# Patient Record
Sex: FEMALE | Race: WHITE | Employment: OTHER | URBAN - METROPOLITAN AREA
[De-identification: names, ages, dates, MRNs, and addresses within clinical notes are randomized per-mention and may not be internally consistent; named-entity substitution may affect disease eponyms.]

---

## 2017-05-02 ENCOUNTER — HOSPITAL ENCOUNTER (OUTPATIENT)
Dept: LAB | Age: 77
Discharge: HOME | End: 2017-05-02
Payer: MEDICARE

## 2017-05-02 DIAGNOSIS — Z01.818 PRE-OP TESTING: ICD-10-CM

## 2017-05-02 LAB
ALBUMIN SERPL BCP-MCNC: 4.1 G/DL (ref 3.4–5)
ALBUMIN/GLOB SERPL: 1.4 {RATIO} (ref 0.8–1.7)
ALP SERPL-CCNC: 87 U/L (ref 45–117)
ALT SERPL-CCNC: 29 U/L (ref 13–56)
ANION GAP BLD CALC-SCNC: 8 MMOL/L (ref 3–18)
AST SERPL W P-5'-P-CCNC: 19 U/L (ref 15–37)
ATRIAL RATE: 61 BPM
BILIRUB SERPL-MCNC: 0.6 MG/DL (ref 0.2–1)
BUN SERPL-MCNC: 14 MG/DL (ref 7–18)
BUN/CREAT SERPL: 16 (ref 12–20)
CALCIUM SERPL-MCNC: 9.3 MG/DL (ref 8.5–10.1)
CALCULATED P AXIS, ECG09: 18 DEGREES
CALCULATED R AXIS, ECG10: -43 DEGREES
CALCULATED T AXIS, ECG11: 68 DEGREES
CHLORIDE SERPL-SCNC: 99 MMOL/L (ref 100–108)
CO2 SERPL-SCNC: 28 MMOL/L (ref 21–32)
CREAT SERPL-MCNC: 0.85 MG/DL (ref 0.6–1.3)
DIAGNOSIS, 93000: NORMAL
ERYTHROCYTE [DISTWIDTH] IN BLOOD BY AUTOMATED COUNT: 14.7 % (ref 11.6–14.5)
GLOBULIN SER CALC-MCNC: 3 G/DL (ref 2–4)
GLUCOSE SERPL-MCNC: 90 MG/DL (ref 74–99)
HCT VFR BLD AUTO: 43.9 % (ref 35–45)
HGB BLD-MCNC: 14.7 G/DL (ref 12–16)
MCH RBC QN AUTO: 30.2 PG (ref 24–34)
MCHC RBC AUTO-ENTMCNC: 33.5 G/DL (ref 31–37)
MCV RBC AUTO: 90.3 FL (ref 74–97)
P-R INTERVAL, ECG05: 96 MS
PLATELET # BLD AUTO: 224 K/UL (ref 135–420)
PMV BLD AUTO: 11 FL (ref 9.2–11.8)
POTASSIUM SERPL-SCNC: 4 MMOL/L (ref 3.5–5.5)
PROT SERPL-MCNC: 7.1 G/DL (ref 6.4–8.2)
Q-T INTERVAL, ECG07: 422 MS
QRS DURATION, ECG06: 76 MS
QTC CALCULATION (BEZET), ECG08: 424 MS
RBC # BLD AUTO: 4.86 M/UL (ref 4.2–5.3)
SODIUM SERPL-SCNC: 135 MMOL/L (ref 136–145)
VENTRICULAR RATE, ECG03: 61 BPM
WBC # BLD AUTO: 7.4 K/UL (ref 4.6–13.2)

## 2017-05-02 PROCEDURE — 93005 ELECTROCARDIOGRAM TRACING: CPT

## 2017-05-14 NOTE — PROGRESS NOTES
This is a Subsequent Medicare Annual Wellness Visit providing Personalized Prevention Plan Services     I have reviewed the patient's medical history in detail and updated the computerized patient record. History     Past Medical History:   Diagnosis Date    Allergic rhinitis     Colon adenoma     1/15 Dr Tigre Robledo; polyp 2006 Dr Barron Sprsammie COPD     on films    DJD (degenerative joint disease)     Dyslipidemia     FHx: heart disease     GERD (gastroesophageal reflux disease)     s/p dilation Dr. Luis Alberto Truong 2006; [facility] and esophagitis 1/15 Dr Tigre Robledo    Monoclonal gammopathy 2011 Dr. Immanuel Fam Osteoporosis Dr. Carol Nicholson, t score -0.6 spine, -2.7 hip     took forteo 2012 to 2014 Dr Herring    Recurrent BCC (basal cell carcinoma)     Dr. Garzon Lesdickson dependence syndrome     UTI (urinary tract infection)       Past Surgical History:   Procedure Laterality Date    BREAST SURGERY PROCEDURE UNLISTED  1992    negative right breast biopsy Dr. Ramos Distance      Dr. Luis Alberto Truong 2006 polyp; Dr Tigre Robledo 1/15 adenoma    HX HYSTERECTOMY  1970s    for precancerous lesions in the cervix    HX ORTHOPAEDIC      DEXA t score 0.32 spine, -2.28 hip FRAX 5.3 hip Dr Carol Nicholson (12/13); spine 0.68, hip -2.51 w FRAX 7.2 (12/15)    NEUROLOGICAL PROCEDURE UNLISTED      s/p c spine surgery [facility] 2010? Current Outpatient Prescriptions   Medication Sig Dispense Refill    ezetimibe (ZETIA) 10 mg tablet Take 1 Tab by mouth daily. 90 Tab 3    tiotropium (SPIRIVA WITH HANDIHALER) 18 mcg inhalation capsule Take 1 Cap by inhalation daily. 90 Cap 3    atorvastatin (LIPITOR) 80 mg tablet Take 1 Tab by mouth daily. 90 Tab 3    albuterol (PROVENTIL HFA, VENTOLIN HFA, PROAIR HFA) 90 mcg/actuation inhaler Take 1 Puff by inhalation every six (6) hours as needed for Wheezing. 1 Inhaler 5    fluticasone (FLONASE) 50 mcg/actuation nasal spray 2 Sprays by Both Nostrils route daily.  3 Bottle 3    raloxifene (EVISTA) 60 mg tablet Take  by mouth daily.  Cholecalciferol, Vitamin D3, (VITAMIN D3) 1,000 unit Cap Take  by mouth.  aspirin delayed-release 81 mg tablet Take  by mouth daily. Allergies   Allergen Reactions    Pcn [Penicillins] Rash     Family History   Problem Relation Age of Onset    Heart Attack Father     Kidney Disease Mother      ADPKD    Diabetes Sister     Cancer Sister      leukemia     Social History   Substance Use Topics    Smoking status: Current Some Day Smoker     Packs/day: 1.00    Smokeless tobacco: Never Used    Alcohol use No     Depression Risk Factor Screening:     PHQ over the last two weeks 12/1/2016   Little interest or pleasure in doing things Not at all   Feeling down, depressed or hopeless Not at all   Total Score PHQ 2 0     SCREENINGS  Colonoscopy last done 1/15 Dr Emerita Barcenas last done 11/15  DEXA last done 12/15 Dr Karma Magana last done >5 yrs ago     Immunization History   Administered Date(s) Administered    Influenza High Dose Vaccine PF 12/01/2016    Influenza Vaccine 09/12/2013, 10/22/2014    Influenza Vaccine Split 12/02/2011    Influenza Vaccine Whole 12/18/2008    Pneumococcal Conjugate (PCV-13) 05/19/2016    Pneumococcal Vaccine (Unspecified Type) 11/03/2006    TDAP Vaccine 05/25/2011    Zoster 01/01/2008     Alcohol Risk Factor Screening: On any occasion during the past 3 months, have you had more than 3 drinks containing alcohol? No    Do you average more than 7 drinks per week? No      Functional Ability and Level of Safety:     Hearing Loss   Moderate Dr oTnya Kennedy - no acute complaints and is followed by [facility]    Activities of Daily Living   Self-care. Requires assistance with: no ADLs    Fall Risk     Fall Risk Assessment, last 12 mths 12/1/2016   Able to walk? Yes   Fall in past 12 months?  No     Abuse Screen   Patient is not abused    Review of Systems   A comprehensive review of systems was negative except for that written in the HPI. Physical Examination     Visit Vitals    /83    Pulse 76    Temp 98.1 °F (36.7 °C) (Oral)    Ht 5' 1\" (1.549 m)    Wt 115 lb (52.2 kg)    SpO2 98%    BMI 21.73 kg/m2       Evaluation of Cognitive Function:  Mood/affect:  neutral  Appearance: age appropriate, well dressed and within normal Limits  Family member/caregiver input: na    Patient Care Team:  Redge Landau, MD as PCP - General (Internal Medicine)  Cade Weinberg RN as [address] [place] [address] (Internal Medicine)  Kevin Sepulveda MD (Rheumatology)    Advice/Referrals/Counseling   Education and counseling provided:  Are appropriate based on today's review and evaluation  End-of-Life planning (with patient's consent)  Pneumococcal Vaccine  Screening Mammography  Colorectal cancer screening tests  Cardiovascular screening blood test  Bone mass measurement (DEXA)  Diabetes screening test    Assessment/Plan       ICD-10-CM ICD-9-CM    1. Routine general medical examination at a health care facility Z00.00 V70.0    2. Screening for alcoholism Z13.89 V79.1    3. Advanced directives, counseling/discussion Z71.89 V65.49 ADVANCE CARE PLANNING FIRST 30 MINS   4. Screening for depression Z13.89 V79.0 DEPRESSION SCREEN ANNUAL   5. Screen for colon cancer Z12.11 V76.51    6. Screening for diabetes mellitus Z13.1 V77.1    7. Screening for ischemic heart disease Z13.6 V81.0    8. Encounter for screening mammogram for malignant neoplasm of breast Z12.31 V76.12    9. Postmenopausal Z78.0 V49.81    10. Chronic obstructive pulmonary disease, unspecified COPD type ([facility] J44.9 496    11. Tobacco dependence syndrome F17.200 305.1    12. Dyslipidemia E78.5 272.4    13. Preop exam for internal medicine Z01.818 V72.83      current treatment plan is effective, no change in therapy  lab results and schedule of future lab studies reviewed with patient. End of life discussion undertaken.   Will work on getting amd in place  Colonoscopy beyond age  [de-identified] to be scheduled later this year  DEXA to be scheduled later this year

## 2017-05-14 NOTE — PATIENT INSTRUCTIONS
Medicare Part B Preventive Services Limitations Recommendation Scheduled   Bone Mass Measurement  (age 72 & older, biennial) Requires diagnosis related to osteoporosis or estrogen deficiency. Biennial benefit unless patient has history of long-term glucocorticoid tx or baseline is needed because initial test was by other method     Cardiovascular Screening Blood Tests (every 5 years)  Total cholesterol, HDL, Triglycerides Order as a panel if possible     Colorectal Cancer Screening  -Fecal occult blood test (annual)  -Flexible sigmoidoscopy (5y)  -Screening colonoscopy (10y)  -Barium Enema      Counseling to Prevent Tobacco Use (up to 8 sessions per year)  - Counseling greater than 3 and up to 10 minutes  - Counseling greater than 10 minutes Patients must be asymptomatic of tobacco-related conditions to receive as preventive service     Diabetes Screening Tests (at least every 3 years, Medicare covers annually or at 6-month intervals for prediabetic patients)    Fasting blood sugar (FBS) or glucose tolerance test (GTT) Patient must be diagnosed with one of the following:  -Hypertension, Dyslipidemia, obesity, previous impaired FBS or GTT  Or any two of the following: overweight, FH of diabetes, age ? 72, history of gestational diabetes, birth of baby weighing more than 9 pounds     Diabetes Self-Management Training (DSMT) (no USPSTF recommendation) Requires referral by treating physician for patient with diabetes or renal disease. 10 hours of initial DSMT session of no less than 30 minutes each in a continuous 12-month period. 2 hours of follow-up DSMT in subsequent years.      Glaucoma Screening (no USPSTF recommendation) Diabetes mellitus, family history, , age 48 or over,  American, age 72 or over     Human Immunodeficiency Virus (HIV) Screening (annually for increased risk patients)  HIV-1 and HIV-2 by EIA, JUAN DAVID, rapid antibody test, or oral mucosa transudate Patient must be at increased risk for HIV infection per USPSTF guidelines or pregnant. Tests covered annually for patients at increased risk. Pregnant patients may receive up to 3 test during pregnancy. Medical Nutrition Therapy (MNT) (for diabetes or renal disease not recommended schedule) Requires referral by treating physician for patient with diabetes or renal disease. Can be provided in same year as diabetes self-management training (DSMT), and CMS recommends medical nutrition therapy take place after DSMT. Up to 3 hours for initial year and 2 hours in subsequent years. Prostate Cancer Screening (annually up to age 76)  - Digital rectal exam (JUAN LUIS)  - Prostate specific antigen (PSA) Annually (age 48 or over), JUAN LUIS not paid separately when covered E/M service is provided on same date     Seasonal Influenza Vaccination (annually)      Pneumococcal Vaccination (once after 72)      Hepatitis B Vaccinations (if medium/high risk) Medium/high risk factors:  End-stage renal disease,  Hemophiliacs who received Factor VIII or IX concentrates, Clients of institutions for the mentally retarded, Persons who live in the same house as a HepB virus carrier, Homosexual men, Illicit injectable drug abusers. Screening Mammography (biennial age 54-69)? Annually (age 36 or over)     Screening Pap Tests and Pelvic Examination (up to age 79 and after 79 if unknown history or abnormal study last 10 years) Every 25 months except high risk     Ultrasound Screening for Abdominal Aortic Aneurysm (AAA) (once) Patient must be referred through IPPE and not have had a screening for abdominal aortic aneurysm before under Medicare.   Limited to patients who meet one of the following criteria:  - Men who are 73-68 years old and have smoked more than 100 cigarettes in their lifetime.  -Anyone with a FH of AAA  -Anyone recommended for screening by USPSTF

## 2017-05-14 NOTE — PROGRESS NOTES
69 y.o. WHITE OR  female who presents for medical clearance    She will be undergoing hand surgery by Dr Tye Tam for CTS and thumb arthritis    Denies any cardiovascular complaints, no exercise but remains active doing chores, the lawn and watches the great grandkids. Using the spiriva daily and only using the rescue inhaler 2-3x/month. The allergies are controlled on flonase. Unfortunately, she started back the smoking, chantix worked in the past though    Denies any GI complaints on the PPI. No gu complaints    Past Medical History:   Diagnosis Date    Allergic rhinitis     Colon adenoma     1/15 Dr Dm Coello; polyp 2006 Dr Persaud Guardirisa COPD     on films    DJD (degenerative joint disease)     Dyslipidemia     FHx: heart disease     GERD (gastroesophageal reflux disease)     s/p dilation Dr. Dakotah Magdaleno 2006; [place] and esophagitis 1/15 Dr Dm Coello    Monoclonal gammopathy 2011 Dr. Peña Letters Osteoporosis Dr. Aissatou Benitez, t score -0.6 spine, -2.7 hip     took forteo 2012 to 2014 Dr Herring    Recurrent BCC (basal cell carcinoma)     Dr. Kali Maria dependence syndrome     UTI (urinary tract infection)      Past Surgical History:   Procedure Laterality Date    BREAST SURGERY PROCEDURE UNLISTED  1992    negative right breast biopsy Dr. Dominique Magdaleno 2006 polyp; Dr Dm Coello 1/15 adenoma    HX HYSTERECTOMY  1970s    for precancerous lesions in the cervix    HX ORTHOPAEDIC      DEXA t score 0.32 spine, -2.28 hip FRAX 5.3 hip Dr Aissatou Benitez (12/13); spine 0.68, hip -2.51 w FRAX 7.2 (12/15)    NEUROLOGICAL PROCEDURE UNLISTED      s/p c spine surgery [facility] 2010?      Social History     Social History    Marital status:      Spouse name: N/A    Number of children: 3    Years of education: N/A     Occupational History    retired [employer]      Social History Main Topics    Smoking status: Current Some Day Smoker     Packs/day: 1.00    Smokeless tobacco: Never Used    Alcohol use No    Drug use: No    Sexual activity: Not on file     Other Topics Concern    Not on file     Social History Narrative     Allergies   Allergen Reactions    Pcn [Penicillins] Rash       Current Outpatient Prescriptions   Medication Sig    ezetimibe (ZETIA) 10 mg tablet Take 1 Tab by mouth daily.  tiotropium (SPIRIVA WITH HANDIHALER) 18 mcg inhalation capsule Take 1 Cap by inhalation daily.  atorvastatin (LIPITOR) 80 mg tablet Take 1 Tab by mouth daily.  albuterol (PROVENTIL HFA, VENTOLIN HFA, PROAIR HFA) 90 mcg/actuation inhaler Take 1 Puff by inhalation every six (6) hours as needed for Wheezing.  fluticasone (FLONASE) 50 mcg/actuation nasal spray 2 Sprays by Both Nostrils route daily.  raloxifene (EVISTA) 60 mg tablet Take  by mouth daily.  Cholecalciferol, Vitamin D3, (VITAMIN D3) 1,000 unit Cap Take  by mouth.  aspirin delayed-release 81 mg tablet Take  by mouth daily. No current facility-administered medications for this visit.       LAST MEDICARE WELLNESS EXAM: 4/30/14, 5/1/15, 5/19/16, 5/16/17  ACP 5/19/16, 5/16/17    REVIEW OF SYSTEMS:  gyn 2006, mammo 11/15, DEXA 12/15 Dr Eri Faye, colo 1/15 Dr Bob Restrepo, sees Dr Sabas Collins and Dr Kelsea Merrill  Ophtho - no vision change or eye pain  Oral - no mouth pain, tongue or tooth problems  Ears - no hearing loss, ear pain, fullness, no swallowing problems  Cardiac - no CP, PND, orthopnea, edema, palpitations or syncope  Chest - no breast masses  Resp - no wheezing, chronic coughing, dyspnea  GI - no heartburn, nausea, vomiting, change in bowel habits, bleeding, hemorrhoids  Constitutional - no wt loss, night sweats, unexplained fevers  Neuro - no focal weakness, numbness, paresthesias, incoordination, ataxia, involuntary movements  Endo - no polyuria, polydipsia, nocturia, hot flashes    Visit Vitals    /83    Pulse 76    Temp 98.1 °F (36.7 °C) (Oral)    Ht 5' 1\" (1.549 m)    Wt 115 lb (52.2 kg)    SpO2 98%    BMI 21.73 kg/m2     A&O x3  Affect is appropriate. Mood stable  No apparent distress  Anicteric, no JVD, adenopathy or thyromegaly. No carotid bruits or radiated murmur  Lungs clear to auscultation, no wheezes or rales  Heart showed regular rate and rhythm. No murmur, rubs, gallops  Abdomen soft nontender, no hepatosplenomegaly or masses. Extremities without edema.   Pulses 1-2+ symmetrically    LABS  From 5/11 showed   gluc 90,   cr 0.70,    alt 21,          chol 149, tg 100, hdl 29, ldl-c 100, wbc 9.8, hb 14.2, plt 276  From 10/11 showed gluc 100, cr 1.04, gfr 55  From 11/11 showed                   ldl-p 1685, chol 157, tg 116, hdl 41, ldl-c 93,   spep+  From 3/12 showed   gluc 91,   cr 0.84, gfr 70,  alt 17, ldl-p 1309, chol 126, tg 86,   hdl 37, ldl-c 72  From 8/12 showed   gluc 95,   cr 0.86, gfr 68,  alt 21  From 3/13 showed                   ldl-p 1281, chol 118, tg 93,   hdl 40, ldl-c 59  From 9/13 showed   gluc 92,   cr 0.82, gfr 72,  alt 19, ldl-p 1268, chol 136, tg 71,   hdl 42, ldl-c 80,   wbc 7.7, hb 13.4, plt 310, ua neg pro  From 3/14 showed   gluc 92,   cr 0.88, gfr>60, alt<5,          chol 122, tg 104, hdl 35, ldl-c 66,   wbc 7.5, hb 12.7, plt 312, ua neg  From 10/14 showed               chol 140, tg 100, hdl 36, ldl-c 84  From 4/15 showed   gluc 94,   cr 0.96, gfr>60, alt 6,          wbc 9.3, hb 14.0, plt 313  From 11/15 showed               chol 144, tg 79,   hdl 44, ldl-c 84  From 5/16 showed   gluc 82,   cr 0.82, gfr>60, alt 26,          wbc 5.3, hb 14.6, plt 241  From 11/16 showed               chol 217, tg 165, hdl 47, ldl-c 137  From 5/17 showed   gluc 90,   cr 0.75, gfr>60, alt 29,          wbc 7.4, hb 14.7, plt 224    EKG showed nsr, short pr, lad, no acute stt changes    Patient Active Problem List   Diagnosis Code    Colon polyps Dr. Brando Bains 2006 K63.5    Osteoporosis Dr. Noemi Castro M81.0    Monoclonal gammopathy 2011 Dr. Baldev Ho D47.2    COPD on films J44.9    Dyslipidemia E78.5    Arthritis, degenerative M19.90    Gastroesophageal reflux disease without esophagitis K21.9    Advance directive discussed with patient Z70.80    Tobacco dependence syndrome F17.200     ASSESSMENT AND PLAN:  1. Dyslipidemia. Continue current regimen  2. GERD. Daily PPI and avoidance measures  3. DJD. Continue prn otc meds  4. Colon polyps. F/U Dr. Yaima Delvalle 2016, fiber  5. COPD. Continue current. Smoking cessation reiterated  6. Osteoporosis. F/U Dr. Bin Hamilton as sched  7. MGUS. F/U Dr. Alvarado Giles  8. Allergies. F/U Dr. Lera Cranker, flonase cont  9. Hand/ortho. She is felt to be average risk for the planned procedure, no contraindications noted. RTC 11/16    Above conditions discussed at length and patient vocalized understanding.   All questions answered to patient satifaction

## 2017-05-16 ENCOUNTER — OFFICE VISIT (OUTPATIENT)
Dept: INTERNAL MEDICINE CLINIC | Age: 77
End: 2017-05-16

## 2017-05-16 VITALS
DIASTOLIC BLOOD PRESSURE: 83 MMHG | WEIGHT: 115 LBS | HEART RATE: 76 BPM | OXYGEN SATURATION: 98 % | BODY MASS INDEX: 21.71 KG/M2 | SYSTOLIC BLOOD PRESSURE: 134 MMHG | TEMPERATURE: 98.1 F | HEIGHT: 61 IN

## 2017-05-16 DIAGNOSIS — Z71.89 ADVANCED DIRECTIVES, COUNSELING/DISCUSSION: ICD-10-CM

## 2017-05-16 DIAGNOSIS — Z12.31 ENCOUNTER FOR SCREENING MAMMOGRAM FOR MALIGNANT NEOPLASM OF BREAST: ICD-10-CM

## 2017-05-16 DIAGNOSIS — Z13.1 SCREENING FOR DIABETES MELLITUS: ICD-10-CM

## 2017-05-16 DIAGNOSIS — Z78.0 POSTMENOPAUSAL: ICD-10-CM

## 2017-05-16 DIAGNOSIS — Z12.11 SCREEN FOR COLON CANCER: ICD-10-CM

## 2017-05-16 DIAGNOSIS — Z13.6 SCREENING FOR ISCHEMIC HEART DISEASE: ICD-10-CM

## 2017-05-16 DIAGNOSIS — J44.9 CHRONIC OBSTRUCTIVE PULMONARY DISEASE, UNSPECIFIED COPD TYPE (HCC): ICD-10-CM

## 2017-05-16 DIAGNOSIS — Z00.00 ROUTINE GENERAL MEDICAL EXAMINATION AT A HEALTH CARE FACILITY: Primary | ICD-10-CM

## 2017-05-16 DIAGNOSIS — Z13.39 SCREENING FOR ALCOHOLISM: ICD-10-CM

## 2017-05-16 DIAGNOSIS — F17.200 TOBACCO DEPENDENCE SYNDROME: ICD-10-CM

## 2017-05-16 DIAGNOSIS — Z13.31 SCREENING FOR DEPRESSION: ICD-10-CM

## 2017-05-16 DIAGNOSIS — Z01.818 PREOP EXAM FOR INTERNAL MEDICINE: ICD-10-CM

## 2017-05-16 DIAGNOSIS — E78.5 DYSLIPIDEMIA: ICD-10-CM

## 2017-05-16 NOTE — PROGRESS NOTES
1. Have you been to the ER, urgent care clinic or hospitalized since your last visit? NONO. 2. Have you seen or consulted any other health care providers outside of the [facility] since your last visit (Include any pap smears or colon screening)? NO      Do you have an Advanced Directive? NO    Would you like information on Advanced Directives?  YES

## 2017-05-16 NOTE — MR AVS SNAPSHOT
Visit Information Date & Time Provider Department Dept. Phone Encounter #  
 5/16/2017  9:30 AM Brina Ku MD Internist of [address] [place] [address] [phone] [id number] Your Appointments 6/6/2017  8:35 AM  
LAB with C NURSE VISIT Internist of [facility] ([address]) Appt Note: labs 6mos. rd  
 [address] [facility], [address] [facility] [address]  
  
   
 [address] [facility]  
  
    
 6/13/2017  9:30 AM  
Office Visit with Brina Ku MD  
Internist of [address] [place] [address] [address] [facility]) Appt Note: ov 6mos,. rd  
 [address] [facility], [id number] [place] [address] [address]  
  
   
 [address] [facility] Upcoming Health Maintenance Date Due INFLUENZA AGE 9 TO ADULT 8/1/2017 MEDICARE YEARLY EXAM 5/17/2018 GLAUCOMA SCREENING Q2Y 11/28/2018 COLONOSCOPY 1/14/2020 DTaP/Tdap/Td series (2 - Td) 5/25/2021 Allergies as of 5/16/2017  Review Complete On: 12/1/2016 By: Brina Ku MD  
  
 Severity Noted Reaction Type Reactions Pcn [Penicillins]  10/12/2011    Rash Current Immunizations  Reviewed on 5/19/2016 Name Date Influenza High Dose Vaccine PF 12/1/2016 Influenza Vaccine 10/22/2014, 9/12/2013 Influenza Vaccine Split 12/2/2011 Influenza Vaccine Whole 12/18/2008 Pneumococcal Conjugate (PCV-13) 5/19/2016 10:46 AM  
 Pneumococcal Vaccine (Unspecified Type) 11/3/2006 TDAP Vaccine 5/25/2011 Zoster 1/1/2008 Not reviewed this visit You Were Diagnosed With   
  
 Codes Comments Routine general medical examination at a health care facility    -  Primary ICD-10-CM: Z00.00 ICD-9-CM: V70.0 Screening for alcoholism     ICD-10-CM: Z13.89 ICD-9-CM: V79.1 Advanced directives, counseling/discussion     ICD-10-CM: Z71.89 ICD-9-CM: V65.49 Screening for depression     ICD-10-CM: Z13.89 ICD-9-CM: V79.0 Screen for colon cancer     ICD-10-CM: Z12.11 ICD-9-CM: V76.51 Screening for diabetes mellitus     ICD-10-CM: Z13.1 ICD-9-CM: V77.1 Screening for ischemic heart disease     ICD-10-CM: Z13.6 ICD-9-CM: V81.0 Encounter for screening mammogram for malignant neoplasm of breast     ICD-10-CM: Z12.31 
ICD-9-CM: V76.12 Postmenopausal     ICD-10-CM: Z78.0 ICD-9-CM: V49.81 Vitals BP Pulse Temp Height(growth percentile) Weight(growth percentile) SpO2  
 144/80 76 98.1 °F (36.7 °C) (Oral) 5' 1\" (1.549 m) 115 lb (52.2 kg) 98% BMI OB Status Smoking Status 21.73 kg/m2 Hysterectomy Current Some Day Smoker Vitals History BMI and BSA Data Body Mass Index Body Surface Area 21.73 kg/m 2 1.5 m 2 Your Updated Medication List  
  
   
This list is accurate as of: 5/16/17 10:18 AM.  Always use your most recent med list.  
  
  
  
  
 albuterol 90 mcg/actuation inhaler Commonly known as:  PROVENTIL HFA, VENTOLIN HFA, PROAIR HFA Take 1 Puff by inhalation every six (6) hours as needed for Wheezing. aspirin delayed-release 81 mg tablet Take  by mouth daily. atorvastatin 80 mg tablet Commonly known as:  LIPITOR Take 1 Tab by mouth daily. EVISTA 60 mg tablet Generic drug:  raloxifene Take  by mouth daily. ezetimibe 10 mg tablet Commonly known as:  Manohar Grapes Take 1 Tab by mouth daily. fluticasone 50 mcg/actuation nasal spray Commonly known as:  Posada Philipp 2 Sprays by Both Nostrils route daily. tiotropium 18 mcg inhalation capsule Commonly known as:  [address] Take 1 Cap by inhalation daily. VITAMIN D3 1,000 unit Cap Generic drug:  cholecalciferol Take  by mouth. We Performed the Following ADVANCE CARE PLANNING FIRST 30 MINS [57816 CPT(R)] Gray 69 [[id number] \[facility]\""] Patient Instructions Medicare Part B Preventive Services Limitations Recommendation Scheduled Bone Mass Measurement 
(age 72 & older, biennial) Requires diagnosis related to osteoporosis or estrogen deficiency. Biennial benefit unless patient has history of long-term glucocorticoid tx or baseline is needed because initial test was by other method Cardiovascular Screening Blood Tests (every 5 years) Total cholesterol, HDL, Triglycerides Order as a panel if possible Colorectal Cancer Screening 
-Fecal occult blood test (annual) -Flexible sigmoidoscopy (5y) 
-Screening colonoscopy (10y) -Barium Enema Counseling to Prevent Tobacco Use (up to 8 sessions per year) - Counseling greater than 3 and up to 10 minutes - Counseling greater than 10 minutes Patients must be asymptomatic of tobacco-related conditions to receive as preventive service Diabetes Screening Tests (at least every 3 years, Medicare covers annually or at 6-month intervals for prediabetic patients) Fasting blood sugar (FBS) or glucose tolerance test (GTT) Patient must be diagnosed with one of the following: 
-Hypertension, Dyslipidemia, obesity, previous impaired FBS or GTT 
Or any two of the following: overweight, FH of diabetes, age ? 72, history of gestational diabetes, birth of baby weighing more than 9 pounds Diabetes Self-Management Training (DSMT) (no USPSTF recommendation) Requires referral by treating physician for patient with diabetes or renal disease. 10 hours of initial DSMT session of no less than 30 minutes each in a continuous 12-month period. 2 hours of follow-up DSMT in subsequent years. Glaucoma Screening (no USPSTF recommendation) Diabetes mellitus, family history, , age 48 or over,  American, age 72 or over Human Immunodeficiency Virus (HIV) Screening (annually for increased risk patients) HIV-1 and HIV-2 by EIA, JUAN DAVID, rapid antibody test, or oral mucosa transudate Patient must be at increased risk for HIV infection per USPSTF guidelines or pregnant. Tests covered annually for patients at increased risk. Pregnant patients may receive up to 3 test during pregnancy. Medical Nutrition Therapy (MNT) (for diabetes or renal disease not recommended schedule) Requires referral by treating physician for patient with diabetes or renal disease. Can be provided in same year as diabetes self-management training (DSMT), and CMS recommends medical nutrition therapy take place after DSMT. Up to 3 hours for initial year and 2 hours in subsequent years. Prostate Cancer Screening (annually up to age 76) - Digital rectal exam (JUAN LUIS) - Prostate specific antigen (PSA) Annually (age 48 or over), JUAN LUIS not paid separately when covered E/M service is provided on same date Seasonal Influenza Vaccination (annually) Pneumococcal Vaccination (once after 65) Hepatitis B Vaccinations (if medium/high risk) Medium/high risk factors:  End-stage renal disease, Hemophiliacs who received Factor VIII or IX concentrates, Clients of institutions for the mentally retarded, Persons who live in the same house as a HepB virus carrier, Homosexual men, Illicit injectable drug abusers. Screening Mammography (biennial age 54-69)? Annually (age 36 or over) Screening Pap Tests and Pelvic Examination (up to age 79 and after 79 if unknown history or abnormal study last 10 years) Every 24 months except high risk Ultrasound Screening for Abdominal Aortic Aneurysm (AAA) (once) Patient must be referred through IPPE and not have had a screening for abdominal aortic aneurysm before under Medicare. Limited to patients who meet one of the following criteria: 
- Men who are 73-68 years old and have smoked more than 100 cigarettes in their lifetime. 
-Anyone with a FH of AAA 
-Anyone recommended for screening by USPSTF Introducing [facility] & HEALTH SERVICES! Beatrice Chaves introduces BitLe[employer] patient portal. Now you can access parts of your medical record, email your doctor's office, and request medication refills online. 1. In your internet browser, go to [email or web address] 2. Click on the First Time User? Click Here link in the Sign In box. You will see the New Member Sign Up page. 3. Enter your BitLeap Access Code exactly as it appears below. You will not need to use this code after youve completed the sign-up process. If you do not sign up before the expiration date, you must request a new code. · BitLeap Access Code: [email or web address] Expires: 8/14/2017 10:18 AM 
 
4. Enter the last four digits of your Social Security Number (xxxx) and Date of Birth (mm/dd/yyyy) as indicated and click Submit. You will be taken to the next sign-up page. 5. Create a BitLeap ID. This will be your BitLeap login ID and cannot be changed, so think of one that is secure and easy to remember. 6. Create a BitLeap password. You can change your password at any time. 7. Enter your Password Reset Question and Answer. This can be used at a later time if you forget your password. 8. Enter your e-mail address. You will receive e-mail notification when new information is available in [address] 9. Click Sign Up. You can now view and download portions of your medical record. 10. Click the Download Summary menu link to download a portable copy of your medical information. If you have questions, please visit the Frequently Asked Questions section of the BitLeap website. Remember, BitLeap is NOT to be used for urgent needs. For medical emergencies, dial 911. Now available from your iPhone and Android! Please provide this summary of care documentation to your next provider. Your primary care clinician is listed as Katheryn Kaba. If you have any questions after today's visit, please call [phone].

## 2017-06-06 ENCOUNTER — HOSPITAL ENCOUNTER (OUTPATIENT)
Dept: LAB | Age: 77
Discharge: HOME | End: 2017-06-06
Payer: MEDICARE

## 2017-06-06 DIAGNOSIS — E78.5 DYSLIPIDEMIA: ICD-10-CM

## 2017-06-06 LAB
ANION GAP BLD CALC-SCNC: 7 MMOL/L (ref 3–18)
BUN SERPL-MCNC: 15 MG/DL (ref 7–18)
BUN/CREAT SERPL: 17 (ref 12–20)
CALCIUM SERPL-MCNC: 9.5 MG/DL (ref 8.5–10.1)
CHLORIDE SERPL-SCNC: 103 MMOL/L (ref 100–108)
CO2 SERPL-SCNC: 28 MMOL/L (ref 21–32)
CREAT SERPL-MCNC: 0.86 MG/DL (ref 0.6–1.3)
ERYTHROCYTE [DISTWIDTH] IN BLOOD BY AUTOMATED COUNT: 15.4 % (ref 11.6–14.5)
GLUCOSE SERPL-MCNC: 86 MG/DL (ref 74–99)
HCT VFR BLD AUTO: 47 % (ref 35–45)
HGB BLD-MCNC: 15.4 G/DL (ref 12–16)
MCH RBC QN AUTO: 30.8 PG (ref 24–34)
MCHC RBC AUTO-ENTMCNC: 32.8 G/DL (ref 31–37)
MCV RBC AUTO: 94 FL (ref 74–97)
PLATELET # BLD AUTO: 235 K/UL (ref 135–420)
PMV BLD AUTO: 11 FL (ref 9.2–11.8)
POTASSIUM SERPL-SCNC: 4 MMOL/L (ref 3.5–5.5)
RBC # BLD AUTO: 5 M/UL (ref 4.2–5.3)
SODIUM SERPL-SCNC: 138 MMOL/L (ref 136–145)
WBC # BLD AUTO: 8.4 K/UL (ref 4.6–13.2)

## 2017-06-06 PROCEDURE — 80048 BASIC METABOLIC PNL TOTAL CA: CPT | Performed by: INTERNAL MEDICINE

## 2017-06-06 PROCEDURE — 85027 COMPLETE CBC AUTOMATED: CPT | Performed by: INTERNAL MEDICINE

## 2017-06-06 PROCEDURE — 36415 COLL VENOUS BLD VENIPUNCTURE: CPT | Performed by: INTERNAL MEDICINE

## 2017-06-11 NOTE — PROGRESS NOTES
69 y.o. [place]  female who presents for f/u    She successfully underwent right CTS release and thumb surgery, she's noting improvement in the sx already    Denies any cardiovascular complaints, no exercise but remains active doing chores, the lawn and watches the great grandkids. She has taken a couple trips to [place] and [place] this past year already    Using the spiriva daily and only using the rescue inhaler 2-3x/month. The allergies are controlled on flonase. Still trying to quit smoking    Denies any GI complaints on the PPI. No gu complaints    Past Medical History:   Diagnosis Date    Allergic rhinitis     Colon adenoma     1/15 Dr Ana Horton; polyp 2006 Dr Ruiz Finely COPD     on films    DJD (degenerative joint disease)     Dyslipidemia     FHx: heart disease     GERD (gastroesophageal reflux disease)     s/p dilation Dr. Grant Every 2006; [facility] and esophagitis 1/15 Dr Ana Horton    Monoclonal gammopathy 2011 Dr. Viola Reeves Osteoporosis Dr. Rivas Hanson, t score -0.6 spine, -2.7 hip     took forteo 2012 to 2014 Dr Herring    Recurrent BCC (basal cell carcinoma)     Dr. Chey Stephens dependence syndrome     UTI (urinary tract infection)      Past Surgical History:   Procedure Laterality Date    BREAST SURGERY PROCEDURE UNLISTED  1992    negative right breast biopsy Dr. Delmis Grant Every 2006 polyp; Dr Ana Horton 1/15 adenoma    HX HYSTERECTOMY  1970s    for precancerous lesions in the cervix    HX ORTHOPAEDIC      DEXA t score 0.32 spine, -2.28 hip FRAX 5.3 hip Dr Rivas Hanson (12/13); spine 0.68, hip -2.51 w FRAX 7.2 (12/15)    HX ORTHOPAEDIC  05/2017    right CTS release and thumb surgery Dr Nnamdi Eller      s/p c spine surgery [facility] 2010?      Social History     Social History    Marital status:      Spouse name: N/A    Number of children: 3    Years of education: N/A     Occupational History    retired [employer] History Main Topics    Smoking status: Current Some Day Smoker     Packs/day: 1.00    Smokeless tobacco: Never Used    Alcohol use No    Drug use: No    Sexual activity: Not on file     Other Topics Concern    Not on file     Social History Narrative     Allergies   Allergen Reactions    Pcn [Penicillins] Rash       Current Outpatient Prescriptions   Medication Sig    ezetimibe (ZETIA) 10 mg tablet Take 1 Tab by mouth daily.  tiotropium (SPIRIVA WITH HANDIHALER) 18 mcg inhalation capsule Take 1 Cap by inhalation daily.  atorvastatin (LIPITOR) 80 mg tablet Take 1 Tab by mouth daily.  albuterol (PROVENTIL HFA, VENTOLIN HFA, PROAIR HFA) 90 mcg/actuation inhaler Take 1 Puff by inhalation every six (6) hours as needed for Wheezing.  fluticasone (FLONASE) 50 mcg/actuation nasal spray 2 Sprays by Both Nostrils route daily.  raloxifene (EVISTA) 60 mg tablet Take  by mouth daily.  Cholecalciferol, Vitamin D3, (VITAMIN D3) 1,000 unit Cap Take  by mouth.  aspirin delayed-release 81 mg tablet Take  by mouth daily. No current facility-administered medications for this visit.       LAST MEDICARE WELLNESS EXAM: 4/30/14, 5/1/15, 5/19/16, 5/16/17  ACP 5/19/16, 5/16/17    REVIEW OF SYSTEMS:  gyn 2006, mammo 11/15, DEXA 12/15 Dr Simone Villegas, colo 1/15 Dr Leanne Richards, sees Dr Niko Nguyen and Dr Homar Carvalho  Ophtho - no vision change or eye pain  Oral - no mouth pain, tongue or tooth problems  Ears - no hearing loss, ear pain, fullness, no swallowing problems  Cardiac - no CP, PND, orthopnea, edema, palpitations or syncope  Chest - no breast masses  Resp - no wheezing, chronic coughing, dyspnea  GI - no heartburn, nausea, vomiting, change in bowel habits, bleeding, hemorrhoids  Constitutional - no wt loss, night sweats, unexplained fevers  Neuro - no focal weakness, numbness, paresthesias, incoordination, ataxia, involuntary movements  Endo - no polyuria, polydipsia, nocturia, hot flashes    Visit Vitals    BP 146/80    Pulse 68    Temp 97.7 °F (36.5 °C) (Oral)    Ht 5' 1\" (1.549 m)    Wt 117 lb (53.1 kg)    SpO2 99%    BMI 22.11 kg/m2     A&O x3  Affect is appropriate. Mood stable  No apparent distress  Anicteric, no JVD, adenopathy or thyromegaly. No carotid bruits or radiated murmur  Lungs clear to auscultation, no wheezes or rales  Heart showed regular rate and rhythm. No murmur, rubs, gallops  Abdomen soft nontender, no hepatosplenomegaly or masses. Extremities without edema. Pulses 1-2+ symmetrically.   Healing wounds right palm and thumb    LABS  From 5/11 showed   gluc 90,   cr 0.70,    alt 21,          chol 149, tg 100, hdl 29, ldl-c 100, wbc 9.8, hb 14.2, plt 276  From 10/11 showed gluc 100, cr 1.04, gfr 55  From 11/11 showed                   ldl-p 1685, chol 157, tg 116, hdl 41, ldl-c 93,   spep+  From 3/12 showed   gluc 91,   cr 0.84, gfr 70,  alt 17, ldl-p 1309, chol 126, tg 86,   hdl 37, ldl-c 72  From 8/12 showed   gluc 95,   cr 0.86, gfr 68,  alt 21  From 3/13 showed                   ldl-p 1281, chol 118, tg 93,   hdl 40, ldl-c 59  From 9/13 showed   gluc 92,   cr 0.82, gfr 72,  alt 19, ldl-p 1268, chol 136, tg 71,   hdl 42, ldl-c 80,   wbc 7.7, hb 13.4, plt 310, ua neg pro  From 3/14 showed   gluc 92,   cr 0.88, gfr>60, alt<5,          chol 122, tg 104, hdl 35, ldl-c 66,   wbc 7.5, hb 12.7, plt 312, ua neg  From 10/14 showed               chol 140, tg 100, hdl 36, ldl-c 84  From 4/15 showed   gluc 94,   cr 0.96, gfr>60, alt 6,          wbc 9.3, hb 14.0, plt 313  From 11/15 showed               chol 144, tg 79,   hdl 44, ldl-c 84  From 5/16 showed   gluc 82,   cr 0.82, gfr>60, alt 26,          wbc 5.3, hb 14.6, plt 241  From 11/16 showed               chol 217, tg 165, hdl 47, ldl-c 137  From 5/17 showed   gluc 90,   cr 0.75, gfr>60, alt 29,          wbc 7.4, hb 14.7, plt 224    Results for orders placed or performed during the hospital encounter of 25/06/96   METABOLIC PANEL, BASIC   Result Value Ref Range    Sodium 138 136 - 145 mmol/L    Potassium 4.0 3.5 - 5.5 mmol/L    Chloride 103 100 - 108 mmol/L    CO2 28 21 - 32 mmol/L    Anion gap 7 3.0 - 18 mmol/L    Glucose 86 74 - 99 mg/dL    BUN 15 7.0 - 18 MG/DL    Creatinine 0.86 0.6 - 1.3 MG/DL    BUN/Creatinine ratio 17 12 - 20      GFR est AA >60 >60 ml/min/1.73m2    GFR est non-AA >60 >60 ml/min/1.73m2    Calcium 9.5 8.5 - 10.1 MG/DL   CBC W/O DIFF   Result Value Ref Range    WBC 8.4 4.6 - 13.2 K/uL    RBC 5.00 4.20 - 5.30 M/uL    HGB 15.4 12.0 - 16.0 g/dL    HCT 47.0 (H) 35.0 - 45.0 %    MCV 94.0 74.0 - 97.0 FL    MCH 30.8 24.0 - 34.0 PG    MCHC 32.8 31.0 - 37.0 g/dL    RDW 15.4 (H) 11.6 - 14.5 %    PLATELET 356 451 - 610 K/uL    MPV 11.0 9.2 - 11.8 FL     Patient Active Problem List   Diagnosis Code    Colon polyps Dr. Chris Garcia 2006 K63.5    Osteoporosis Dr. Sarai Vazquez M81.0    Monoclonal gammopathy 2011 Dr. Stefanie Newman D47.2    COPD on films J44.9    Dyslipidemia E78.5    Arthritis, degenerative M19.90    Gastroesophageal reflux disease without esophagitis K21.9    Advance directive discussed with patient Z71.89    Tobacco dependence syndrome F17.200     ASSESSMENT AND PLAN:  1. Dyslipidemia. Continue current regimen  2. GERD. Daily PPI and avoidance measures  3. DJD. Continue prn otc meds  4. Colon polyps. F/U Dr. Chris Garcia 2016, fiber  5. COPD. Continue current. Smoking cessation attempts  6. Osteoporosis. F/U Dr. Sarai Vazquez as sched  7. MGUS. F/U Dr. Ken Dupont  8. Allergies. F/U katty Sanford cont          RTC 12/17    Above conditions discussed at length and patient vocalized understanding.   All questions answered to patient satifaction

## 2017-06-13 ENCOUNTER — OFFICE VISIT (OUTPATIENT)
Dept: INTERNAL MEDICINE CLINIC | Age: 77
End: 2017-06-13

## 2017-06-13 VITALS
HEIGHT: 61 IN | WEIGHT: 117 LBS | HEART RATE: 68 BPM | DIASTOLIC BLOOD PRESSURE: 80 MMHG | OXYGEN SATURATION: 99 % | TEMPERATURE: 97.7 F | BODY MASS INDEX: 22.09 KG/M2 | SYSTOLIC BLOOD PRESSURE: 146 MMHG

## 2017-06-13 DIAGNOSIS — E78.5 DYSLIPIDEMIA: Primary | ICD-10-CM

## 2017-06-13 DIAGNOSIS — K21.9 GASTROESOPHAGEAL REFLUX DISEASE WITHOUT ESOPHAGITIS: ICD-10-CM

## 2017-06-13 DIAGNOSIS — D47.2 MONOCLONAL GAMMOPATHY: ICD-10-CM

## 2017-06-13 DIAGNOSIS — F17.200 TOBACCO DEPENDENCE SYNDROME: ICD-10-CM

## 2017-06-13 DIAGNOSIS — J44.9 CHRONIC OBSTRUCTIVE PULMONARY DISEASE, UNSPECIFIED COPD TYPE (HCC): ICD-10-CM

## 2017-06-13 NOTE — MR AVS SNAPSHOT
Visit Information Date & Time Provider Department Dept. Phone Encounter #  
 6/13/2017  9:30 AM Cori Wiggins MD Internist of [facility] [phone] [id number] Follow-up Instructions Return in about 6 months (around 12/13/2017). Follow-up and Disposition History Your Appointments 12/13/2017  8:45 AM  
LAB with [facility] NURSE VISIT Internist of [facility] ([facility]) Appt Note: lab  
 [address], [address] [employer] [place] [address] [place]  
  
   
 [address], [place]  
  
    
 12/20/2017  2:00 PM  
PHYSICAL with Cori Wiggins MD  
Internist of [facility]) Appt Note:  6 months [address], [address] [facility] [address] [place]  
  
   
 [address], [facility] Upcoming Health Maintenance Date Due INFLUENZA AGE 9 TO ADULT 8/1/2017 MEDICARE YEARLY EXAM 5/17/2018 GLAUCOMA SCREENING Q2Y 11/28/2018 COLONOSCOPY 1/14/2020 DTaP/Tdap/Td series (2 - Td) 5/25/2021 Allergies as of 6/13/2017  Review Complete On: 6/13/2017 By: Cori Wiggins MD  
  
 Severity Noted Reaction Type Reactions Pcn [Penicillins]  10/12/2011    Rash Current Immunizations  Reviewed on 5/19/2016 Name Date Influenza High Dose Vaccine PF 12/1/2016 Influenza Vaccine 10/22/2014, 9/12/2013 Influenza Vaccine Split 12/2/2011 Influenza Vaccine Whole 12/18/2008 Pneumococcal Conjugate (PCV-13) 5/19/2016 10:46 AM  
 Pneumococcal Vaccine (Unspecified Type) 11/3/2006 TDAP Vaccine 5/25/2011 Zoster 1/1/2008 Not reviewed this visit You Were Diagnosed With   
  
 Codes Comments Dyslipidemia    -  Primary ICD-10-CM: E78.5 ICD-9-CM: 272.4 Chronic obstructive pulmonary disease, unspecified COPD type ([facility]     ICD-10-CM: J44.9 ICD-9-CM: 304 Monoclonal gammopathy     ICD-10-CM: D47.2 ICD-9-CM: 273.1 Gastroesophageal reflux disease without esophagitis     ICD-10-CM: K21.9 ICD-9-CM: 530.81 Tobacco dependence syndrome     ICD-10-CM: F17.200 ICD-9-CM: 305.1 Vitals BP Pulse Temp Height(growth percentile) Weight(growth percentile) SpO2  
 146/80 68 97.7 °F (36.5 °C) (Oral) 5' 1\" (1.549 m) 117 lb (53.1 kg) 99% BMI OB Status Smoking Status 22.11 kg/m2 Hysterectomy Current Some Day Smoker Vitals History BMI and BSA Data Body Mass Index Body Surface Area  
 22.11 kg/m 2 1.51 m 2 Your Updated Medication List  
  
   
This list is accurate as of: 6/13/17 10:10 AM.  Always use your most recent med list.  
  
  
  
  
 albuterol 90 mcg/actuation inhaler Commonly known as:  PROVENTIL HFA, VENTOLIN HFA, PROAIR HFA Take 1 Puff by inhalation every six (6) hours as needed for Wheezing. aspirin delayed-release 81 mg tablet Take  by mouth daily. atorvastatin 80 mg tablet Commonly known as:  LIPITOR Take 1 Tab by mouth daily. EVISTA 60 mg tablet Generic drug:  raloxifene Take  by mouth daily. ezetimibe 10 mg tablet Commonly known as:  Jorge Penny Take 1 Tab by mouth daily. fluticasone 50 mcg/actuation nasal spray Commonly known as:  Creasie Cockayne 2 Sprays by Both Nostrils route daily. tiotropium 18 mcg inhalation capsule Commonly known as:  [address] Take 1 Cap by inhalation daily. VITAMIN D3 1,000 unit Cap Generic drug:  cholecalciferol Take  by mouth. Follow-up Instructions Return in about 6 months (around 12/13/2017). To-Do List   
 12/11/2017 Lab:  LIPID PANEL Introducing [facility] & HEALTH SERVICES! Bernard Rocha introduces [employer] patient portal. Now you can access parts of your medical record, email your doctor's office, and request medication refills online. 1. In your internet browser, go to [email or web address] 2. Click on the First Time User? Click Here link in the Sign In box. You will see the New Member Sign Up page. 3. Enter your [employer] Access Code exactly as it appears below. You will not need to use this code after youve completed the sign-up process. If you do not sign up before the expiration date, you must request a new code. · [employer] Access Code: [email or web address] Expires: 8/14/2017 10:18 AM 
 
4. Enter the last four digits of your Social Security Number (xxxx) and Date of Birth (mm/dd/yyyy) as indicated and click Submit. You will be taken to the next sign-up page. 5. Create a [employer] ID. This will be your [employer] login ID and cannot be changed, so think of one that is secure and easy to remember. 6. Create a [employer] password. You can change your password at any time. 7. Enter your Password Reset Question and Answer. This can be used at a later time if you forget your password. 8. Enter your e-mail address. You will receive e-mail notification when new information is available in [address] 9. Click Sign Up. You can now view and download portions of your medical record. 10. Click the Download Summary menu link to download a portable copy of your medical information. If you have questions, please visit the Frequently Asked Questions section of the [employer] website. Remember, [employer] is NOT to be used for urgent needs. For medical emergencies, dial 911. Now available from your iPhone and Android! Please provide this summary of care documentation to your next provider. Your primary care clinician is listed as Bearl Antonio. If you have any questions after today's visit, please call [phone].

## 2017-06-13 NOTE — PROGRESS NOTES
1. Have you been to the ER, urgent care clinic or hospitalized since your last visit? NO.     2. Have you seen or consulted any other health care providers outside of the [address] [place] [address] since your last visit (Include any pap smears or colon screening)? NO      Do you have an Advanced Directive? NO    Would you like information on Advanced Directives?  YES

## 2017-07-18 DIAGNOSIS — E78.5 DYSLIPIDEMIA: ICD-10-CM

## 2017-07-18 DIAGNOSIS — J41.0 SIMPLE CHRONIC BRONCHITIS (HCC): ICD-10-CM

## 2017-07-18 RX ORDER — EZETIMIBE 10 MG/1
10 TABLET ORAL DAILY
Qty: 90 TAB | Refills: 3 | Status: SHIPPED | OUTPATIENT
Start: 2017-07-18 | End: 2018-08-14 | Stop reason: SDUPTHER

## 2017-07-18 RX ORDER — ATORVASTATIN CALCIUM 80 MG/1
80 TABLET, FILM COATED ORAL DAILY
Qty: 90 TAB | Refills: 3 | Status: SHIPPED | OUTPATIENT
Start: 2017-07-18 | End: 2018-08-14 | Stop reason: SDUPTHER

## 2017-12-13 ENCOUNTER — HOSPITAL ENCOUNTER (OUTPATIENT)
Dept: LAB | Age: 77
Discharge: HOME | End: 2017-12-13
Payer: MEDICARE

## 2017-12-13 DIAGNOSIS — E78.5 DYSLIPIDEMIA: ICD-10-CM

## 2017-12-13 LAB
CHOLEST SERPL-MCNC: 109 MG/DL
HDLC SERPL-MCNC: 49 MG/DL (ref 40–60)
HDLC SERPL: 2.2 {RATIO} (ref 0–5)
LDLC SERPL CALC-MCNC: 45.4 MG/DL (ref 0–100)
LIPID PROFILE,FLP: NORMAL
TRIGL SERPL-MCNC: 73 MG/DL (ref ?–150)
VLDLC SERPL CALC-MCNC: 14.6 MG/DL

## 2017-12-13 PROCEDURE — 80061 LIPID PANEL: CPT | Performed by: INTERNAL MEDICINE

## 2017-12-13 PROCEDURE — 36415 COLL VENOUS BLD VENIPUNCTURE: CPT | Performed by: INTERNAL MEDICINE

## 2017-12-19 NOTE — PROGRESS NOTES
69 y.o. WHITE OR  female who presents for f/u    Denies any cardiovascular complaints, no exercise but remains active with household chores    Using the spiriva daily and only using the rescue inhaler 3-4x/month. The allergies are controlled on flonase. She wants chantix sent in for smoking cessation. Has succeeded with this in the past.    Denies any GI complaints on the PPI. No gu complaints    Her daughter is concerned she's developing dementia. She herself feels that it's not an issue but not averse to doing MMSE today    LAST MEDICARE WELLNESS EXAM: 4/30/14, 5/1/15, 5/19/16, 5/16/17      ACP 5/19/16, 5/16/17    Past Medical History:   Diagnosis Date    Allergic rhinitis     Colon adenoma     1/15 Dr Ximena Hair; polyp 2006 Dr Ornelas Persons COPD     on films    DJD (degenerative joint disease)     Dyslipidemia     FHx: heart disease     GERD (gastroesophageal reflux disease)     s/p dilation Dr. Javi Britton 2006; [facility] and esophagitis 1/15 Dr Ximena Hair    Monoclonal gammopathy 2011 Dr. Kelechi Mustafa Osteoporosis Dr. Yahir Domínguez, t score -0.6 spine, -2.7 hip     took forteo 2012 to 2014 Dr Herring    Recurrent BCC (basal cell carcinoma)     Dr. Rosaura García Tobacco dependence syndrome     UTI (urinary tract infection)      Past Surgical History:   Procedure Laterality Date    BREAST SURGERY PROCEDURE UNLISTED  1992    negative right breast biopsy Dr. Tisha Britton 2006 polyp; Dr Ximena Hair 1/15 adenoma    HX HYSTERECTOMY  1970s    for precancerous lesions in the cervix    HX ORTHOPAEDIC      DEXA t score 0.32 spine, -2.28 hip FRAX 5.3 hip Dr Yahir Domínguez (12/13); spine 0.68, hip -2.51 w FRAX 7.2 (12/15)    HX ORTHOPAEDIC  05/2017    right CTS release and thumb surgery Dr Carbajal      s/p c [facility] surgery [facility] 2010?     NEUROLOGICAL PROCEDURE UNLISTED  12/2017    MMSE 30/30     Social History     Social History    Marital status:  Spouse name: N/A    Number of children: 3    Years of education: N/A     Occupational History    retired [employer]      Social History Main Topics    Smoking status: Current Some Day Smoker     Packs/day: 1.00    Smokeless tobacco: Never Used    Alcohol use No    Drug use: No    Sexual activity: Not on file     Other Topics Concern    Not on file     Social History Narrative     Allergies   Allergen Reactions    Pcn [Penicillins] Rash       Current Outpatient Prescriptions   Medication Sig    denosumab (PROLIA) 60 mg/mL injection 60 mg by SubCUTAneous route.  CALCIUM CARBONATE (CALCIUM 500 PO) Take  by mouth.  cyanocobalamin 1,000 mcg tablet Take 1,000 mcg by mouth daily.  varenicline (CHANTIX STARTER SIOMARA) 0.5 mg (11)- 1 mg (42) DsPk Take as directed    varenicline (CHANTIX) 1 mg tablet Take 1 Tab by mouth two (2) times a day.  tiotropium (SPIRIVA WITH HANDIHALER) 18 mcg inhalation capsule Take 1 Cap by inhalation daily.  atorvastatin (LIPITOR) 80 mg tablet Take 1 Tab by mouth daily.  ezetimibe (ZETIA) 10 mg tablet Take 1 Tab by mouth daily.  albuterol (PROVENTIL HFA, VENTOLIN HFA, PROAIR HFA) 90 mcg/actuation inhaler Take 1 Puff by inhalation every six (6) hours as needed for Wheezing.  fluticasone (FLONASE) 50 mcg/actuation nasal spray 2 Sprays by Both Nostrils route daily.  Cholecalciferol, Vitamin D3, (VITAMIN D3) 1,000 unit Cap Take  by mouth.  aspirin delayed-release 81 mg tablet Take  by mouth daily.  raloxifene (EVISTA) 60 mg tablet Take  by mouth daily. No current facility-administered medications for this visit.       REVIEW OF SYSTEMS:  gyn 2006, mammo 11/15, DEXA 12/15 Dr Jorge L Brock, colo 1/15 Dr Cayla Veloz, sees Dr Bea Machuca and Dr Agnes Longoria  Ophtho - no vision change or eye pain  Oral - no mouth pain, tongue or tooth problems  Ears - no hearing loss, ear pain, fullness, no swallowing problems  Cardiac - no CP, PND, orthopnea, edema, palpitations or syncope  Chest - no breast masses  Resp - no wheezing, chronic coughing, dyspnea  GI - no heartburn, nausea, vomiting, change in bowel habits, bleeding, hemorrhoids  Constitutional - no wt loss, night sweats, unexplained fevers  Neuro - no focal weakness, numbness, paresthesias, incoordination, ataxia, involuntary movements  Endo - no polyuria, polydipsia, nocturia, hot flashes    Visit Vitals    /80 (BP 1 Location: Left arm, BP Patient Position: Sitting)    Pulse (!) 105    Temp 98.1 °F (36.7 °C) (Oral)    Resp 16    Ht 5' 1\" (1.549 m)    Wt 118 lb 12.8 oz (53.9 kg)    SpO2 97%    BMI 22.45 kg/m2     A&O x3  Affect is appropriate. Mood stable  No apparent distress  Anicteric, no JVD, adenopathy or thyromegaly. No carotid bruits or radiated murmur  Lungs clear to auscultation, no wheezes or rales  Heart showed regular rate and rhythm. No murmur, rubs, gallops  Abdomen soft nontender, no hepatosplenomegaly or masses. Extremities without edema. Pulses 1-2+ symmetrically.       LABS  From 5/11 showed   gluc 90,   cr 0.70,    alt 21,          chol 149, tg 100, hdl 29, ldl-c 100, wbc 9.8, hb 14.2, plt 276  From 10/11 showed gluc 100, cr 1.04, gfr 55  From 11/11 showed                   ldl-p 1685, chol 157, tg 116, hdl 41, ldl-c 93,   spep+  From 3/12 showed   gluc 91,   cr 0.84, gfr 70,  alt 17, ldl-p 1309, chol 126, tg 86,   hdl 37, ldl-c 72  From 8/12 showed   gluc 95,   cr 0.86, gfr 68,  alt 21  From 3/13 showed                   ldl-p 1281, chol 118, tg 93,   hdl 40, ldl-c 59  From 9/13 showed   gluc 92,   cr 0.82, gfr 72,  alt 19, ldl-p 1268, chol 136, tg 71,   hdl 42, ldl-c 80,   wbc 7.7, hb 13.4, plt 310, ua neg pro  From 3/14 showed   gluc 92,   cr 0.88, gfr>60, alt<5,          chol 122, tg 104, hdl 35, ldl-c 66,   wbc 7.5, hb 12.7, plt 312, ua neg  From 10/14 showed               chol 140, tg 100, hdl 36, ldl-c 84  From 4/15 showed   gluc 94,   cr 0.96, gfr>60, alt 6,          wbc 9.3, hb 14.0, plt 313  From 11/15 showed               chol 144, tg 79,   hdl 44, ldl-c 84  From 5/16 showed   gluc 82,   cr 0.82, gfr>60, alt 26,          wbc 5.3, hb 14.6, plt 241  From 11/16 showed               chol 217, tg 165, hdl 47, ldl-c 137  From 5/17 showed   gluc 90,   cr 0.75, gfr>60, alt 29,          wbc 7.4, hb 14.7, plt 224  From 6/17 showed   gluc 86,   cr 0.86, gfr>60,           wbc 8.4, hb 15.4, plt 235    MMSE 30/30    Results for orders placed or performed during the hospital encounter of 12/13/17   LIPID PANEL   Result Value Ref Range    LIPID PROFILE          Cholesterol, total 109 <200 MG/DL    Triglyceride 73 <150 MG/DL    HDL Cholesterol 49 40 - 60 MG/DL    LDL, calculated 45.4 0 - 100 MG/DL    VLDL, calculated 14.6 MG/DL    CHOL/HDL Ratio 2.2 0 - 5.0       Patient Active Problem List   Diagnosis Code    Colon polyps Dr. Tamiko Tafoya 2006 K63.5    Osteoporosis Dr. Jeremiah Valencia M81.0    Monoclonal gammopathy 2011 Dr. Jesus Nieves D47.2    COPD on films J44.9    Dyslipidemia E78.5    Arthritis, degenerative M19.90    Gastroesophageal reflux disease without esophagitis K21.9    Advance directive discussed with patient Z71.89    Tobacco dependence syndrome F17.200     ASSESSMENT AND PLAN:  1. Dyslipidemia. Continue current regimen  2. GERD. Daily PPI and avoidance measures  3. DJD. Continue prn otc meds  4. Colon polyps. Fiber  5. COPD. Continue current. 6.  Osteoporosis. F/U Dr. Jeremiah Valencia as sched  7. MGUS. F/U Dr. Vani Golden  8. Allergies. F/U Dr. Tabitha Pritchett, flonase cont  9. Smoking cessation. Chantix given, sfx reviewed, total time 3 min  10. Memory issues. MMSE ok, will observe        RTC 6/18    Above conditions discussed at length and patient vocalized understanding.   All questions answered to patient satifaction

## 2017-12-22 ENCOUNTER — OFFICE VISIT (OUTPATIENT)
Dept: INTERNAL MEDICINE CLINIC | Age: 77
End: 2017-12-22

## 2017-12-22 VITALS
HEART RATE: 105 BPM | TEMPERATURE: 98.1 F | RESPIRATION RATE: 16 BRPM | SYSTOLIC BLOOD PRESSURE: 132 MMHG | OXYGEN SATURATION: 97 % | DIASTOLIC BLOOD PRESSURE: 80 MMHG | HEIGHT: 61 IN | BODY MASS INDEX: 22.43 KG/M2 | WEIGHT: 118.8 LBS

## 2017-12-22 DIAGNOSIS — F17.200 TOBACCO DEPENDENCE SYNDROME: ICD-10-CM

## 2017-12-22 DIAGNOSIS — D47.2 MONOCLONAL GAMMOPATHY: ICD-10-CM

## 2017-12-22 DIAGNOSIS — E78.5 DYSLIPIDEMIA: Primary | ICD-10-CM

## 2017-12-22 DIAGNOSIS — Z23 ENCOUNTER FOR IMMUNIZATION: ICD-10-CM

## 2017-12-22 DIAGNOSIS — J44.9 CHRONIC OBSTRUCTIVE PULMONARY DISEASE, UNSPECIFIED COPD TYPE (HCC): ICD-10-CM

## 2017-12-22 DIAGNOSIS — R41.3 MEMORY LOSS: ICD-10-CM

## 2017-12-22 RX ORDER — VARENICLINE TARTRATE 25 MG
KIT ORAL
Qty: 1 DOSE PACK | Refills: 0 | Status: SHIPPED | OUTPATIENT
Start: 2017-12-22 | End: 2018-08-14

## 2017-12-22 RX ORDER — VARENICLINE TARTRATE 1 MG/1
1 TABLET, FILM COATED ORAL 2 TIMES DAILY
Qty: 60 TAB | Refills: 4 | Status: SHIPPED | OUTPATIENT
Start: 2017-12-22 | End: 2018-04-24 | Stop reason: SDUPTHER

## 2017-12-22 RX ORDER — LANOLIN ALCOHOL/MO/W.PET/CERES
1000 CREAM (GRAM) TOPICAL DAILY
COMMUNITY
End: 2020-09-30

## 2017-12-22 NOTE — PROGRESS NOTES
1. Have you been to the ER, urgent care clinic or hospitalized since your last visit? NO.     2. Have you seen or consulted any other health care providers outside of the [address] [place] [address] since your last visit (Include any pap smears or colon screening)? NO      Do you have an Advanced Directive?  YES

## 2017-12-22 NOTE — PROGRESS NOTES
VO from Dr. Mayur Benedict for High Dose Influenza             Given by Elizabeth Arce LPN in left Deltoid. Pt denies any acute infections at this time or fever. Pt aware of reactions and symptoms. Advised pt to stay post 15 mins to monitor for any serious reactions. No reactions noted at this time.

## 2017-12-22 NOTE — MR AVS SNAPSHOT
Visit Information Date & Time Provider Department Dept. Phone Encounter #  
 12/22/2017  2:00 PM Martha Soto MD [facility] [phone] [id number] Upcoming Health Maintenance Date Due Influenza Age 5 to Adult 8/1/2017 MEDICARE YEARLY EXAM 5/17/2018 GLAUCOMA SCREENING Q2Y 11/28/2018 COLONOSCOPY 1/14/2020 DTaP/Tdap/Td series (2 - Td) 5/25/2021 Allergies as of 12/22/2017  Review Complete On: 12/22/2017 By: Graciela Pete Severity Noted Reaction Type Reactions Pcn [Penicillins]  10/12/2011    Rash Current Immunizations  Reviewed on 5/19/2016 Name Date Influenza High Dose Vaccine PF 12/1/2016 Influenza Vaccine 10/22/2014, 9/12/2013 Influenza Vaccine Split 12/2/2011 Influenza Vaccine Whole 12/18/2008 Pneumococcal Conjugate (PCV-13) 5/19/2016 10:46 AM  
 TDAP Vaccine 5/25/2011 ZZZ-RETIRED (DO NOT USE) Pneumococcal Vaccine (Unspecified Type) 11/3/2006 Zoster 1/1/2008 Not reviewed this visit Vitals BP Pulse Temp Resp Height(growth percentile) Weight(growth percentile) 132/80 (BP 1 Location: Left arm, BP Patient Position: Sitting) (!) 105 98.1 °F (36.7 °C) (Oral) 16 5' 1\" (1.549 m) 118 lb 12.8 oz (53.9 kg) SpO2 BMI OB Status Smoking Status 97% 22.45 kg/m2 Hysterectomy Current Some Day Smoker Vitals History BMI and BSA Data Body Mass Index Body Surface Area  
 22.45 kg/m 2 1.52 m 2 Your Updated Medication List  
  
   
This list is accurate as of: 12/22/17  3:01 PM.  Always use your most recent med list.  
  
  
  
  
 albuterol 90 mcg/actuation inhaler Commonly known as:  PROVENTIL HFA, VENTOLIN HFA, PROAIR HFA Take 1 Puff by inhalation every six (6) hours as needed for Wheezing. aspirin delayed-release 81 mg tablet Take  by mouth daily. atorvastatin 80 mg tablet Commonly known as:  LIPITOR Take 1 Tab by mouth daily.   
  
 CALCIUM 500 PO  
 Take  by mouth.  
  
 cyanocobalamin 1,000 mcg tablet Take 1,000 mcg by mouth daily. EVISTA 60 mg tablet Generic drug:  raloxifene Take  by mouth daily. ezetimibe 10 mg tablet Commonly known as:  Arelis Billow Take 1 Tab by mouth daily. fluticasone 50 mcg/actuation nasal spray Commonly known as:  Florence Shames 2 Sprays by Both Nostrils route daily. PROLIA 60 mg/mL injection Generic drug:  denosumab 60 mg by SubCUTAneous route. tiotropium 18 mcg inhalation capsule Commonly known as:  [address] Take 1 Cap by inhalation daily. VITAMIN D3 1,000 unit Cap Generic drug:  cholecalciferol Take  by mouth. Introducing [facility]! [employer] introduces [email or web address] patient portal. Now you can access parts of your medical record, email your doctor's office, and request medication refills online. 1. In your internet browser, go to [email or web address] 2. Click on the First Time User? Click Here link in the Sign In box. You will see the New Member Sign Up page. 3. Enter your [email or web address] Access Code exactly as it appears below. You will not need to use this code after youve completed the sign-up process. If you do not sign up before the expiration date, you must request a new code. · [email or web address] Access Code: [email or web address] Expires: 3/13/2018  8:39 AM 
 
4. Enter the last four digits of your Social Security Number (xxxx) and Date of Birth (mm/dd/yyyy) as indicated and click Submit. You will be taken to the next sign-up page. 5. Create a Vital Farm[employer] ID. This will be your [email or web address] login ID and cannot be changed, so think of one that is secure and easy to remember. 6. Create a UPSIDO.com password. You can change your password at any time. 7. Enter your Password Reset Question and Answer. This can be used at a later time if you forget your password. 8. Enter your e-mail address.  You will receive e-mail notification when new information is available in SafeTool. 9. Click Sign Up. You can now view and download portions of your medical record. 10. Click the Download Summary menu link to download a portable copy of your medical information. If you have questions, please visit the Frequently Asked Questions section of the SafeTool website. Remember, SafeTool is NOT to be used for urgent needs. For medical emergencies, dial 911. Now available from your iPhone and Android! Please provide this summary of care documentation to your next provider. Your primary care clinician is listed as Sean Jasso. If you have any questions after today's visit, please call [phone].

## 2017-12-26 ENCOUNTER — TELEPHONE (OUTPATIENT)
Dept: INTERNAL MEDICINE CLINIC | Age: 77
End: 2017-12-26

## 2018-04-20 NOTE — PROGRESS NOTES
69 y.o. WHITE OR  female who presents for f/u    Denies any cardiovascular complaints, no exercise but remains active with household chores. She's been having interimttne vague heasdache, has not been checking her bp. Review of the last few readings in the office shows that she does meet current criteria for HTN    Vitals 4/24/2018 12/22/2017 6/13/2017 5/16/2017 12/1/2016   Blood Pressure 170/96 132/80 146/80 134/83 138/84     Vitals 5/19/2016   Blood Pressure 144/88     Her daughter is now staying with her almost full time and 'she doesn't want her doing anything'. The activity levels have dropped, she's eating more, not avoiding salt and the weight has gone up as above    Using the spiriva daily and only using the rescue inhaler sparingly. The allergies are controlled on flonase. We apparently did not mail to her the chantix script we had printed out at the last encounter    Denies any GI complaints on the PPI.       No gu complaints    LAST MEDICARE WELLNESS EXAM: 4/30/14, 5/1/15, 5/19/16, 5/16/17, 4/24/18        ACP: 5/19/16, 5/16/17, 4/24/18    Past Medical History:   Diagnosis Date    Allergic rhinitis     Colon adenoma     1/15 Dr Brad Barton; polyp 2006 Dr Soumya Brown COPD     on films    DJD (degenerative joint disease)     Dyslipidemia     FHx: heart disease     GERD (gastroesophageal reflux disease)     s/p dilation Dr. Melina Mcfarland 2006; [facility] and esophagitis 1/15 Dr Brad Barton    Monoclonal gammopathy 2011 Dr. Bowers Lat Osteoporosis Dr. Juanita Woody, t score -0.6 spine, -2.7 hip     took forteo 2012 to 2014 Dr Herring    Recurrent BCC (basal cell carcinoma)     Dr. Mulu Ku dependence syndrome     UTI (urinary tract infection)      Past Surgical History:   Procedure Laterality Date    BREAST SURGERY PROCEDURE UNLISTED  1992    negative right breast biopsy Dr. Silva Mcfarland 2006 polyp; Dr Brad Barton 1/15 adenoma    HX HYSTERECTOMY  1970s    for precancerous lesions in the cervix    HX ORTHOPAEDIC      DEXA t score 0.32 spine, -2.28 hip FRAX 5.3 hip Dr Karuna Ta (12/13); spine 0.68, hip -2.51 w FRAX 7.2 (12/15)    HX ORTHOPAEDIC  05/2017    right CTS release and thumb surgery Dr Nereida Gardner      s/p c spine surgery [id number] 2010?  NEUROLOGICAL PROCEDURE UNLISTED  12/2017    MMSE 30/30     Social History     Social History    Marital status:      Spouse name: N/A    Number of children: 3    Years of education: N/A     Occupational History    retired [employer]      Social History Main Topics    Smoking status: Current Some Day Smoker     Packs/day: 1.00    Smokeless tobacco: Never Used    Alcohol use No    Drug use: No    Sexual activity: Not on file     Other Topics Concern    Not on file     Social History Narrative     Allergies   Allergen Reactions    Pcn [Penicillins] Rash       Current Outpatient Prescriptions   Medication Sig    varenicline (CHANTIX) 1 mg tablet Take 1 Tab by mouth two (2) times a day.  amLODIPine (NORVASC) 5 mg tablet Take 1 Tab by mouth daily.  denosumab (PROLIA) 60 mg/mL injection 60 mg by SubCUTAneous route.  CALCIUM CARBONATE (CALCIUM 500 PO) Take  by mouth.  cyanocobalamin 1,000 mcg tablet Take 1,000 mcg by mouth daily.  tiotropium (SPIRIVA WITH HANDIHALER) 18 mcg inhalation capsule Take 1 Cap by inhalation daily.  atorvastatin (LIPITOR) 80 mg tablet Take 1 Tab by mouth daily.  ezetimibe (ZETIA) 10 mg tablet Take 1 Tab by mouth daily.  albuterol (PROVENTIL HFA, VENTOLIN HFA, PROAIR HFA) 90 mcg/actuation inhaler Take 1 Puff by inhalation every six (6) hours as needed for Wheezing.  fluticasone (FLONASE) 50 mcg/actuation nasal spray 2 Sprays by Both Nostrils route daily. (Patient taking differently: 2 Sprays by Both Nostrils route as needed.)    Cholecalciferol, Vitamin D3, (VITAMIN D3) 1,000 unit Cap Take  by mouth.       aspirin delayed-release 81 mg tablet Take  by mouth daily.  varenicline (CHANTIX STARTER SIOMARA) 0.5 mg (11)- 1 mg (42) DsPk Take as directed    raloxifene (EVISTA) 60 mg tablet Take  by mouth daily. No current facility-administered medications for this visit. REVIEW OF SYSTEMS:  Hyst, mammo 11/15, DEXA 12/15 Dr Josué Valente, colo 1/15 Dr Corey Bar, sees Dr Hugh Jamison and Dr Madison Camacho  Ophtho - no vision change or eye pain  Oral - no mouth pain, tongue or tooth problems  Ears - no hearing loss, ear pain, fullness, no swallowing problems  Cardiac - no CP, PND, orthopnea, edema, palpitations or syncope  Chest - no breast masses  Resp - no wheezing, chronic coughing, dyspnea  GI - no heartburn, nausea, vomiting, change in bowel habits, bleeding, hemorrhoids  Constitutional - no wt loss, night sweats, unexplained fevers  Neuro - no focal weakness, numbness, paresthesias, incoordination, ataxia, involuntary movements  Endo - no polyuria, polydipsia, nocturia, hot flashes    Visit Vitals    BP (!) 162/92    Pulse 92    Temp 99 °F (37.2 °C) (Oral)    Ht 5' 1\" (1.549 m)    Wt 126 lb (57.2 kg)    SpO2 96%    BMI 23.81 kg/m2     A&O x3  Affect is appropriate. Mood stable  No apparent distress  Anicteric, no JVD, adenopathy or thyromegaly. No carotid bruits or radiated murmur  Lungs clear to auscultation, no wheezes or rales  Heart showed regular rate and rhythm. No murmur, rubs, gallops  Abdomen soft nontender, no hepatosplenomegaly or masses. Extremities without edema. Pulses 1-2+ symmetrically.       LABS  From 5/11 showed   gluc 90,   cr 0.70,    alt 21,          chol 149, tg 100, hdl 29, ldl-c 100, wbc 9.8, hb 14.2, plt 276  From 10/11 showed gluc 100, cr 1.04, gfr 55  From 11/11 showed                   ldl-p 1685, chol 157, tg 116, hdl 41, ldl-c 93,   spep+  From 3/12 showed   gluc 91,   cr 0.84, gfr 70,  alt 17, ldl-p 1309, chol 126, tg 86,   hdl 37, ldl-c 72  From 8/12 showed   gluc 95,   cr 0.86, gfr 68,  alt 21  From 3/13 showed ldl-p 1281, chol 118, tg 93,   hdl 40, ldl-c 59  From 9/13 showed   gluc 92,   cr 0.82, gfr 72,  alt 19, ldl-p 1268, chol 136, tg 71,   hdl 42, ldl-c 80,   wbc 7.7, hb 13.4, plt 310, ua neg pro  From 3/14 showed   gluc 92,   cr 0.88, gfr>60, alt<5,          chol 122, tg 104, hdl 35, ldl-c 66,   wbc 7.5, hb 12.7, plt 312, ua neg  From 10/14 showed               chol 140, tg 100, hdl 36, ldl-c 84  From 4/15 showed   gluc 94,   cr 0.96, gfr>60, alt 6,          wbc 9.3, hb 14.0, plt 313  From 11/15 showed               chol 144, tg 79,   hdl 44, ldl-c 84  From 5/16 showed   gluc 82,   cr 0.82, gfr>60, alt 26,          wbc 5.3, hb 14.6, plt 241  From 11/16 showed               chol 217, tg 165, hdl 47, ldl-c 137  From 5/17 showed   gluc 90,   cr 0.75, gfr>60, alt 29,          wbc 7.4, hb 14.7, plt 224  From 6/17 showed   gluc 86,   cr 0.86, gfr>60,           wbc 8.4, hb 15.4, plt 235  From 9/17 showed   gluc 80,   cr 0.81, gfr 71,  alt 18,          wbc 8.4, hb 15.1, plt 267, fe 113, %sat 37, b12 959, FERNY+ igg mono lambda light chain, vit d 41.5  From 12/17 showed               chol 109, tg 73,   hdl 49, ldl-c 45    Patient Active Problem List   Diagnosis Code    Colon polyps Dr. Fidencio Saleh 2006 K63.5    Osteoporosis Dr. Cristy Bauer M81.0    Monoclonal gammopathy 2011 Dr. Radha Cullen D47.2    COPD on films J44.9    Dyslipidemia E78.5    Arthritis, degenerative M19.90    Gastroesophageal reflux disease without esophagitis K21.9    Advance directive discussed with patient Z71.89    Tobacco dependence syndrome F17.200    Primary hypertension I10     ASSESSMENT AND PLAN:  1. Dyslipidemia. Continue current regimen  2. GERD. Daily PPI and avoidance measures  3. DJD. Continue prn otc meds  4. Colon polyps. Fiber  5. COPD. Continue current. 6.  Osteoporosis. F/U Dr. Cristy Bauer as sched  7. MGUS. F/U Dr. Madhuri Donald  8. Allergies. F/U katty Presley  9. Smoking cessation.   Chantix printed and personally given to pt, sfx reviewed, total time 3 min  10. Probable htn. Will start norvasc after discussing possible sfx, she will inc activites, cut back salt, try to get weight down        RTC 8/18    Above conditions discussed at length and patient vocalized understanding.   All questions answered to patient satifaction

## 2018-04-24 ENCOUNTER — OFFICE VISIT (OUTPATIENT)
Dept: INTERNAL MEDICINE CLINIC | Age: 78
End: 2018-04-24

## 2018-04-24 VITALS
TEMPERATURE: 99 F | BODY MASS INDEX: 23.79 KG/M2 | OXYGEN SATURATION: 96 % | SYSTOLIC BLOOD PRESSURE: 162 MMHG | HEIGHT: 61 IN | HEART RATE: 92 BPM | WEIGHT: 126 LBS | DIASTOLIC BLOOD PRESSURE: 92 MMHG

## 2018-04-24 DIAGNOSIS — K21.9 GASTROESOPHAGEAL REFLUX DISEASE WITHOUT ESOPHAGITIS: ICD-10-CM

## 2018-04-24 DIAGNOSIS — Z12.31 ENCOUNTER FOR SCREENING MAMMOGRAM FOR MALIGNANT NEOPLASM OF BREAST: ICD-10-CM

## 2018-04-24 DIAGNOSIS — Z12.11 SCREEN FOR COLON CANCER: ICD-10-CM

## 2018-04-24 DIAGNOSIS — M81.0 AGE-RELATED OSTEOPOROSIS WITHOUT CURRENT PATHOLOGICAL FRACTURE: ICD-10-CM

## 2018-04-24 DIAGNOSIS — D47.2 MONOCLONAL GAMMOPATHY: ICD-10-CM

## 2018-04-24 DIAGNOSIS — Z13.31 SCREENING FOR DEPRESSION: ICD-10-CM

## 2018-04-24 DIAGNOSIS — E78.5 DYSLIPIDEMIA: ICD-10-CM

## 2018-04-24 DIAGNOSIS — Z00.00 MEDICARE ANNUAL WELLNESS VISIT, SUBSEQUENT: Primary | ICD-10-CM

## 2018-04-24 DIAGNOSIS — J44.9 CHRONIC OBSTRUCTIVE PULMONARY DISEASE, UNSPECIFIED COPD TYPE (HCC): ICD-10-CM

## 2018-04-24 DIAGNOSIS — Z13.39 SCREENING FOR ALCOHOLISM: ICD-10-CM

## 2018-04-24 DIAGNOSIS — I10 ESSENTIAL HYPERTENSION: ICD-10-CM

## 2018-04-24 DIAGNOSIS — Z71.89 ADVANCED DIRECTIVES, COUNSELING/DISCUSSION: ICD-10-CM

## 2018-04-24 DIAGNOSIS — M94.9 DISORDER OF BONE AND CARTILAGE: ICD-10-CM

## 2018-04-24 DIAGNOSIS — K63.5 HYPERPLASTIC COLONIC POLYP, UNSPECIFIED PART OF COLON: ICD-10-CM

## 2018-04-24 DIAGNOSIS — M89.9 DISORDER OF BONE AND CARTILAGE: ICD-10-CM

## 2018-04-24 DIAGNOSIS — F17.200 TOBACCO DEPENDENCE SYNDROME: ICD-10-CM

## 2018-04-24 RX ORDER — VARENICLINE TARTRATE 25 MG
KIT ORAL
Qty: 1 DOSE PACK | Refills: 0 | Status: CANCELLED | OUTPATIENT
Start: 2018-04-24

## 2018-04-24 RX ORDER — VARENICLINE TARTRATE 1 MG/1
1 TABLET, FILM COATED ORAL 2 TIMES DAILY
Qty: 60 TAB | Refills: 4 | Status: SHIPPED | OUTPATIENT
Start: 2018-04-24 | End: 2018-08-14

## 2018-04-24 RX ORDER — AMLODIPINE BESYLATE 5 MG/1
5 TABLET ORAL DAILY
Qty: 30 TAB | Refills: 5 | Status: SHIPPED | OUTPATIENT
Start: 2018-04-24 | End: 2018-10-19 | Stop reason: SDUPTHER

## 2018-04-24 NOTE — PROGRESS NOTES
1. Have you been to the ER, urgent care clinic or hospitalized since your last visit? NO.     2. Have you seen or consulted any other health care providers outside of the [facility] since your last visit (Include any pap smears or colon screening)? NO      Do you have an Advanced Directive? NO    Would you like information on Advanced Directives?  NO    Chief Complaint   Patient presents with    Cholesterol Problem     4 month follow up

## 2018-04-24 NOTE — MR AVS SNAPSHOT
[address] [facility] [address] 
 
 
 [address], [address] [place] [address] [facility] Se 
[phone] Patient: Chloé Mixon MRN: [id number] Visit Information Date & Time Provider Department Dept. Phone Encounter #  
 4/24/2018  1:45 PM Remedios May MD [facility] [phone] Follow-up Instructions Return in about 4 months (around 8/24/2018). Your Appointments 8/7/2018  9:35 AM  
LAB with [facility] NURSE VISIT [facility] ([facility]) Appt Note: lab  
 [address], [address] [place] [address] [address] [place]  
  
   
 [address], [address]  
  
    
 8/14/2018  2:00 PM  
Office Visit with Remedios May MD  
[facility]) Appt Note:  3-4 months [address], [address] [place] [address] [address] [place]  
  
   
 [address], [address] Upcoming Health Maintenance Date Due  
 MEDICARE YEARLY EXAM 5/17/2018 GLAUCOMA SCREENING Q2Y 11/28/2018 COLONOSCOPY 1/14/2020 DTaP/Tdap/Td series (2 - Td) 5/25/2021 Allergies as of 4/24/2018  Review Complete On: 4/24/2018 By: Remedios May MD  
  
 Severity Noted Reaction Type Reactions Pcn [Penicillins]  10/12/2011    Rash Current Immunizations  Reviewed on 5/19/2016 Name Date Influenza High Dose Vaccine PF 12/22/2017  2:40 PM, 12/1/2016 Influenza Vaccine 10/22/2014, 9/12/2013 Influenza Vaccine Split 12/2/2011 Influenza Vaccine Whole 12/18/2008 Pneumococcal Conjugate (PCV-13) 5/19/2016 10:46 AM  
 TDAP Vaccine 5/25/2011 ZZZ-RETIRED (DO NOT USE) Pneumococcal Vaccine (Unspecified Type) 11/3/2006 Zoster 1/1/2008 Not reviewed this visit You Were Diagnosed With   
  
 Codes Comments Essential hypertension    -  Primary ICD-10-CM: I10 
ICD-9-CM: 401.9 Tobacco dependence syndrome     ICD-10-CM: F17.200 ICD-9-CM: 305.1 Vitals BP Pulse Temp Height(growth percentile) Weight(growth percentile) SpO2  
 (!) 162/92 92 99 °F (37.2 °C) (Oral) 5' 1\" (1.549 m) 126 lb (57.2 kg) 96% BMI OB Status Smoking Status 23.81 kg/m2 Hysterectomy Current Some Day Smoker Vitals History BMI and BSA Data Body Mass Index Body Surface Area  
 23.81 kg/m 2 1.57 m 2 Your Updated Medication List  
  
   
This list is accurate as of 4/24/18  2:24 PM.  Always use your most recent med list.  
  
  
  
  
 albuterol 90 mcg/actuation inhaler Commonly known as:  PROVENTIL HFA, VENTOLIN HFA, PROAIR HFA Take 1 Puff by inhalation every six (6) hours as needed for Wheezing. aspirin delayed-release 81 mg tablet Take  by mouth daily. atorvastatin 80 mg tablet Commonly known as:  LIPITOR Take 1 Tab by mouth daily. CALCIUM 500 PO Take  by mouth.  
  
 cyanocobalamin 1,000 mcg tablet Take 1,000 mcg by mouth daily. EVISTA 60 mg tablet Generic drug:  raloxifene Take  by mouth daily. ezetimibe 10 mg tablet Commonly known as:  Tenisha Kristin Take 1 Tab by mouth daily. fluticasone 50 mcg/actuation nasal spray Commonly known as:  Art Latoya 2 Sprays by Both Nostrils route daily. PROLIA 60 mg/mL injection Generic drug:  denosumab 60 mg by SubCUTAneous route. tiotropium 18 mcg inhalation capsule Commonly known as:  [address] Take 1 Cap by inhalation daily. * varenicline 0.5 mg (11)- 1 mg (42) Dspk Commonly known as:  CHANTIX STARTER SIOMARA Take as directed * varenicline 1 mg tablet Commonly known as:  Clement Hem Take 1 Tab by mouth two (2) times a day. VITAMIN D3 1,000 unit Cap Generic drug:  cholecalciferol Take  by mouth. * Notice:   This list has 2 medication(s) that are the same as other medications prescribed for you. Read the directions carefully, and ask your doctor or other care provider to review them with you. Prescriptions Printed Refills  
 varenicline (CHANTIX) 1 mg tablet 4 Sig: Take 1 Tab by mouth two (2) times a day. Class: Print Route: Oral  
  
Follow-up Instructions Return in about 4 months (around 8/24/2018). Introducing [facility] SERVICES! [facility] introduces [employer] patient portal. Now you can access parts of your medical record, email your doctor's office, and request medication refills online. 1. In your internet browser, go to [email or web address] 2. Click on the First Time User? Click Here link in the Sign In box. You will see the New Member Sign Up page. 3. Enter your [employer] Access Code exactly as it appears below. You will not need to use this code after youve completed the sign-up process. If you do not sign up before the expiration date, you must request a new code. · [employer] Access Code: [email or web address] Expires: 7/23/2018  1:45 PM 
 
4. Enter the last four digits of your Social Security Number (xxxx) and Date of Birth (mm/dd/yyyy) as indicated and click Submit. You will be taken to the next sign-up page. 5. Create a [employer] ID. This will be your [employer] login ID and cannot be changed, so think of one that is secure and easy to remember. 6. Create a [employer] password. You can change your password at any time. 7. Enter your Password Reset Question and Answer. This can be used at a later time if you forget your password. 8. Enter your e-mail address. You will receive e-mail notification when new information is available in [address] 9. Click Sign Up. You can now view and download portions of your medical record. 10. Click the Download Summary menu link to download a portable copy of your medical information.  
 
If you have questions, please visit the Frequently Asked Questions section of the [employer]. Remember, [employer] is NOT to be used for urgent needs. For medical emergencies, dial 911. Now available from your iPhone and Android! Please provide this summary of care documentation to your next provider. Your primary care clinician is listed as Yudith Christopher. If you have any questions after today's visit, please call [phone].

## 2018-04-25 NOTE — PROGRESS NOTES
This is a Subsequent Medicare Annual Wellness Visit     I have reviewed the patient's medical history in detail and updated the computerized patient record. History     Past Medical History:   Diagnosis Date    Allergic rhinitis     Colon adenoma     1/15 Dr Doris Davila; polyp 2006 Dr Antonio Daniels COPD     on films    DJD (degenerative joint disease)     Dyslipidemia     FHx: heart disease     GERD (gastroesophageal reflux disease)     s/p dilation Dr. Carroll Barrera 2006; [place] and esophagitis 1/15 Dr Doris Davila    Monoclonal gammopathy 2011 Dr. Eliceo Rodriguez Osteoporosis Dr. Da Benites, t score -0.6 spine, -2.7 hip     took forteo 2012 to 2014 Dr Herring    Recurrent BCC (basal cell carcinoma)     Dr. Trevizo Bias dependence syndrome     UTI (urinary tract infection)       Past Surgical History:   Procedure Laterality Date    BREAST SURGERY PROCEDURE UNLISTED  1992    negative right breast biopsy Dr. Liseth Barrera 2006 polyp; Dr Doris Davila 1/15 adenoma    HX HYSTERECTOMY  1970s    for precancerous lesions in the cervix    HX ORTHOPAEDIC      DEXA t score 0.32 spine, -2.28 hip FRAX 5.3 hip Dr Da Benites (12/13); spine 0.68, hip -2.51 w FRAX 7.2 (12/15)    HX ORTHOPAEDIC  05/2017    right CTS release and thumb surgery Dr Jorje Crain      s/p c spine surgery [facility] 2010?  NEUROLOGICAL PROCEDURE UNLISTED  12/2017    MMSE 30/30     Current Outpatient Prescriptions   Medication Sig Dispense Refill    varenicline (CHANTIX) 1 mg tablet Take 1 Tab by mouth two (2) times a day. 60 Tab 4    amLODIPine (NORVASC) 5 mg tablet Take 1 Tab by mouth daily. 30 Tab 5    denosumab (PROLIA) 60 mg/mL injection 60 mg by SubCUTAneous route.  CALCIUM CARBONATE (CALCIUM 500 PO) Take  by mouth.  cyanocobalamin 1,000 mcg tablet Take 1,000 mcg by mouth daily.  tiotropium (SPIRIVA WITH HANDIHALER) 18 mcg inhalation capsule Take 1 Cap by inhalation daily.  90 Cap 3  atorvastatin (LIPITOR) 80 mg tablet Take 1 Tab by mouth daily. 90 Tab 3    ezetimibe (ZETIA) 10 mg tablet Take 1 Tab by mouth daily. 90 Tab 3    albuterol (PROVENTIL HFA, VENTOLIN HFA, PROAIR HFA) 90 mcg/actuation inhaler Take 1 Puff by inhalation every six (6) hours as needed for Wheezing. 1 Inhaler 5    fluticasone (FLONASE) 50 mcg/actuation nasal spray 2 Sprays by Both Nostrils route daily. (Patient taking differently: 2 Sprays by Both Nostrils route as needed.) 3 Bottle 3    Cholecalciferol, Vitamin D3, (VITAMIN D3) 1,000 unit Cap Take  by mouth.  aspirin delayed-release 81 mg tablet Take  by mouth daily.  varenicline (CHANTIX STARTER SIOMARA) 0.5 mg (11)- 1 mg (42) DsPk Take as directed 1 Dose Pack 0    raloxifene (EVISTA) 60 mg tablet Take  by mouth daily.        Allergies   Allergen Reactions    Pcn [Penicillins] Rash     Family History   Problem Relation Age of Onset    Heart Attack Father     Kidney Disease Mother      ADPKD    Diabetes Sister     Cancer Sister      leukemia     Social History   Substance Use Topics    Smoking status: Current Some Day Smoker     Packs/day: 1.00    Smokeless tobacco: Never Used    Alcohol use No     Depression Risk Factor Screening:     PHQ over the last two weeks 4/24/2018   Little interest or pleasure in doing things Not at all   Feeling down, depressed or hopeless Not at all   Total Score PHQ 2 0     SCREENINGS  Colonoscopy last done 1/15 Dr Brown Atkinson last done 11/15 and declined further  DEXA last done 12/15 Dr Lakeshia Woo last done >5 yrs ago     Immunization History   Administered Date(s) Administered    Influenza High Dose Vaccine PF 12/01/2016, 12/22/2017    Influenza Vaccine 09/12/2013, 10/22/2014    Influenza Vaccine Split 12/02/2011    Influenza Vaccine Whole 12/18/2008    Pneumococcal Conjugate (PCV-13) 05/19/2016    TDAP Vaccine 05/25/2011    ZZZ-RETIRED (DO NOT USE) Pneumococcal Vaccine (Unspecified Type) 11/03/2006  Zoster 01/01/2008     Alcohol Risk Factor Screening: On any occasion during the past 3 months, have you had more than 3 drinks containing alcohol? No    Do you average more than 7 drinks per week? No      Functional Ability and Level of Safety:     Hearing Loss   Moderate Dr Autumn Naidu - no acute complaints and is followed by [facility]    Activities of Daily Living   Self-care. Requires assistance with: no ADLs    Fall Risk     Fall Risk Assessment, last 12 mths 4/24/2018   Able to walk? Yes   Fall in past 12 months? No     Abuse Screen   Patient is not abused    Review of Systems   A comprehensive review of systems was negative except for that written in the HPI. Physical Examination     Visit Vitals    BP (!) 162/92    Pulse 92    Temp 99 °F (37.2 °C) (Oral)    Ht 5' 1\" (1.549 m)    Wt 126 lb (57.2 kg)    SpO2 96%    BMI 23.81 kg/m2       Evaluation of Cognitive Function:  Mood/affect:  neutral  Appearance: age appropriate, well dressed and within normal Limits  Family member/caregiver input: na    Patient Care Team:  Mai Andre MD as PCP - General (Internal Medicine)  Nithya Jesus RN as Ambulatory Care Navigator (Internal Medicine)  Steven Damon MD (Rheumatology)    Advice/Referrals/Counseling   Education and counseling provided:  Are appropriate based on today's review and evaluation  End-of-Life planning (with patient's consent)  Pneumococcal Vaccine  Screening Mammography  Colorectal cancer screening tests  Cardiovascular screening blood test  Bone mass measurement (DEXA)  Diabetes screening test    Assessment/Plan       ICD-10-CM ICD-9-CM    1. Medicare annual wellness visit, subsequent Z00.00 V70.0    2. Tobacco dependence syndrome F17.200 305.1 varenicline (CHANTIX) 1 mg tablet      [place] SMOKING AND TOBACCO USE CESSATION 3 - 10 MINUTES   3. Essential hypertension I10 401.9 amLODIPine (NORVASC) 5 mg tablet      LIPID PANEL      METABOLIC PANEL, BASIC   4.  Chronic obstructive pulmonary disease, unspecified [facility] type [facility] J44.9 496    5. Hyperplastic colonic polyp, unspecified part of colon K63.5 211.3    6. Age-related osteoporosis without current pathological fracture M81.0 733.01    7. Monoclonal gammopathy 2011 Dr. Sai Oliva D47.2 273.1    8. Dyslipidemia E78.5 272.4    9. Gastroesophageal reflux disease without esophagitis K21.9 530.81    10. Advanced directives, counseling/discussion Z71.89 V65.49 ADVANCE CARE PLANNING FIRST 30 MINS   11. Screening for alcoholism Z13.89 V79.1 [place] ANNUAL ALCOHOL SCREEN 15 MIN   12. Screening for depression Z13.89 V79.0 Gray 68   13. Screen for colon cancer Z12.11 V76.51    14. Encounter for screening mammogram for malignant neoplasm of breast Z12.31 V76.12    15. Disorder of bone and cartilage M89.9 733.90     M94.9       current treatment plan is effective  lab results and schedule of future lab studies reviewed with patient. End of life discussion undertaken.   Will work on getting amd in place  Colonoscopy beyond age  [de-identified] was declined by patient even after long discussion  DEXA per Dr Karuna Ta  Start amlo for bp

## 2018-08-07 ENCOUNTER — APPOINTMENT (OUTPATIENT)
Dept: INTERNAL MEDICINE CLINIC | Age: 78
End: 2018-08-07

## 2018-08-07 ENCOUNTER — HOSPITAL ENCOUNTER (OUTPATIENT)
Dept: LAB | Age: 78
Discharge: HOME | End: 2018-08-07
Payer: MEDICARE

## 2018-08-07 DIAGNOSIS — I10 ESSENTIAL HYPERTENSION: ICD-10-CM

## 2018-08-07 PROCEDURE — 36415 COLL VENOUS BLD VENIPUNCTURE: CPT | Performed by: INTERNAL MEDICINE

## 2018-08-07 PROCEDURE — 80048 BASIC METABOLIC PNL TOTAL CA: CPT | Performed by: INTERNAL MEDICINE

## 2018-08-07 PROCEDURE — 80061 LIPID PANEL: CPT | Performed by: INTERNAL MEDICINE

## 2018-08-08 LAB
ANION GAP SERPL CALC-SCNC: 11 MMOL/L (ref 3–18)
BUN SERPL-MCNC: 10 MG/DL (ref 7–18)
BUN/CREAT SERPL: 13 (ref 12–20)
CALCIUM SERPL-MCNC: 9.3 MG/DL (ref 8.5–10.1)
CHLORIDE SERPL-SCNC: 104 MMOL/L (ref 100–108)
CHOLEST SERPL-MCNC: 120 MG/DL
CO2 SERPL-SCNC: 24 MMOL/L (ref 21–32)
CREAT SERPL-MCNC: 0.78 MG/DL (ref 0.6–1.3)
GLUCOSE SERPL-MCNC: 89 MG/DL (ref 74–99)
HDLC SERPL-MCNC: 48 MG/DL (ref 40–60)
HDLC SERPL: 2.5 {RATIO} (ref 0–5)
LDLC SERPL CALC-MCNC: 56 MG/DL (ref 0–100)
LIPID PROFILE,FLP: NORMAL
POTASSIUM SERPL-SCNC: 4 MMOL/L (ref 3.5–5.5)
SODIUM SERPL-SCNC: 139 MMOL/L (ref 136–145)
TRIGL SERPL-MCNC: 80 MG/DL (ref ?–150)
VLDLC SERPL CALC-MCNC: 16 MG/DL

## 2018-08-10 NOTE — PROGRESS NOTES
69 y.o. WHITE OR  female who presents for f/u    Denies any cardiovascular complaints, no exercise but remains active with household chores. She was started on amlodipine at the last visit and her bp has been controlled when she checks    Using the spiriva daily and only using the rescue inhaler sparingly. The allergies are controlled on flonase. She did not fill the chantix and she's still not convinced she'll succeed w smoking cessation    Denies any GI complaints off the ppi    No gu complaints    LAST MEDICARE WELLNESS EXAM: 4/30/14, 5/1/15, 5/19/16, 5/16/17, 4/24/18        ACP: 5/19/16, 5/16/17, 4/24/18    Past Medical History:   Diagnosis Date    Allergic rhinitis     Colon adenoma     1/15 Dr Teofilo Wang; polyp 2006 Dr Anna Garnica COPD     on films    DJD (degenerative joint disease)     Dyslipidemia     FHx: heart disease     GERD (gastroesophageal reflux disease)     s/p dilation Dr. Tamiko Tafoya 2006; [facility] and esophagitis 1/15 Dr Teofilo Wang    Monoclonal gammopathy 2011 Dr. Avni Blackwell Osteoporosis Dr. Jeremiah Valencia, t score -0.6 spine, -2.7 hip     took forteo 2012 to 2014 Dr Herring    Recurrent BCC (basal cell carcinoma)     Dr. Taj Mike Tobacco dependence syndrome     UTI (urinary tract infection)      Past Surgical History:   Procedure Laterality Date    BREAST SURGERY PROCEDURE UNLISTED  1992    negative right breast biopsy Dr. Gerardo Tafoya 2006 polyp; Dr Teofilo Wang 1/15 adenoma    HX HYSTERECTOMY  1970s    for precancerous lesions in the cervix    HX ORTHOPAEDIC      DEXA t score 0.32 spine, -2.28 hip FRAX 5.3 hip Dr Jeremiah Valencia (12/13); spine 0.68, hip -2.51 w FRAX 7.2 (12/15)    HX ORTHOPAEDIC  05/2017    right CTS release and thumb surgery Dr Dc Santizo      s/p c spine surgery [facility] 2010?     NEUROLOGICAL PROCEDURE UNLISTED  12/2017    MMSE 30/30     Social History     Social History    Marital status:      Spouse name: N/A    Number of children: 3    Years of education: N/A     Occupational History    retired [employer]      Social History Main Topics    Smoking status: Current Some Day Smoker     Packs/day: 1.00    Smokeless tobacco: Never Used    Alcohol use No    Drug use: No    Sexual activity: Not on file     Other Topics Concern    Not on file     Social History Narrative     Allergies   Allergen Reactions    Pcn [Penicillins] Rash       Current Outpatient Prescriptions   Medication Sig    varenicline (CHANTIX) 1 mg tablet Take 1 Tab by mouth two (2) times a day.  amLODIPine (NORVASC) 5 mg tablet Take 1 Tab by mouth daily.  denosumab (PROLIA) 60 mg/mL injection 60 mg by SubCUTAneous route.  CALCIUM CARBONATE (CALCIUM 500 PO) Take  by mouth.  cyanocobalamin 1,000 mcg tablet Take 1,000 mcg by mouth daily.  varenicline (CHANTIX STARTER SIOMARA) 0.5 mg (11)- 1 mg (42) DsPk Take as directed    tiotropium (SPIRIVA WITH HANDIHALER) 18 mcg inhalation capsule Take 1 Cap by inhalation daily.  atorvastatin (LIPITOR) 80 mg tablet Take 1 Tab by mouth daily.  ezetimibe (ZETIA) 10 mg tablet Take 1 Tab by mouth daily.  albuterol (PROVENTIL HFA, VENTOLIN HFA, PROAIR HFA) 90 mcg/actuation inhaler Take 1 Puff by inhalation every six (6) hours as needed for Wheezing.  fluticasone (FLONASE) 50 mcg/actuation nasal spray 2 Sprays by Both Nostrils route daily. (Patient taking differently: 2 Sprays by Both Nostrils route as needed.)    raloxifene (EVISTA) 60 mg tablet Take  by mouth daily.  Cholecalciferol, Vitamin D3, (VITAMIN D3) 1,000 unit Cap Take  by mouth.  aspirin delayed-release 81 mg tablet Take  by mouth daily. No current facility-administered medications for this visit.       REVIEW OF SYSTEMS:  Hyst, mammo 11/15, DEXA 12/15 Dr Georgina Hurtado, colo 1/15 Dr Kris Toribio, sees Dr Rebecca Yan and Dr Evelin Sprague - no vision change or eye pain  Oral - no mouth pain, tongue or tooth problems  Ears - no hearing loss, ear pain, fullness, no swallowing problems  Cardiac - no CP, PND, orthopnea, edema, palpitations or syncope  Chest - no breast masses  Resp - no wheezing, chronic coughing, dyspnea  GI - no heartburn, nausea, vomiting, change in bowel habits, bleeding, hemorrhoids  Constitutional - no wt loss, night sweats, unexplained fevers  Neuro - no focal weakness, numbness, paresthesias, incoordination, ataxia, involuntary movements  Endo - no polyuria, polydipsia, nocturia, hot flashes    There were no vitals taken for this visit. A&O x3  Affect is appropriate. Mood stable  No apparent distress  Anicteric, no JVD, adenopathy or thyromegaly. No carotid bruits or radiated murmur  Lungs clear to auscultation, no wheezes or rales  Heart showed regular rate and rhythm. No murmur, rubs, gallops  Abdomen soft nontender, no hepatosplenomegaly or masses. Extremities without edema. Pulses 1-2+ symmetrically.       LABS  From 5/11 showed   gluc 90,   cr 0.70,    alt 21,          chol 149, tg 100, hdl 29, ldl-c 100, wbc 9.8, hb 14.2, plt 276  From 10/11 showed gluc 100, cr 1.04, gfr 55  From 11/11 showed                   ldl-p 1685, chol 157, tg 116, hdl 41, ldl-c 93,   spep+  From 3/12 showed   gluc 91,   cr 0.84, gfr 70,  alt 17, ldl-p 1309, chol 126, tg 86,   hdl 37, ldl-c 72  From 8/12 showed   gluc 95,   cr 0.86, gfr 68,  alt 21  From 3/13 showed                   ldl-p 1281, chol 118, tg 93,   hdl 40, ldl-c 59  From 9/13 showed   gluc 92,   cr 0.82, gfr 72,  alt 19, ldl-p 1268, chol 136, tg 71,   hdl 42, ldl-c 80,   wbc 7.7, hb 13.4, plt 310, ua neg pro  From 3/14 showed   gluc 92,   cr 0.88, gfr>60, alt<5,          chol 122, tg 104, hdl 35, ldl-c 66,   wbc 7.5, hb 12.7, plt 312, ua neg  From 10/14 showed               chol 140, tg 100, hdl 36, ldl-c 84  From 4/15 showed   gluc 94,   cr 0.96, gfr>60, alt 6,          wbc 9.3, hb 14.0, plt 313  From 11/15 showed               chol 144, tg 79,   hdl 44, ldl-c 84  From 5/16 showed   gluc 82,   cr 0.82, gfr>60, alt 26,          wbc 5.3, hb 14.6, plt 241  From 11/16 showed               chol 217, tg 165, hdl 47, ldl-c 137  From 5/17 showed   gluc 90,   cr 0.75, gfr>60, alt 29,          wbc 7.4, hb 14.7, plt 224  From 6/17 showed   gluc 86,   cr 0.86, gfr>60,           wbc 8.4, hb 15.4, plt 235  From 9/17 showed   gluc 80,   cr 0.81, gfr 71,  alt 18,          wbc 8.4, hb 15.1, plt 267, fe 113, %sat 37, b12 959, FERNY+ igg mono lambda light chain, vit d 41.5  From 12/17 showed               chol 109, tg 73,   hdl 49, ldl-c 45    Results for orders placed or performed during the hospital encounter of 08/07/18   LIPID PANEL   Result Value Ref Range    LIPID PROFILE          Cholesterol, total 120 <200 MG/DL    Triglyceride 80 <150 MG/DL    HDL Cholesterol 48 40 - 60 MG/DL    LDL, calculated 56 0 - 100 MG/DL    VLDL, calculated 16 MG/DL    CHOL/HDL Ratio 2.5 0 - 5.0     METABOLIC PANEL, BASIC   Result Value Ref Range    Sodium 139 136 - 145 mmol/L    Potassium 4.0 3.5 - 5.5 mmol/L    Chloride 104 100 - 108 mmol/L    CO2 24 21 - 32 mmol/L    Anion gap 11 3.0 - 18 mmol/L    Glucose 89 74 - 99 mg/dL    BUN 10 7.0 - 18 MG/DL    Creatinine 0.78 0.6 - 1.3 MG/DL    BUN/Creatinine ratio 13 12 - 20      GFR est AA >60 >60 ml/min/1.73m2    GFR est non-AA >60 >60 ml/min/1.73m2    Calcium 9.3 8.5 - 10.1 MG/DL     Patient Active Problem List   Diagnosis Code    Colon polyps Dr. Jaime Villafana 2006 K63.5    Osteoporosis Dr. Katy Grady M81.0    Monoclonal gammopathy 2011 Dr. Fabienne Hashimoto D47.2    COPD on films J44.9    Dyslipidemia E78.5    Arthritis, degenerative M19.90    Gastroesophageal reflux disease without esophagitis K21.9    Advance directive discussed with patient Z71.89    Tobacco dependence syndrome F17.200    Primary hypertension I10     ASSESSMENT AND PLAN:  1. Dyslipidemia. Continue current regimen  2. GERD. Daily PPI and avoidance measures  3. DJD. Continue prn otc meds  4. Colon polyps. Fiber  5. COPD. Continue current. 6.  Osteoporosis. F/U Dr. Terry Hobson as sched  7. MGUS. F/U Dr. Janna Epps  8. Allergies. F/U katty Witt  9. Smoking cessation. Has chantix script at home and will fill when she's ready  10. HTN. Continue current regimen. RTC 2/19    Above conditions discussed at length and patient vocalized understanding.   All questions answered to patient satisfaction

## 2018-08-14 ENCOUNTER — OFFICE VISIT (OUTPATIENT)
Dept: INTERNAL MEDICINE CLINIC | Age: 78
End: 2018-08-14

## 2018-08-14 VITALS
DIASTOLIC BLOOD PRESSURE: 72 MMHG | HEART RATE: 78 BPM | SYSTOLIC BLOOD PRESSURE: 124 MMHG | BODY MASS INDEX: 23.6 KG/M2 | HEIGHT: 61 IN | WEIGHT: 125 LBS | RESPIRATION RATE: 14 BRPM | OXYGEN SATURATION: 97 % | TEMPERATURE: 98.3 F

## 2018-08-14 DIAGNOSIS — F17.200 TOBACCO DEPENDENCE SYNDROME: ICD-10-CM

## 2018-08-14 DIAGNOSIS — J41.0 SIMPLE CHRONIC BRONCHITIS (HCC): ICD-10-CM

## 2018-08-14 DIAGNOSIS — J44.9 CHRONIC OBSTRUCTIVE PULMONARY DISEASE, UNSPECIFIED COPD TYPE (HCC): Primary | ICD-10-CM

## 2018-08-14 DIAGNOSIS — I10 PRIMARY HYPERTENSION: ICD-10-CM

## 2018-08-14 DIAGNOSIS — D47.2 MONOCLONAL GAMMOPATHY: ICD-10-CM

## 2018-08-14 DIAGNOSIS — E78.5 DYSLIPIDEMIA: ICD-10-CM

## 2018-08-14 NOTE — PROGRESS NOTES
1. Have you been to the ER, urgent care clinic or hospitalized since your last visit? NO.     2. Have you seen or consulted any other health care providers outside of the [address] [place] [address] since your last visit (Include any pap smears or colon screening)? NO      Do you have an Advanced Directive? NO    Would you like information on Advanced Directives?  NO    Chief Complaint   Patient presents with    Hypertension     4 month follow up with labs

## 2018-08-14 NOTE — MR AVS SNAPSHOT
[address] [facility] 
 
 
 [address], [address] [place] [address] [facility] 
[phone] Patient: Rickie Hernandez MRN: [id number] HCK:[id number] Visit Information Date & Time Provider Department Dept. Phone Encounter #  
 8/14/2018  2:00 PM Lee Owens MD [facility] [address] [place] [address] [phone] [id number] Your Appointments 2/13/2019  9:25 AM  
LAB with [facility] NURSE VISIT [facility] [address] [place] [address] ([address]) Appt Note: lab  
 [address], [address] [place] [address] [address]  
  
   
 [address], [address]  
  
    
 2/20/2019 11:00 AM  
Office Visit with Lee Owens MD  
[facility] [address] [place] [address] [facility]) Appt Note: 6 months [address], [address] [place] [address] [address]  
  
   
 [address], [address] Upcoming Health Maintenance Date Due Influenza Age 5 to Adult 8/1/2018 GLAUCOMA SCREENING Q2Y 11/28/2018 MEDICARE YEARLY EXAM 4/25/2019 COLONOSCOPY 1/14/2020 DTaP/Tdap/Td series (2 - Td) 5/25/2021 Allergies as of 8/14/2018  Review Complete On: 8/14/2018 By: Milena Cardona LPN Severity Noted Reaction Type Reactions Pcn [Penicillins]  10/12/2011    Rash Current Immunizations  Reviewed on 5/19/2016 Name Date Influenza High Dose Vaccine PF 12/22/2017  2:40 PM, 12/1/2016 Influenza Vaccine 10/22/2014, 9/12/2013 Influenza Vaccine Split 12/2/2011 Influenza Vaccine Whole 12/18/2008 Pneumococcal Conjugate (PCV-13) 5/19/2016 10:46 AM  
 TDAP Vaccine 5/25/2011 ZZZ-RETIRED (DO NOT USE) Pneumococcal Vaccine (Unspecified Type) 11/3/2006 Zoster 1/1/2008 Not reviewed this visit Vitals BP Pulse Temp Resp Height(growth percentile) Weight(growth percentile) 124/72 78 98.3 °F (36.8 °C) (Oral) 14 5' 1\" (1.549 m) 125 lb (56.7 kg) SpO2 BMI OB Status Smoking Status 97% 23.62 kg/m2 Hysterectomy Current Some Day Smoker Vitals History BMI and BSA Data Body Mass Index Body Surface Area  
 23.62 kg/m 2 1.56 m 2 Preferred Pharmacy Pharmacy Name Phone [address], [facility] [address] [phone] Your Updated Medication List  
  
   
This list is accurate as of 8/14/18  2:50 PM.  Always use your most recent med list.  
  
  
  
  
 albuterol 90 mcg/actuation inhaler Commonly known as:  PROVENTIL HFA, VENTOLIN HFA, PROAIR HFA Take 1 Puff by inhalation every six (6) hours as needed for Wheezing. amLODIPine 5 mg tablet Commonly known as:  Shaffer Rodolfo Take 1 Tab by mouth daily. aspirin delayed-release 81 mg tablet Take  by mouth daily. atorvastatin 80 mg tablet Commonly known as:  LIPITOR Take 1 Tab by mouth daily. CALCIUM 500 PO Take  by mouth.  
  
 cyanocobalamin 1,000 mcg tablet Take 1,000 mcg by mouth daily. EVISTA 60 mg tablet Generic drug:  raloxifene Take  by mouth daily. Indications: not taking  
  
 ezetimibe 10 mg tablet Commonly known as:  Pablo Jose Manuel Take 1 Tab by mouth daily. fluticasone 50 mcg/actuation nasal spray Commonly known as:  [facility] Finely 2 Sprays by Both Nostrils route daily. PROLIA 60 mg/mL injection Generic drug:  denosumab 60 mg by SubCUTAneous route. tiotropium 18 mcg inhalation capsule Commonly known as:  [address] Take 1 Cap by inhalation daily. * varenicline 0.5 mg (11)- 1 mg (42) Dspk Commonly known as:  CHANTIX STARTER SIOMARA Take as directed * varenicline 1 mg tablet Commonly known as:  Uriel Queen Take 1 Tab by mouth two (2) times a day. VITAMIN D3 1,000 unit Cap Generic drug:  cholecalciferol Take  by mouth. * Notice: This list has 2 medication(s) that are the same as other medications prescribed for you.  Read the directions carefully, and ask your doctor or other care provider to review them with you. Introducing [facility]! [employer] introduces [email or web address] patient portal. Now you can access parts of your medical record, email your doctor's office, and request medication refills online. 1. In your internet browser, go to [email or web address] 2. Click on the First Time User? Click Here link in the Sign In box. You will see the New Member Sign Up page. 3. Enter your [email or web address] Access Code exactly as it appears below. You will not need to use this code after youve completed the sign-up process. If you do not sign up before the expiration date, you must request a new code. · [email or web address] Access Code: [email or web address] Expires: 11/12/2018  2:13 PM 
 
4. Enter the last four digits of your Social Security Number (xxxx) and Date of Birth (mm/dd/yyyy) as indicated and click Submit. You will be taken to the next sign-up page. 5. Create a [email or web address] ID. This will be your [email or web address] login ID and cannot be changed, so think of one that is secure and easy to remember. 6. Create a [email or web address] password. You can change your password at any time. 7. Enter your Password Reset Question and Answer. This can be used at a later time if you forget your password. 8. Enter your e-mail address. You will receive e-mail notification when new information is available in [address]. 9. Click Sign Up. You can now view and download portions of your medical record. 10. Click the Download Summary menu link to download a portable copy of your medical information. If you have questions, please visit the Frequently Asked Questions section of the [email or web address] website. Remember, [email or web address] is NOT to be used for urgent needs. For medical emergencies, dial 911. Now available from your iPhone and Android! Please provide this summary of care documentation to your next provider. Your primary care clinician is listed as Emma Buckley.  If you have any questions after today's visit, please call [phone].

## 2018-08-15 RX ORDER — ATORVASTATIN CALCIUM 80 MG/1
80 TABLET, FILM COATED ORAL DAILY
Qty: 90 TAB | Refills: 3 | Status: SHIPPED | OUTPATIENT
Start: 2018-08-15 | End: 2019-02-20 | Stop reason: SDUPTHER

## 2018-08-15 RX ORDER — EZETIMIBE 10 MG/1
10 TABLET ORAL DAILY
Qty: 90 TAB | Refills: 3 | Status: SHIPPED | OUTPATIENT
Start: 2018-08-15 | End: 2019-02-20 | Stop reason: SDUPTHER

## 2018-08-15 NOTE — TELEPHONE ENCOUNTER
Last Visit: 08/14/2018 with MD Shila Toussaint    Next Appointment: 02/20/2019 with MD Shila Toussaint   Previous Refill Encounters: 07/18/2017 per MD Shila Toussaint Lipitor, Zetia, & Spiriva #90 with 3 refills     Requested Prescriptions     Pending Prescriptions Disp Refills    atorvastatin (LIPITOR) 80 mg tablet 90 Tab 3     Sig: Take 1 Tab by mouth daily.  ezetimibe (ZETIA) 10 mg tablet 90 Tab 3     Sig: Take 1 Tab by mouth daily.  tiotropium (SPIRIVA WITH HANDIHALER) 18 mcg inhalation capsule 90 Cap 3     Sig: Take 1 Cap by inhalation daily.

## 2018-10-19 DIAGNOSIS — I10 ESSENTIAL HYPERTENSION: ICD-10-CM

## 2018-10-19 RX ORDER — AMLODIPINE BESYLATE 5 MG/1
5 TABLET ORAL DAILY
Qty: 90 TAB | Refills: 3 | Status: SHIPPED | OUTPATIENT
Start: 2018-10-19 | End: 2018-10-29 | Stop reason: SDUPTHER

## 2018-10-19 NOTE — TELEPHONE ENCOUNTER
Last Visit: 08/14/2018 with MD Sumit Vargas    Next Appointment: 02/20/2019 with MD Sumit Vargas   Previous Refill Encounters: 04/24/2018 per MD Sumit Vargas #30 with 5 refills     Requested Prescriptions     Pending Prescriptions Disp Refills    amLODIPine (NORVASC) 5 mg tablet 90 Tab 3     Sig: Take 1 Tab by mouth daily.

## 2018-10-29 DIAGNOSIS — I10 ESSENTIAL HYPERTENSION: ICD-10-CM

## 2018-10-29 NOTE — TELEPHONE ENCOUNTER
Last Visit: 08/14/2018 with MD Rogelio Vaca    Next Appointment: 02/20/2019 with MD Rogelio Vaca     Requested Prescriptions     Pending Prescriptions Disp Refills    amLODIPine (NORVASC) 5 mg tablet 90 Tab 3     Sig: Take 1 Tab by mouth daily.

## 2018-10-30 RX ORDER — AMLODIPINE BESYLATE 5 MG/1
5 TABLET ORAL DAILY
Qty: 90 TAB | Refills: 3 | Status: SHIPPED | OUTPATIENT
Start: 2018-10-30 | End: 2019-02-20 | Stop reason: SDUPTHER

## 2019-02-13 ENCOUNTER — APPOINTMENT (OUTPATIENT)
Dept: INTERNAL MEDICINE CLINIC | Age: 79
End: 2019-02-13

## 2019-02-13 ENCOUNTER — HOSPITAL ENCOUNTER (OUTPATIENT)
Dept: LAB | Age: 79
Discharge: HOME | End: 2019-02-13
Payer: MEDICARE

## 2019-02-13 DIAGNOSIS — D47.2 MONOCLONAL GAMMOPATHY: ICD-10-CM

## 2019-02-13 LAB
ERYTHROCYTE [DISTWIDTH] IN BLOOD BY AUTOMATED COUNT: 14.8 % (ref 11.6–14.5)
HCT VFR BLD AUTO: 44.8 % (ref 35–45)
HGB BLD-MCNC: 14.5 G/DL (ref 12–16)
MCH RBC QN AUTO: 30 PG (ref 24–34)
MCHC RBC AUTO-ENTMCNC: 32.4 G/DL (ref 31–37)
MCV RBC AUTO: 92.6 FL (ref 74–97)
PLATELET # BLD AUTO: 259 K/UL (ref 135–420)
PMV BLD AUTO: 11.2 FL (ref 9.2–11.8)
RBC # BLD AUTO: 4.84 M/UL (ref 4.2–5.3)
WBC # BLD AUTO: 8.8 K/UL (ref 4.6–13.2)

## 2019-02-13 PROCEDURE — 85027 COMPLETE CBC AUTOMATED: CPT

## 2019-02-13 PROCEDURE — 36415 COLL VENOUS BLD VENIPUNCTURE: CPT

## 2019-02-17 NOTE — PROGRESS NOTES
67 y.o. WHITE OR  female who presents for f/u    Denies any cardiovascular complaints, no exercise but remains active with household chores. She forgot to take her norvasc today prior to coming    Using the spiriva daily and only using the rescue inhaler sparingly. Still smoking although 'down to 1 ppd', has not had the chantix filled    Denies any GI complaints off the ppi    No gu complaints    LAST MEDICARE WELLNESS EXAM: 4/30/14, 5/1/15, 5/19/16, 5/16/17, 4/24/18          Past Medical History:   Diagnosis Date    Allergic rhinitis     Colon adenoma     1/15 Dr Alice Machado; polyp 2006 Dr Jerald Atkinson COPD     on films    DJD (degenerative joint disease)     Dyslipidemia     FHx: heart disease     GERD (gastroesophageal reflux disease)     s/p dilation Dr. Radha Ly 2006; [facility] and esophagitis 1/15 Dr Alice Machado    Monoclonal gammopathy 2011 Dr. Neyda Tomlinson Osteoporosis Dr. Sarika Ayon, t score -0.6 spine, -2.7 hip     took forteo 2012 to 2014 Dr Herring    Recurrent BCC (basal cell carcinoma)     Dr. Shira Mcclure Tobacco dependence syndrome     UTI (urinary tract infection)      Past Surgical History:   Procedure Laterality Date    BREAST SURGERY PROCEDURE UNLISTED  1992    negative right breast biopsy Dr. Mary Ly 2006 polyp; Dr Alice Machado 1/15 adenoma    HX HYSTERECTOMY  1970s    for precancerous lesions in the cervix    HX ORTHOPAEDIC      DEXA t score 0.32 spine, -2.28 hip FRAX 5.3 hip Dr Sarika Ayon (12/13); spine 0.68, hip -2.51 w FRAX 7.2 (12/15)    HX ORTHOPAEDIC  05/2017    right CTS release and thumb surgery Dr Jai Escobar      s/p c spine surgery [address] 2010?     NEUROLOGICAL PROCEDURE UNLISTED  12/2017    MMSE 30/30     Social History     Socioeconomic History    Marital status:      Spouse name: Not on file    Number of children: 3    Years of education: Not on file    Highest education level: Not on file   Social Needs  Financial resource strain: Not on file   Eliel-Ileana insecurity - worry: Not on file    Food insecurity - inability: Not on file   [employer] needs - medical: Not on file   [employer] needs - non-medical: Not on file   Occupational History    Occupation: retired [employer]   Tobacco Use    Smoking status: Current Some Day Smoker     Packs/day: 1.00     Years: 50.00     Pack years: 50.00     Types: Cigarettes    Smokeless tobacco: Never Used   Substance and Sexual Activity    Alcohol use: No    Drug use: No    Sexual activity: Not on file   Other Topics Concern    Not on file   Social History Narrative    Not on file     Allergies   Allergen Reactions    Penicillins Rash and Hives       Current Outpatient Medications   Medication Sig    amLODIPine (NORVASC) 5 mg tablet Take 1 Tab by mouth daily.  atorvastatin (LIPITOR) 80 mg tablet Take 1 Tab by mouth daily.  ezetimibe (ZETIA) 10 mg tablet Take 1 Tab by mouth daily.  tiotropium (SPIRIVA WITH HANDIHALER) 18 mcg inhalation capsule Take 1 Cap by inhalation daily.  denosumab (PROLIA) 60 mg/mL injection 60 mg by SubCUTAneous route every 6 months.  CALCIUM CARBONATE (CALCIUM 500 PO) Take  by mouth daily.  cyanocobalamin 1,000 mcg tablet Take 1,000 mcg by mouth daily.  albuterol (PROVENTIL HFA, VENTOLIN HFA, PROAIR HFA) 90 mcg/actuation inhaler Take 1 Puff by inhalation every six (6) hours as needed for Wheezing.  fluticasone (FLONASE) 50 mcg/actuation nasal spray 2 Sprays by Both Nostrils route daily. (Patient taking differently: 2 Sprays by Both Nostrils route as needed.)    Cholecalciferol, Vitamin D3, (VITAMIN D3) 1,000 unit Cap Take 1,000 Units by mouth daily.  aspirin delayed-release 81 mg tablet Take 81 mg by mouth daily. No current facility-administered medications for this visit.       REVIEW OF SYSTEMS:  S/p hyst, mammo 11/15, DEXA 12/15 Dr Micaela Levin, colo 1/15 Dr Carolina Scott, sees Dr Prashant Colón and Dr Benedict Moran - no vision change or eye pain  Oral - no mouth pain, tongue or tooth problems  Ears - no hearing loss, ear pain, fullness, no swallowing problems  Cardiac - no CP, PND, orthopnea, edema, palpitations or syncope  Chest - no breast masses  Resp - no wheezing, chronic coughing, dyspnea  GI - no heartburn, nausea, vomiting, change in bowel habits, bleeding, hemorrhoids  Constitutional - no wt loss, night sweats, unexplained fevers  Neuro - no focal weakness, numbness, paresthesias, incoordination, ataxia, involuntary movements  Endo - no polyuria, polydipsia, nocturia, hot flashes    Visit Vitals  /82 (BP 1 Location: Left arm, BP Patient Position: Sitting)   Pulse 82   Temp 96.9 °F (36.1 °C) (Oral)   Resp 12   Ht 5' 1\" (1.549 m)   Wt 123 lb 3.2 oz (55.9 kg)   SpO2 97%   BMI 23.28 kg/m²     A&O x3  Affect is appropriate. Mood stable  No apparent distress  Anicteric, no JVD, adenopathy or thyromegaly. No carotid bruits or radiated murmur  Lungs clear to auscultation, no wheezes or rales  Heart showed regular rate and rhythm. No murmur, rubs, gallops  Abdomen soft nontender, no hepatosplenomegaly or masses. Extremities without edema. Pulses 1-2+ symmetrically.       LABS  From 5/11 showed   gluc 90,   cr 0.70,    alt 21,          chol 149, tg 100, hdl 29, ldl-c 100, wbc 9.8, hb 14.2, plt 276  From 10/11 showed gluc 100, cr 1.04, gfr 55  From 11/11 showed                   ldl-p 1685, chol 157, tg 116, hdl 41, ldl-c 93,   spep+  From 3/12 showed   gluc 91,   cr 0.84, gfr 70,  alt 17, ldl-p 1309, chol 126, tg 86,   hdl 37, ldl-c 72  From 8/12 showed   gluc 95,   cr 0.86, gfr 68,  alt 21  From 3/13 showed                   ldl-p 1281, chol 118, tg 93,   hdl 40, ldl-c 59  From 9/13 showed   gluc 92,   cr 0.82, gfr 72,  alt 19, ldl-p 1268, chol 136, tg 71,   hdl 42, ldl-c 80,   wbc 7.7, hb 13.4, plt 310, ua neg pro  From 3/14 showed   gluc 92,   cr 0.88, gfr>60, alt<5,          chol 122, tg 104, hdl 35, ldl-c 66, wbc 7.5, hb 12.7, plt 312, ua neg  From 10/14 showed               chol 140, tg 100, hdl 36, ldl-c 84  From 4/15 showed   gluc 94,   cr 0.96, gfr>60, alt 6,          wbc 9.3, hb 14.0, plt 313  From 11/15 showed               chol 144, tg 79,   hdl 44, ldl-c 84  From 5/16 showed   gluc 82,   cr 0.82, gfr>60, alt 26,          wbc 5.3, hb 14.6, plt 241  From 11/16 showed               chol 217, tg 165, hdl 47, ldl-c 137  From 5/17 showed   gluc 90,   cr 0.75, gfr>60, alt 29,          wbc 7.4, hb 14.7, plt 224  From 6/17 showed   gluc 86,   cr 0.86, gfr>60,           wbc 8.4, hb 15.4, plt 235  From 9/17 showed   gluc 80,   cr 0.81, gfr 71,  alt 18,          wbc 8.4, hb 15.1, plt 267, fe 113, %sat 37, b12 959, FERNY+ igg mono lambda light chain, vit d 41.5  From 12/17 showed               chol 109, tg 73,   hdl 49, ldl-c 45  From 8/18 showed   gluc 89,   cr 0.78, gfr>60,           chol 120, tg 80,   hdl 48, ldl-c 56    Results for orders placed or performed during the hospital encounter of 02/13/19   CBC W/O DIFF   Result Value Ref Range    WBC 8.8 4.6 - 13.2 K/uL    RBC 4.84 4.20 - 5.30 M/uL    HGB 14.5 12.0 - 16.0 g/dL    HCT 44.8 35.0 - 45.0 %    MCV 92.6 74.0 - 97.0 FL    MCH 30.0 24.0 - 34.0 PG    MCHC 32.4 31.0 - 37.0 g/dL    RDW 14.8 (H) 11.6 - 14.5 %    PLATELET 994 760 - 547 K/uL    MPV 11.2 9.2 - 11.8 FL     Patient Active Problem List   Diagnosis Code    Colon polyps Dr. Genia Lew 2006 K63.5    Osteoporosis Dr. Rasta Delgado M81.0    Monoclonal gammopathy 2011 Dr. Teodoro Craft D47.2    COPD on films J44.9    Dyslipidemia E78.5    Arthritis, degenerative M19.90    Gastroesophageal reflux disease without esophagitis K21.9    Advance directive discussed with patient Z71.89    Tobacco dependence syndrome F17.200    Primary hypertension I10     ASSESSMENT AND PLAN:  1. Dyslipidemia. Continue current regimen  2. GERD. Daily PPI and avoidance measures  3. DJD. Continue prn otc meds  4. Colon polyps. Fiber  5. COPD. Continue current. 6.  Osteoporosis. F/U Dr. Adam Rashid as sched  7. MGUS. F/U Dr. Ned Shaw  8. Allergies. F/U katty Oneal  9. Smoking cessation. Has chantix script at home and will fill when she's ready  10. HTN. Continue current regimen. RTC 9/19    Above conditions discussed at length and patient vocalized understanding.   All questions answered to patient satisfaction

## 2019-02-20 ENCOUNTER — OFFICE VISIT (OUTPATIENT)
Dept: INTERNAL MEDICINE CLINIC | Age: 79
End: 2019-02-20

## 2019-02-20 VITALS
RESPIRATION RATE: 12 BRPM | OXYGEN SATURATION: 97 % | WEIGHT: 123.2 LBS | BODY MASS INDEX: 23.26 KG/M2 | TEMPERATURE: 96.9 F | SYSTOLIC BLOOD PRESSURE: 138 MMHG | DIASTOLIC BLOOD PRESSURE: 82 MMHG | HEIGHT: 61 IN | HEART RATE: 82 BPM

## 2019-02-20 DIAGNOSIS — E78.5 DYSLIPIDEMIA: ICD-10-CM

## 2019-02-20 DIAGNOSIS — F17.200 TOBACCO DEPENDENCE SYNDROME: Primary | ICD-10-CM

## 2019-02-20 DIAGNOSIS — I10 PRIMARY HYPERTENSION: ICD-10-CM

## 2019-02-20 DIAGNOSIS — J41.0 SIMPLE CHRONIC BRONCHITIS (HCC): ICD-10-CM

## 2019-02-20 DIAGNOSIS — K63.5 HYPERPLASTIC COLONIC POLYP, UNSPECIFIED PART OF COLON: ICD-10-CM

## 2019-02-20 DIAGNOSIS — D47.2 MONOCLONAL GAMMOPATHY: ICD-10-CM

## 2019-02-20 DIAGNOSIS — K21.9 GASTROESOPHAGEAL REFLUX DISEASE WITHOUT ESOPHAGITIS: ICD-10-CM

## 2019-02-20 DIAGNOSIS — I10 ESSENTIAL HYPERTENSION: ICD-10-CM

## 2019-02-20 RX ORDER — ATORVASTATIN CALCIUM 80 MG/1
80 TABLET, FILM COATED ORAL DAILY
Qty: 90 TAB | Refills: 3 | Status: SHIPPED | OUTPATIENT
Start: 2019-02-20 | End: 2020-03-10 | Stop reason: SDUPTHER

## 2019-02-20 RX ORDER — AMLODIPINE BESYLATE 5 MG/1
5 TABLET ORAL DAILY
Qty: 90 TAB | Refills: 3 | Status: SHIPPED | OUTPATIENT
Start: 2019-02-20 | End: 2020-03-10 | Stop reason: SDUPTHER

## 2019-02-20 RX ORDER — EZETIMIBE 10 MG/1
10 TABLET ORAL DAILY
Qty: 90 TAB | Refills: 3 | Status: SHIPPED | OUTPATIENT
Start: 2019-02-20 | End: 2020-03-10 | Stop reason: SDUPTHER

## 2019-02-20 NOTE — PROGRESS NOTES
Chief Complaint   Patient presents with    Hypertension     6 month follow up    Labs     done 02-13-19      Patient states she forgot to take her blood pressure medication this morning. The patient usually takes her blood pressure medication between 6:00 and 7:00 AM.  The patient states she will go home and take her blood pressure medication. 1. Have you been to the ER, urgent care clinic since your last visit? Hospitalized since your last visit? No    2. Have you seen or consulted any other health care providers outside of the [facility] since your last visit? Include any pap smears or colon screening. No    Patient was given a copy of the Advanced Directive and understands to bring it in once completed.   Health Maintenance Due   Topic Date Due    Shingrix Vaccine Age 49> (1 of 2) 02/14/1991    Influenza Age 5 to Adult  08/01/2018    GLAUCOMA SCREENING Q2Y  11/28/2018

## 2019-02-20 NOTE — TELEPHONE ENCOUNTER
Last Visit: 2/20/19  Next Appt: 9/19/19  Previous Refill Encounter: 10/30 & 8/30/18    Requested Prescriptions     Pending Prescriptions Disp Refills    amLODIPine (NORVASC) 5 mg tablet 90 Tab 3     Sig: Take 1 Tab by mouth daily.  atorvastatin (LIPITOR) 80 mg tablet 90 Tab 3     Sig: Take 1 Tab by mouth daily.  ezetimibe (ZETIA) 10 mg tablet 90 Tab 3     Sig: Take 1 Tab by mouth daily.  tiotropium (SPIRIVA WITH HANDIHALER) 18 mcg inhalation capsule 90 Cap 3     Sig: Take 1 Cap by inhalation daily.

## 2019-02-20 NOTE — PATIENT INSTRUCTIONS
Patient was given a copy of the Advanced Medical Directive Form, and understands to bring it in once completed.   Health Maintenance Due   Topic Date Due    Shingrix Vaccine Age 49> (1 of 2) 02/14/1991    Influenza Age 5 to Adult  08/01/2018    GLAUCOMA SCREENING Q2Y  11/28/2018

## 2019-09-19 ENCOUNTER — APPOINTMENT (OUTPATIENT)
Dept: INTERNAL MEDICINE CLINIC | Age: 79
End: 2019-09-19

## 2019-09-19 ENCOUNTER — HOSPITAL ENCOUNTER (OUTPATIENT)
Dept: LAB | Age: 79
Discharge: HOME | End: 2019-09-19
Payer: MEDICARE

## 2019-09-19 DIAGNOSIS — E78.5 DYSLIPIDEMIA: ICD-10-CM

## 2019-09-19 LAB
ALBUMIN SERPL-MCNC: 3.8 G/DL (ref 3.4–5)
ALBUMIN/GLOB SERPL: 1.3 {RATIO} (ref 0.8–1.7)
ALP SERPL-CCNC: 84 U/L (ref 45–117)
ALT SERPL-CCNC: 23 U/L (ref 13–56)
ANION GAP SERPL CALC-SCNC: 7 MMOL/L (ref 3–18)
AST SERPL-CCNC: 15 U/L (ref 10–38)
BILIRUB SERPL-MCNC: 0.6 MG/DL (ref 0.2–1)
BUN SERPL-MCNC: 12 MG/DL (ref 7–18)
BUN/CREAT SERPL: 15 (ref 12–20)
CALCIUM SERPL-MCNC: 9.4 MG/DL (ref 8.5–10.1)
CHLORIDE SERPL-SCNC: 102 MMOL/L (ref 100–111)
CO2 SERPL-SCNC: 27 MMOL/L (ref 21–32)
CREAT SERPL-MCNC: 0.81 MG/DL (ref 0.6–1.3)
GLOBULIN SER CALC-MCNC: 3 G/DL (ref 2–4)
GLUCOSE SERPL-MCNC: 85 MG/DL (ref 74–99)
POTASSIUM SERPL-SCNC: 4.2 MMOL/L (ref 3.5–5.5)
PROT SERPL-MCNC: 6.8 G/DL (ref 6.4–8.2)
SODIUM SERPL-SCNC: 136 MMOL/L (ref 136–145)

## 2019-09-19 PROCEDURE — 36415 COLL VENOUS BLD VENIPUNCTURE: CPT

## 2019-09-19 PROCEDURE — 80061 LIPID PANEL: CPT

## 2019-09-19 PROCEDURE — 80053 COMPREHEN METABOLIC PANEL: CPT

## 2019-09-20 LAB
CHOLEST SERPL-MCNC: 136 MG/DL
HDLC SERPL-MCNC: 46 MG/DL (ref 40–60)
HDLC SERPL: 3 {RATIO} (ref 0–5)
LDLC SERPL CALC-MCNC: 71.4 MG/DL (ref 0–100)
LIPID PROFILE,FLP: NORMAL
TRIGL SERPL-MCNC: 93 MG/DL (ref ?–150)
VLDLC SERPL CALC-MCNC: 18.6 MG/DL

## 2019-09-22 NOTE — PROGRESS NOTES
This is a Subsequent Medicare Annual Wellness Visit     I have reviewed the patient's medical history in detail and updated the computerized patient record. History     Past Medical History:   Diagnosis Date    Allergic rhinitis     Colon adenoma     1/15 Dr Lnida Lamb; polyp 2006 Dr Good Wood COPD     on films    DJD (degenerative joint disease)     Dyslipidemia     FHx: heart disease     GERD (gastroesophageal reflux disease)     s/p dilation Dr. Alexa Solis 2006; [place] and esophagitis 1/15 Dr Linda Lamb    Monoclonal gammopathy 2011 Dr. Temple Huh Osteoporosis Dr. Barber Hastings, t score -0.6 spine, -2.7 hip     took forteo 2012 to 2014 Dr Herring    Recurrent BCC (basal cell carcinoma)     Dr. Grayson Servant dependence syndrome     UTI (urinary tract infection)       Past Surgical History:   Procedure Laterality Date    BREAST SURGERY PROCEDURE UNLISTED  1992    negative right breast biopsy Dr. Cathy Solis 2006 polyp; Dr Linda Lamb 1/15 adenoma    HX HYSTERECTOMY  1970s    for precancerous lesions in the cervix    HX ORTHOPAEDIC      DEXA t score 0.32 spine, -2.28 hip FRAX 5.3 hip Dr Barber Hastings (12/13); spine 0.68, hip -2.51 w FRAX 7.2 (12/15)    HX ORTHOPAEDIC  05/2017    right CTS release and thumb surgery Dr Phyllis Francis      s/p c spine surgery [facility] 2010?  NEUROLOGICAL PROCEDURE UNLISTED  12/2017    MMSE 30/30     Current Outpatient Medications   Medication Sig Dispense Refill    amLODIPine (NORVASC) 5 mg tablet Take 1 Tab by mouth daily. 90 Tab 3    atorvastatin (LIPITOR) 80 mg tablet Take 1 Tab by mouth daily. 90 Tab 3    ezetimibe (ZETIA) 10 mg tablet Take 1 Tab by mouth daily. 90 Tab 3    tiotropium (SPIRIVA WITH HANDIHALER) 18 mcg inhalation capsule Take 1 Cap by inhalation daily. 90 Cap 3    CALCIUM CARBONATE (CALCIUM 500 PO) Take  by mouth daily.  cyanocobalamin 1,000 mcg tablet Take 1,000 mcg by mouth daily.       albuterol (PROVENTIL HFA, VENTOLIN HFA, PROAIR HFA) 90 mcg/actuation inhaler Take 1 Puff by inhalation every six (6) hours as needed for Wheezing. 1 Inhaler 5    fluticasone (FLONASE) 50 mcg/actuation nasal spray 2 Sprays by Both Nostrils route daily. (Patient taking differently: 2 Sprays by Both Nostrils route as needed.) 3 Bottle 3    Cholecalciferol, Vitamin D3, (VITAMIN D3) 1,000 unit Cap Take 1,000 Units by mouth daily.  aspirin delayed-release 81 mg tablet Take 81 mg by mouth daily.  denosumab (PROLIA) 60 mg/mL injection 60 mg by SubCUTAneous route every 6 months. Allergies   Allergen Reactions    Penicillins Rash and Hives     Family History   Problem Relation Age of Onset    Heart Attack Father     Kidney Disease Mother         ADPKD    Diabetes Sister     Cancer Sister         leukemia     Social History     Tobacco Use    Smoking status: Current Some Day Smoker     Packs/day: 1.00     Years: 50.00     Pack years: 50.00     Types: Cigarettes    Smokeless tobacco: Never Used   Substance Use Topics    Alcohol use: No     Depression Risk Factor Screening:     3 most recent PHQ Screens 2/20/2019   Little interest or pleasure in doing things Not at all   Feeling down, depressed, irritable, or hopeless Not at all   Total Score PHQ 2 0     SCREENINGS  Colonoscopy last done 1/15 Dr Lynn Villegas last done 11/15 and declined further  DEXA last done 12/15 Dr Jin Barahona last done >5 yrs ago     Immunization History   Administered Date(s) Administered    (RETIRED) Pneumococcal Vaccine (Unspecified Type) 11/03/2006    Influenza High Dose Vaccine PF 12/01/2016, 12/22/2017    Influenza Vaccine 09/12/2013, 10/22/2014    Influenza Vaccine Split 12/02/2011    Influenza Vaccine Whole 12/18/2008    Pneumococcal Conjugate (PCV-13) 05/19/2016    Pneumococcal Polysaccharide (PPSV-23) 09/26/2019    TDAP Vaccine 05/25/2011    Zoster 01/01/2008     Alcohol Risk Factor Screening:    On any occasion during the past 3 months, have you had more than 3 drinks containing alcohol? No    Do you average more than 7 drinks per week? No      Functional Ability and Level of Safety:     Hearing Loss   Moderate Dr Delma Puckett - no acute complaints and is followed by [facility]    Activities of Daily Living   Self-care. Requires assistance with: no ADLs    Fall Risk     Fall Risk Assessment, last 12 mths 2/20/2019   Able to walk? Yes   Fall in past 12 months? No     Abuse Screen   Patient is not abused    Review of Systems   A comprehensive review of systems was negative except for that written in the HPI. Physical Examination     Visit Vitals  /76   Pulse 95   Temp 98 °F (36.7 °C) (Oral)   Resp 14   Ht 5' 1\" (1.549 m)   Wt 116 lb (52.6 kg)   SpO2 97%   BMI 21.92 kg/m²       Evaluation of Cognitive Function:  Mood/affect:  neutral  Appearance: age appropriate, well dressed and within normal Limits  Family member/caregiver input: na    Patient Care Team:  Mary Thibodeaux MD as PCP - General (Internal Medicine)  Marielena Meng RN as [address] [place] [address] (Internal Medicine)  Santi Herring MD (Rheumatology)    Advice/Referrals/Counseling   Education and counseling provided:  Are appropriate based on today's review and evaluation  End-of-Life planning (with patient's consent)  Pneumococcal Vaccine  Screening Mammography  Colorectal cancer screening tests  Cardiovascular screening blood test  Bone mass measurement (DEXA)  Diabetes screening test    Assessment/Plan       ICD-10-CM ICD-9-CM    1. Medicare annual wellness visit, subsequent Z00.00 V70.0    2. Encounter for immunization Z23 V03.89 PNEUMOCOCCAL POLYSACCHARIDE VACCINE, 23-VALENT, ADULT OR IMMUNOSUPPRESSED PT DOSE,      ADMIN PNEUMOCOCCAL VACCINE   3. Tobacco dependence syndrome F17.200 305.1    4. Primary hypertension I10 401.9    5. Age-related osteoporosis without current pathological fracture M81.0 733.01    6.  Monoclonal gammopathy 2011 Dr. Jorge Laws D47.2 273.1 CBC W/O DIFF   7. Gastroesophageal reflux disease without esophagitis K21.9 530.81    8. Dyslipidemia E78.5 272.4    9. Chronic obstructive pulmonary disease, unspecified COPD type ([facility] J44.9 496    10. Hyperplastic colonic polyp, unspecified part of colon K63.5 211.3    11. Advanced directives, counseling/discussion Z71.89 V65.49 ADVANCE CARE PLANNING FIRST 30 MINS   12. Screening for alcoholism Z13.39 V79.1 [place] ANNUAL ALCOHOL SCREEN 15 MIN   13. Screening for depression Z13.31 V79.0 [facility] 68   14. Screening for diabetes mellitus Z13.1 V77.1    15. Encounter for screening mammogram for malignant neoplasm of breast Z12.31 V76.12 [facility] MAMMO BI SCREENING INCL CAD     current treatment plan is effective  lab results and schedule of future lab studies reviewed with patient. End of life discussion undertaken.   Will work on getting amd in place  Colonoscopy beyond age  [de-identified] was ordered after pt reconsidered  DEXA per Dr Nahomy Kearney  [id number] given  shingrix advocated

## 2019-09-22 NOTE — PROGRESS NOTES
67 y.o. WHITE OR  female who presents for f/u    Denies any cardiovascular complaints, no exercise but remains active with household chores. Her daughter 'doesn't want her to go out and exercise'    Using the spiriva most days and only using the rescue inhaler rarely, nol wheezing or sob. Still smoking although 'down to 1 ppd', has not had the chantix filled    Denies any GI complaints off the ppi    No gu complaints    She's having intermittent left trapezius pain but no radicular sx down the arm    LAST MEDICARE WELLNESS EXAM: 4/30/14, 5/1/15, 5/19/16, 5/16/17, 9/26/19      Past Medical History:   Diagnosis Date    Allergic rhinitis     Colon adenoma     1/15 Dr Virginia Ambrocio; polyp 2006 Dr Rodrigo Ventura COPD     on films    DJD (degenerative joint disease)     Dyslipidemia     FHx: heart disease     GERD (gastroesophageal reflux disease)     s/p dilation Dr. Clare Valdez 2006; [facility] and esophagitis 1/15 Dr Virginia Ambrocio    Monoclonal gammopathy 2011 Dr. Ingris Chen Osteoporosis Dr. Jalil Solis, t score -0.6 spine, -2.7 hip     took forteo 2012 to 2014 Dr Herring    Recurrent BCC (basal cell carcinoma)     Dr. Mancia Boeharitha dependence syndrome     UTI (urinary tract infection)      Past Surgical History:   Procedure Laterality Date    BREAST SURGERY PROCEDURE UNLISTED  1992    negative right breast biopsy Dr. Adelaide Valdez 2006 polyp; Dr Virginia Ambrocio 1/15 adenoma    HX HYSTERECTOMY  1970s    for precancerous lesions in the cervix    HX ORTHOPAEDIC      DEXA t score 0.32 spine, -2.28 hip FRAX 5.3 hip Dr Jalil Solis (12/13); spine 0.68, hip -2.51 w FRAX 7.2 (12/15)    HX ORTHOPAEDIC  05/2017    right CTS release and thumb surgery Dr Maria Hummel      s/p c spine surgery [facility] 2010?     NEUROLOGICAL PROCEDURE UNLISTED  12/2017    MMSE 30/30     Social History     Socioeconomic History    Marital status:      Spouse name: Not on file    Number of children: 3    Years of education: Not on file    Highest education level: Not on file   Occupational History    Occupation: retired [employer]   Social Needs    Financial resource strain: Not on file    Food insecurity:     Worry: Not on file     Inability: Not on file   [place] needs:     Medical: Not on file     Non-medical: Not on file   Tobacco Use    Smoking status: Current Some Day Smoker     Packs/day: 1.00     Years: 50.00     Pack years: 50.00     Types: Cigarettes    Smokeless tobacco: Never Used   Substance and Sexual Activity    Alcohol use: No    Drug use: No    Sexual activity: Not on file   Lifestyle    Physical activity:     Days per week: Not on file     Minutes per session: Not on file    Stress: Not on file   Relationships    Social connections:     Talks on phone: Not on file     Gets together: Not on file     Attends Religion service: Not on file     Active member of club or organization: Not on file     Attends meetings of clubs or organizations: Not on file     Relationship status: Not on file    Intimate partner violence:     Fear of current or ex partner: Not on file     Emotionally abused: Not on file     Physically abused: Not on file     Forced sexual activity: Not on file   Other Topics Concern    Not on file   Social History Narrative    Not on file     Allergies   Allergen Reactions    Penicillins Rash and Hives       Current Outpatient Medications   Medication Sig    amLODIPine (NORVASC) 5 mg tablet Take 1 Tab by mouth daily.  atorvastatin (LIPITOR) 80 mg tablet Take 1 Tab by mouth daily.  ezetimibe (ZETIA) 10 mg tablet Take 1 Tab by mouth daily.  tiotropium (SPIRIVA WITH HANDIHALER) 18 mcg inhalation capsule Take 1 Cap by inhalation daily.  CALCIUM CARBONATE (CALCIUM 500 PO) Take  by mouth daily.  cyanocobalamin 1,000 mcg tablet Take 1,000 mcg by mouth daily.     albuterol (PROVENTIL HFA, VENTOLIN HFA, PROAIR HFA) 90 mcg/actuation inhaler Take 1 Puff by inhalation every six (6) hours as needed for Wheezing.  fluticasone (FLONASE) 50 mcg/actuation nasal spray 2 Sprays by Both Nostrils route daily. (Patient taking differently: 2 Sprays by Both Nostrils route as needed.)    Cholecalciferol, Vitamin D3, (VITAMIN D3) 1,000 unit Cap Take 1,000 Units by mouth daily.  aspirin delayed-release 81 mg tablet Take 81 mg by mouth daily.  denosumab (PROLIA) 60 mg/mL injection 60 mg by SubCUTAneous route every 6 months. No current facility-administered medications for this visit. REVIEW OF SYSTEMS:  S/p hyst, mammo 11/15, DEXA 12/15 Dr Dejon Fuller, colo 1/15 Dr Chivo Gill, sees Dr Melita Beltran and Dr Екатерина Ivan  Ophtho - no vision change or eye pain  Oral - no mouth pain, tongue or tooth problems  Ears - no hearing loss, ear pain, fullness, no swallowing problems  Cardiac - no CP, PND, orthopnea, edema, palpitations or syncope  Chest - no breast masses  Resp - no wheezing, chronic coughing, dyspnea  GI - no heartburn, nausea, vomiting, change in bowel habits, bleeding, hemorrhoids  Urinary - no dysuria, hematuria, flank pain, urgency, frequency    Visit Vitals  /76   Pulse 95   Temp 98 °F (36.7 °C) (Oral)   Resp 14   Ht 5' 1\" (1.549 m)   Wt 116 lb (52.6 kg)   SpO2 97%   BMI 21.92 kg/m²     A&O x3  Affect is appropriate. Mood stable  No apparent distress  Anicteric, no JVD, adenopathy or thyromegaly. No carotid bruits or radiated murmur  Lungs clear to auscultation, no wheezes or rales  Heart showed regular rate and rhythm. No murmur, rubs, gallops  Abdomen soft nontender, no hepatosplenomegaly or masses. Extremities without edema. Pulses 1-2+ symmetrically.       LABS  From 5/11 showed   gluc 90,   cr 0.70,    alt 21,          chol 149, tg 100, hdl 29, ldl-c 100, wbc 9.8, hb 14.2, plt 276  From 10/11 showed gluc 100, cr 1.04, gfr 55  From 11/11 showed                   ldl-p 1685, chol 157, tg 116, hdl 41, ldl-c 93,   spep+  From 3/12 showed   gluc 91,   cr 0.84, gfr 70,  alt 17, ldl-p 1309, chol 126, tg 86,   hdl 37, ldl-c 72  From 8/12 showed   gluc 95,   cr 0.86, gfr 68,  alt 21  From 3/13 showed                   ldl-p 1281, chol 118, tg 93,   hdl 40, ldl-c 59  From 9/13 showed   gluc 92,   cr 0.82, gfr 72,  alt 19, ldl-p 1268, chol 136, tg 71,   hdl 42, ldl-c 80,   wbc 7.7, hb 13.4, plt 310, ua neg pro  From 3/14 showed   gluc 92,   cr 0.88, gfr>60, alt<5,          chol 122, tg 104, hdl 35, ldl-c 66,   wbc 7.5, hb 12.7, plt 312, ua neg  From 10/14 showed               chol 140, tg 100, hdl 36, ldl-c 84  From 4/15 showed   gluc 94,   cr 0.96, gfr>60, alt 6,          wbc 9.3, hb 14.0, plt 313  From 11/15 showed               chol 144, tg 79,   hdl 44, ldl-c 84  From 5/16 showed   gluc 82,   cr 0.82, gfr>60, alt 26,          wbc 5.3, hb 14.6, plt 241  From 11/16 showed               chol 217, tg 165, hdl 47, ldl-c 137  From 5/17 showed   gluc 90,   cr 0.75, gfr>60, alt 29,          wbc 7.4, hb 14.7, plt 224  From 6/17 showed   gluc 86,   cr 0.86, gfr>60,           wbc 8.4, hb 15.4, plt 235  From 9/17 showed   gluc 80,   cr 0.81, gfr 71,  alt 18,          wbc 8.4, hb 15.1, plt 267, fe 113, %sat 37, b12 959, FERNY+ igg mono lambda light chain, vit d 41.5  From 12/17 showed               chol 109, tg 73,   hdl 49, ldl-c 45  From 8/18 showed   gluc 89,   cr 0.78, gfr>60,           chol 120, tg 80,   hdl 48, ldl-c 56  From 4/19 showed                  wbc 8.8, hb 14.5, plt 259    Results for orders placed or performed during the hospital encounter of 09/19/19   LIPID PANEL   Result Value Ref Range    LIPID PROFILE          Cholesterol, total 136 <200 MG/DL    Triglyceride 93 <150 MG/DL    HDL Cholesterol 46 40 - 60 MG/DL    LDL, calculated 71.4 0 - 100 MG/DL    VLDL, calculated 18.6 MG/DL    CHOL/HDL Ratio 3.0 0 - 5.0     METABOLIC PANEL, COMPREHENSIVE   Result Value Ref Range    Sodium 136 136 - 145 mmol/L    Potassium 4.2 3.5 - 5.5 mmol/L    Chloride 102 100 - 111 mmol/L    CO2 27 21 - 32 mmol/L    Anion gap 7 3.0 - 18 mmol/L    Glucose 85 74 - 99 mg/dL    BUN 12 7.0 - 18 MG/DL    Creatinine 0.81 0.6 - 1.3 MG/DL    BUN/Creatinine ratio 15 12 - 20      GFR est AA >60 >60 ml/min/1.73m2    GFR est non-AA >60 >60 ml/min/1.73m2    Calcium 9.4 8.5 - 10.1 MG/DL    Bilirubin, total 0.6 0.2 - 1.0 MG/DL    ALT (SGPT) 23 13 - 56 U/L    AST (SGOT) 15 10 - 38 U/L    Alk. phosphatase 84 45 - 117 U/L    Protein, total 6.8 6.4 - 8.2 g/dL    Albumin 3.8 3.4 - 5.0 g/dL    Globulin 3.0 2.0 - 4.0 g/dL    A-G Ratio 1.3 0.8 - 1.7       Patient Active Problem List   Diagnosis Code    Colon polyps Dr. Laura Mattson 2006 K63.5    Osteoporosis Dr. Wesley Francois M81.0    Monoclonal gammopathy 2011 Dr. Draper Salvage D47.2    COPD on films J44.9    Dyslipidemia E78.5    Arthritis, degenerative M19.90    Gastroesophageal reflux disease without esophagitis K21.9    Advance directive discussed with patient Z71.89    Tobacco dependence syndrome F17.200    Primary hypertension I10     ASSESSMENT AND PLAN:  1. Dyslipidemia. Continue current regimen  2. GERD. Daily PPI and avoidance measures  3. DJD. Continue prn otc meds  4. Colon polyps. Fiber  5. COPD. Continue current. 6.  Osteoporosis. F/U Dr. Wesley Francois as sched  7. MGUS. F/U Dr. Perfecto Stapleton  8. Allergies. F/U katty Fajardo  9. Smoking cessation. Has chantix script at home and will fill when she's ready  10. HTN. Continue current regimen. RTC 3/20    Above conditions discussed at length and patient vocalized understanding.   All questions answered to patient satisfaction

## 2019-09-26 ENCOUNTER — OFFICE VISIT (OUTPATIENT)
Dept: INTERNAL MEDICINE CLINIC | Age: 79
End: 2019-09-26

## 2019-09-26 VITALS
BODY MASS INDEX: 21.9 KG/M2 | TEMPERATURE: 98 F | SYSTOLIC BLOOD PRESSURE: 120 MMHG | DIASTOLIC BLOOD PRESSURE: 76 MMHG | HEART RATE: 95 BPM | RESPIRATION RATE: 14 BRPM | WEIGHT: 116 LBS | HEIGHT: 61 IN | OXYGEN SATURATION: 97 %

## 2019-09-26 DIAGNOSIS — I10 PRIMARY HYPERTENSION: ICD-10-CM

## 2019-09-26 DIAGNOSIS — D47.2 MONOCLONAL GAMMOPATHY: ICD-10-CM

## 2019-09-26 DIAGNOSIS — M81.0 AGE-RELATED OSTEOPOROSIS WITHOUT CURRENT PATHOLOGICAL FRACTURE: ICD-10-CM

## 2019-09-26 DIAGNOSIS — Z71.89 ADVANCED DIRECTIVES, COUNSELING/DISCUSSION: ICD-10-CM

## 2019-09-26 DIAGNOSIS — K21.9 GASTROESOPHAGEAL REFLUX DISEASE WITHOUT ESOPHAGITIS: ICD-10-CM

## 2019-09-26 DIAGNOSIS — Z12.31 ENCOUNTER FOR SCREENING MAMMOGRAM FOR MALIGNANT NEOPLASM OF BREAST: ICD-10-CM

## 2019-09-26 DIAGNOSIS — F17.200 TOBACCO DEPENDENCE SYNDROME: ICD-10-CM

## 2019-09-26 DIAGNOSIS — K63.5 HYPERPLASTIC COLONIC POLYP, UNSPECIFIED PART OF COLON: ICD-10-CM

## 2019-09-26 DIAGNOSIS — E78.5 DYSLIPIDEMIA: ICD-10-CM

## 2019-09-26 DIAGNOSIS — Z00.00 MEDICARE ANNUAL WELLNESS VISIT, SUBSEQUENT: Primary | ICD-10-CM

## 2019-09-26 DIAGNOSIS — Z13.39 SCREENING FOR ALCOHOLISM: ICD-10-CM

## 2019-09-26 DIAGNOSIS — Z13.31 SCREENING FOR DEPRESSION: ICD-10-CM

## 2019-09-26 DIAGNOSIS — Z23 ENCOUNTER FOR IMMUNIZATION: ICD-10-CM

## 2019-09-26 DIAGNOSIS — Z13.1 SCREENING FOR DIABETES MELLITUS: ICD-10-CM

## 2019-09-26 DIAGNOSIS — J44.9 CHRONIC OBSTRUCTIVE PULMONARY DISEASE, UNSPECIFIED COPD TYPE (HCC): ICD-10-CM

## 2019-09-26 NOTE — PROGRESS NOTES
Lonny Hayes presents today for   Chief Complaint   Patient presents with   [facility] Annual Wellness Visit    Cholesterol Problem     follow up with labs              Depression Screening:  3 most recent PHQ Screens 2/20/2019   Little interest or pleasure in doing things Not at all   Feeling down, depressed, irritable, or hopeless Not at all   Total Score PHQ 2 0       Learning Assessment:  Learning Assessment 2/20/2019   PRIMARY LEARNER Patient   HIGHEST LEVEL OF EDUCATION - PRIMARY LEARNER  DID NOT GRADUATE [facility] PRIMARY LEARNER NONE   CO-LEARNER CAREGIVER No   PRIMARY LANGUAGE ENGLISH   LEARNER PREFERENCE PRIMARY DEMONSTRATION     -     -   ANSWERED BY patient   RELATIONSHIP SELF       Abuse Screening:  Abuse Screening Questionnaire 2/20/2019   Do you ever feel afraid of your partner? N   Are you in a relationship with someone who physically or mentally threatens you? N   Is it safe for you to go home? Y       Fall Risk  Fall Risk Assessment, last 12 mths 2/20/2019   Able to walk? Yes   Fall in past 12 months? No           Coordination of Care:  1. Have you been to the ER, urgent care clinic since your last visit? Hospitalized since your last visit? no    2. Have you seen or consulted any other health care providers outside of the [address] [place] [address] since your last visit? Include any pap smears or colon screening.  No

## 2019-09-26 NOTE — PROGRESS NOTES
Fransisco Campos is a 67 y.o. female who presents for routine immunizations. Per verbal order from [address] [place] for Pneumovax 23 in left deltoid. She denies any symptoms , reactions or allergies that would exclude them from being immunized today. Risks and adverse reactions were discussed and the VIS was given to them. All questions were addressed. She was observed for 15 min post injection. There were no reactions observed.     Rolan Blancas LPN

## 2019-09-26 NOTE — PATIENT INSTRUCTIONS
Vaccine Information Statement    Influenza (Flu) Vaccine (Inactivated or Recombinant): What You Need to Know    Many Vaccine Information Statements are available in Estonian and other languages. See www.immunize.org/vis  Hojas de información sobre vacunas están disponibles en español y en muchos otros idiomas. Visite www.immunize.org/vis    1. Why get vaccinated? Influenza vaccine can prevent influenza (flu). Flu is a contagious disease that spreads around the [facility] every year, usually between October and May. Anyone can get the flu, but it is more dangerous for some people. Infants and young children, people 72years of age and older, pregnant women, and people with certain health conditions or a weakened immune system are at greatest risk of flu complications. Pneumonia, bronchitis, sinus infections and ear infections are examples of flu-related complications. If you have a medical condition, such as heart disease, cancer or diabetes, flu can make it worse. Flu can cause fever and chills, sore throat, muscle aches, fatigue, cough, headache, and runny or stuffy nose. Some people may have vomiting and diarrhea, though this is more common in children than adults. Each year thousands of people in the [facility] die from flu, and many more are hospitalized. Flu vaccine prevents millions of illnesses and flu-related visits to the doctor each year. 2. Influenza vaccines     CDC recommends everyone 10months of age and older get vaccinated every flu season. Children 6 months through 6years of age may need 2 doses during a single flu season. Everyone else needs only 1 dose each flu season. It takes about 2 weeks for protection to develop after vaccination. There are many flu viruses, and they are always changing. Each year a new flu vaccine is made to protect against three or four viruses that are likely to cause disease in the upcoming flu season.  Even when the vaccine doesnt exactly match these viruses, it may still provide some protection. Influenza vaccine does not cause flu. Influenza vaccine may be given at the same time as other vaccines. 3. Talk with your health care provider    Tell your vaccine provider if the person getting the vaccine:   Has had an allergic reaction after a previous dose of influenza vaccine, or has any severe, life-threatening allergies.  Has ever had Guillain-Barré Syndrome (also called GBS). In some cases, your health care provider may decide to postpone influenza vaccination to a future visit. People with minor illnesses, such as a cold, may be vaccinated. People who are moderately or severely ill should usually wait until they recover before getting influenza vaccine. Your health care provider can give you more information. 4. Risks of a reaction     Soreness, redness, and swelling where shot is given, fever, muscle aches, and headache can happen after influenza vaccine.  There may be a very small increased risk of Guillain-Barré Syndrome (GBS) after inactivated influenza vaccine (the flu shot). [place] children who get the flu shot along with pneumococcal vaccine (PCV13), and/or DTaP vaccine at the same time might be slightly more likely to have a seizure caused by fever. Tell your health care provider if a child who is getting flu vaccine has ever had a seizure. People sometimes faint after medical procedures, including vaccination. Tell your provider if you feel dizzy or have vision changes or ringing in the ears. As with any medicine, there is a very remote chance of a vaccine causing a severe allergic reaction, other serious injury, or death. 5. What if there is a serious problem? An allergic reaction could occur after the vaccinated person leaves the clinic.  If you see signs of a severe allergic reaction (hives, swelling of the face and throat, difficulty breathing, a fast heartbeat, dizziness, or weakness), call 9-1-1 and get the person to the nearest hospital.    For other signs that concern you, call your health care provider. Adverse reactions should be reported to the Vaccine Adverse Event Reporting System (VAERS). Your health care provider will usually file this report, or you can do it yourself. Visit the VAERS website at www.vaers. [facility].gov or call 0-854.485.9047. VAERS is only for reporting reactions, and VAERS staff do not give medical advice. 6. The National Vaccine Injury Compensation Program    The [facility] Vaccine Injury Compensation Program (VICP) is a federal program that was created to compensate people who may have been injured by certain vaccines. Visit the VICP website at www.[facility].gov/vaccinecompensation or call 6-621.946.8873 to learn about the program and about filing a claim. There is a time limit to file a claim for compensation. 7. How can I learn more?  Ask your health care provider.  Call your local or state health department.  Contact the Centers for Disease Control and Prevention (CDC):  - Call 5-488.444.1178 (1-800-CDC-INFO) or  - Visit CDCs influenza website at www.cdc.gov/flu    Vaccine Information Statement (Interim)  Inactivated Influenza Vaccine   8/15/2019  42 AMANDA Bateman [id number]   Department of Health and Human Services  Centers for Disease Control and Prevention    Office Use Only      Vaccine Information Statement    Pneumococcal Polysaccharide Vaccine: What You Need to Know    Many Vaccine Information Statements are available in Yakut and other languages. See www.immunize.org/vis. Hojas de información Sobre Vacunas están disponibles en español y en muchos otros idiomas. Visite [facility].si. 1. Why get vaccinated? Vaccination can protect older adults (and some children and younger adults) from pneumococcal disease. Pneumococcal disease is caused by bacteria that can spread from person to person through close contact.   It can cause ear infections, and it can also lead to more serious infections of the:   Lungs (pneumonia),   Blood (bacteremia), and   Covering of the brain and spinal cord (meningitis). Meningitis can cause deafness and brain damage, and it can be fatal.      Anyone can get pneumococcal disease, but children under 3years of age, people with certain medical conditions, adults over 72years of age, and cigarette smokers are at the highest risk. About 18,000 older adults die each year from pneumococcal disease in the [place]. Treatment of pneumococcal infections with penicillin and other drugs used to be more effective. But some strains of the disease have become resistant to these drugs. This makes prevention of the disease, through vaccination, even more important. 2. Pneumococcal polysaccharide vaccine (PPSV23)    Pneumococcal polysaccharide vaccine (PPSV23) protects against 23 types of pneumococcal bacteria. It will not prevent all pneumococcal disease. PPSV23 is recommended for:   All adults 72years of age and older,   Anyone 2 through 59years of age with certain long-term health problems,   Anyone 2 through 59years of age with a weakened immune system,   Adults 23 through 59years of age who smoke cigarettes or have asthma. Most people need only one dose of PPSV. A second dose is recommended for certain high-risk groups. People 72 and older should get a dose even if they have gotten one or more doses of the vaccine before they turned 65. Your healthcare provider can give you more information about these recommendations. Most healthy adults develop protection within 2 to 3 weeks of getting the shot. 3. Some people should not get this vaccine     Anyone who has had a life-threatening allergic reaction to PPSV should not get another dose.  Anyone who has a severe allergy to any component of PPSV should not receive it. Tell your provider if you have any severe allergies.      Anyone who is moderately or severely ill when the shot is scheduled may be asked to wait until they recover before getting the vaccine. Someone with a mild illness can usually be vaccinated.  Children less than 3years of age should not receive this vaccine.  There is no evidence that PPSV is harmful to either a pregnant woman or to her fetus. However, as a precaution, women who need the vaccine should be vaccinated before becoming pregnant, if possible. 4. Risks of a vaccine reaction    With any medicine, including vaccines, there is a chance of side effects. These are usually mild and go away on their own, but serious reactions are also possible. About half of people who get PPSV have mild side effects, such as redness or pain where the shot is given, which go away within about two days. Less than 1 out of 100 people develop a fever, muscle aches, or more severe local reactions. Problems that could happen after any vaccine:     People sometimes faint after a medical procedure, including vaccination. Sitting or lying down for about 15 minutes can help prevent fainting, and injuries caused by a fall. Tell your doctor if you feel dizzy, or have vision changes or ringing in the ears.  Some people get severe pain in the shoulder and have difficulty moving the arm where a shot was given. This happens very rarely.  Any medication can cause a severe allergic reaction. Such reactions from a vaccine are very rare, estimated at about 1 in a million doses, and would happen within a few minutes to a few hours after the vaccination. As with any medicine, there is a very remote chance of a vaccine causing a serious injury or death. The safety of vaccines is always being monitored. For more information, visit: www.cdc.gov/vaccinesafety/     5. What if there is a serious reaction? What should I look for?     Look for anything that concerns you, such as signs of a severe allergic reaction, very high fever, or unusual behavior. Signs of a severe allergic reaction can include hives, swelling of the face and throat, difficulty breathing, a fast heartbeat, dizziness, and weakness. These would usually start a few minutes to a few hours after the vaccination. What should I do? If you think it is a severe allergic reaction or other emergency that cant wait, call 9-1-1 or get to the nearest hospital. Otherwise, call your doctor. Afterward, the reaction should be reported to the Vaccine Adverse Event Reporting System (VAERS). Your doctor might file this report, or you can do it yourself through the VAERS web site at www.vaers. [facility].gov, or by calling 0-812.625.7701. VAERS does not give medical advice. 6. How can I learn more?  Ask your doctor. He or she can give you the vaccine package insert or suggest other sources of information.  Call your local or state health department.  Contact the Centers for Disease Control and Prevention (CDC):  - Call 9-647.459.5319 (0-726-MBT-INFO) or  - Visit CDCs website at [address] 18 04/24/2015    [facility] for Disease Control and Prevention    Office Use Only    Medicare Wellness Visit, Female     The best way to live healthy is to have a lifestyle where you eat a well-balanced diet, exercise regularly, limit alcohol use, and quit all forms of tobacco/nicotine, if applicable. Regular preventive services are another way to keep healthy. Preventive services (vaccines, screening tests, monitoring & exams) can help personalize your care plan, which helps you manage your own care. Screening tests can find health problems at the earliest stages, when they are easiest to treat. [facility] follows the current, evidence-based guidelines published by the [place] Jayme Garth (USPSTF) when recommending preventive services for our patients.  Because we follow these guidelines, sometimes recommendations change over time as research supports it. (For example, mammograms used to be recommended annually. Even though Medicare will still pay for an annual mammogram, the newer guidelines recommend a mammogram every two years for women of average risk.)  Of course, you and your doctor may decide to screen more often for some diseases, based on your risk and your health status. Preventive services for you include:  - Medicare offers their members a free annual wellness visit, which is time for you and your primary care provider to discuss and plan for your preventive service needs. Take advantage of this benefit every year!  -All adults over the age of 72 should receive the recommended pneumonia vaccines. Current USPSTF guidelines recommend a series of two vaccines for the best pneumonia protection.   -All adults should have a flu vaccine yearly and a tetanus vaccine every 10 years. All adults age 61 and older should receive a shingles vaccine once in their lifetime.    -A bone mass density test is recommended when a woman turns 65 to screen for osteoporosis. This test is only recommended one time, as a screening. Some providers will use this same test as a disease monitoring tool if you already have osteoporosis. -All adults age 38-68 who are overweight should have a diabetes screening test once every three years.   -Other screening tests and preventive services for persons with diabetes include: an eye exam to screen for diabetic retinopathy, a kidney function test, a foot exam, and stricter control over your cholesterol.   -Cardiovascular screening for adults with routine risk involves an electrocardiogram (ECG) at intervals determined by your doctor.   -Colorectal cancer screenings should be done for adults age 54-65 with no increased risk factors for colorectal cancer. There are a number of acceptable methods of screening for this type of cancer.  Each test has its own benefits and drawbacks. Discuss with your doctor what is most appropriate for you during your annual wellness visit. The different tests include: colonoscopy (considered the best screening method), a fecal occult blood test, a fecal DNA test, and sigmoidoscopy. -Breast cancer screenings are recommended every other year for women of normal risk, age 54-69.  -Cervical cancer screenings for women over age 72 are only recommended with certain risk factors.   -All adults born between [facility] should be screened once for Hepatitis C.      Here is a list of your current Health Maintenance items (your personalized list of preventive services) with a due date:  Health Maintenance Due   Topic Date Due    Shingles Vaccine (1 of 2) 02/14/1991    Glaucoma Screening   11/28/2018    Colonoscopy  01/14/2020

## 2019-09-26 NOTE — ACP (ADVANCE CARE PLANNING)
Advance Care Planning    Advance Care Planning (ACP) Provider Note - Comprehensive     Date of ACP Conversation: 09/26/19  Persons included in Conversation:  patient  Length of ACP Conversation in minutes:  16 minutes    Authorized Decision Maker (if patient is incapable of making informed decisions): This person is: laura Palencia  Other Legally Authorized Decision Maker (e.g. Next of Kin)          General ACP for ALL Patients with Decision Making Capacity:   Importance of advance care planning, including choosing a healthcare agent to communicate patient's healthcare decisions if patient lost the ability to make decisions, such as after a sudden illness or accident  Understanding of the healthcare agent role was assessed and information provided  Exploration of values, goals, and preferences if recovery is not expected, even with continued medical treatment in the event of: Imminent death  Severe, permanent brain injury    Review of Existing Advance Directive:  Has forms at home and will work on them    For Serious or Chronic Illness:  Understanding of medical condition    Understanding of CPR, goals and expected outcomes, benefits and burdens discussed.     Interventions Provided:  Recommended completion of Advance Directive form after review of ACP materials and conversation with prospective healthcare agent   Recommended communicating the plan and making copies for the healthcare agent, personal physician, and others as appropriate (e.g., health system)  Recommended review of completed ACP document annually or upon change in health status

## 2020-01-31 NOTE — ACP (ADVANCE CARE PLANNING)
Advance Care Planning    Advance Care Planning (ACP) Provider Note - Comprehensive     Date of ACP Conversation: 05/16/17  Persons included in Conversation:  patient  Length of ACP Conversation in minutes:  16 minutes    Authorized Decision Maker (if patient is incapable of making informed decisions): This person is: daughter  Other Legally Authorized Decision Maker (e.g. Next of Kin)          General ACP for ALL Patients with Decision Making Capacity:   Importance of advance care planning, including choosing a healthcare agent to communicate patient's healthcare decisions if patient lost the ability to make decisions, such as after a sudden illness or accident  Understanding of the healthcare agent role was assessed and information provided  Exploration of values, goals, and preferences if recovery is not expected, even with continued medical treatment in the event of: Imminent death  Severe, permanent brain injury    Review of Existing Advance Directive:  She reports that amd is in place and no changes planned, will bring in copy    For Serious or Chronic Illness:  Understanding of medical condition    Understanding of CPR, goals and expected outcomes, benefits and burdens discussed.     Interventions Provided:  Recommended completion of Advance Directive form after review of ACP materials and conversation with prospective healthcare agent   Recommended communicating the plan and making copies for the healthcare agent, personal physician, and others as appropriate (e.g., health system)  Recommended review of completed ACP document annually or upon change in health status
boy bring in copy of AMD.  Daughter is POA
4

## 2020-03-04 ENCOUNTER — HOSPITAL ENCOUNTER (OUTPATIENT)
Dept: MAMMOGRAPHY | Age: 80
Discharge: HOME | End: 2020-03-04
Attending: INTERNAL MEDICINE
Payer: MEDICARE

## 2020-03-04 DIAGNOSIS — Z12.31 ENCOUNTER FOR SCREENING MAMMOGRAM FOR MALIGNANT NEOPLASM OF BREAST: ICD-10-CM

## 2020-03-04 PROCEDURE — 77063 BREAST TOMOSYNTHESIS BI: CPT

## 2020-03-05 ENCOUNTER — TELEPHONE (OUTPATIENT)
Dept: INTERNAL MEDICINE CLINIC | Age: 80
End: 2020-03-05

## 2020-03-05 DIAGNOSIS — R92.8 ABNORMAL MAMMOGRAM: Primary | ICD-10-CM

## 2020-03-10 ENCOUNTER — NURSE NAVIGATOR (OUTPATIENT)
Dept: MAMMOGRAPHY | Age: 80
End: 2020-03-10

## 2020-03-10 ENCOUNTER — HOSPITAL ENCOUNTER (OUTPATIENT)
Dept: MAMMOGRAPHY | Age: 80
Discharge: HOME | End: 2020-03-10
Attending: INTERNAL MEDICINE
Payer: MEDICARE

## 2020-03-10 ENCOUNTER — HOSPITAL ENCOUNTER (OUTPATIENT)
Dept: ULTRASOUND IMAGING | Age: 80
Discharge: HOME | End: 2020-03-10
Attending: INTERNAL MEDICINE
Payer: MEDICARE

## 2020-03-10 DIAGNOSIS — R92.8 ABNORMAL MAMMOGRAM: ICD-10-CM

## 2020-03-10 DIAGNOSIS — J41.0 SIMPLE CHRONIC BRONCHITIS (HCC): ICD-10-CM

## 2020-03-10 DIAGNOSIS — E78.5 DYSLIPIDEMIA: ICD-10-CM

## 2020-03-10 DIAGNOSIS — I10 ESSENTIAL HYPERTENSION: ICD-10-CM

## 2020-03-10 DIAGNOSIS — N63.0 BREAST MASS: ICD-10-CM

## 2020-03-10 PROCEDURE — 77061 BREAST TOMOSYNTHESIS UNI: CPT

## 2020-03-10 PROCEDURE — 76642 ULTRASOUND BREAST LIMITED: CPT

## 2020-03-10 NOTE — TELEPHONE ENCOUNTER
Last visit 09/26/2019 MD Brown Gurrola   Next appointment 03/31/2020 MD Brown Gurrola   Previous refill encounter(s) 02/20/2019 Spriva #3 with 3 refills, 02/20/2019 Zetia #90 with 3 refills, 02/20/2019 Lipitor #90 with 3 refills, 02/20/2019 Norvasc #90 with 3 refills    Requested Prescriptions     Pending Prescriptions Disp Refills    amLODIPine (NORVASC) 5 mg tablet 90 Tab 3     Sig: Take 1 Tab by mouth daily.  atorvastatin (LIPITOR) 80 mg tablet 90 Tab 3     Sig: Take 1 Tab by mouth daily.  ezetimibe (ZETIA) 10 mg tablet 90 Tab 3     Sig: Take 1 Tab by mouth daily.  tiotropium (SPIRIVA WITH HANDIHALER) 18 mcg inhalation capsule 90 Cap 3     Sig: Take 1 Cap by inhalation daily.

## 2020-03-10 NOTE — PROGRESS NOTES
Met with  Ms. Kenya Arshad regarding recommendation for Left Breast Ultrasound Biopsy x 2. Explained procedure and answered all questions. She has been scheduled for biopsy at the [address] on Friday 3/13/20 @ 830am    Her follow up appointment with the Radiologist and Nurse Navigator to receive biopsy results is scheduled on: Wednesday 3/18/20      ULTRASOUND GUIDED BREAST BIOPSY   PRE-PROCEDURE PATIENT INSTRUCTIONS      · No fasting is required, eating a light meal is recommended. · Wear a loose-fitting outfit, patient will undress from the waist up and change into a gown for the procedure. · Bring or wear a tight fitting bra to wear after the procedure to support the breast.   · Deodorant is allowed. · Patient will be able to drive home after the procedure. · Patient should take all prescribed medications EXPECT FOR BLOOD THINNERS. Each blood-thinning medication is different; the breast navigator will inform the patient of specific instructions related to their prescriptions.

## 2020-03-11 RX ORDER — AMLODIPINE BESYLATE 5 MG/1
5 TABLET ORAL DAILY
Qty: 90 TAB | Refills: 3 | Status: SHIPPED | OUTPATIENT
Start: 2020-03-11 | End: 2021-06-06 | Stop reason: SDUPTHER

## 2020-03-11 RX ORDER — ATORVASTATIN CALCIUM 80 MG/1
80 TABLET, FILM COATED ORAL DAILY
Qty: 90 TAB | Refills: 3 | Status: SHIPPED | OUTPATIENT
Start: 2020-03-11 | End: 2021-06-06 | Stop reason: SDUPTHER

## 2020-03-11 RX ORDER — EZETIMIBE 10 MG/1
10 TABLET ORAL DAILY
Qty: 90 TAB | Refills: 3 | Status: SHIPPED | OUTPATIENT
Start: 2020-03-11 | End: 2021-06-06 | Stop reason: SDUPTHER

## 2020-03-13 ENCOUNTER — HOSPITAL ENCOUNTER (OUTPATIENT)
Dept: ULTRASOUND IMAGING | Age: 80
Discharge: HOME | End: 2020-03-13
Attending: INTERNAL MEDICINE
Payer: MEDICARE

## 2020-03-13 ENCOUNTER — HOSPITAL ENCOUNTER (OUTPATIENT)
Dept: MAMMOGRAPHY | Age: 80
Discharge: HOME | End: 2020-03-13
Attending: INTERNAL MEDICINE
Payer: MEDICARE

## 2020-03-13 DIAGNOSIS — R92.8 ABNORMAL MAMMOGRAM: ICD-10-CM

## 2020-03-13 DIAGNOSIS — N63.0 LUMP OR MASS IN BREAST: ICD-10-CM

## 2020-03-13 DIAGNOSIS — Z98.890 STATUS POST BREAST BIOPSY: ICD-10-CM

## 2020-03-13 PROCEDURE — 74011000250 HC RX REV CODE- 250: Performed by: INTERNAL MEDICINE

## 2020-03-13 PROCEDURE — 88360 TUMOR IMMUNOHISTOCHEM/MANUAL: CPT

## 2020-03-13 PROCEDURE — 19083 BX BREAST 1ST LESION US IMAG: CPT

## 2020-03-13 PROCEDURE — 19084 BX BREAST ADD LESION US IMAG: CPT

## 2020-03-13 PROCEDURE — 88305 TISSUE EXAM BY PATHOLOGIST: CPT

## 2020-03-13 PROCEDURE — 77065 DX MAMMO INCL CAD UNI: CPT

## 2020-03-13 RX ORDER — LIDOCAINE HYDROCHLORIDE 10 MG/ML
10 INJECTION, SOLUTION EPIDURAL; INFILTRATION; INTRACAUDAL; PERINEURAL
Status: COMPLETED | OUTPATIENT
Start: 2020-03-13 | End: 2020-03-13

## 2020-03-13 RX ORDER — LIDOCAINE HYDROCHLORIDE AND EPINEPHRINE 10; 10 MG/ML; UG/ML
20 INJECTION, SOLUTION INFILTRATION; PERINEURAL ONCE
Status: COMPLETED | OUTPATIENT
Start: 2020-03-13 | End: 2020-03-13

## 2020-03-13 RX ORDER — LIDOCAINE HYDROCHLORIDE AND EPINEPHRINE 10; 10 MG/ML; UG/ML
20 INJECTION, SOLUTION INFILTRATION; PERINEURAL
Status: DISCONTINUED | OUTPATIENT
Start: 2020-03-13 | End: 2020-03-13 | Stop reason: SDUPTHER

## 2020-03-13 RX ADMIN — LIDOCAINE HYDROCHLORIDE 10 ML: 10 INJECTION, SOLUTION EPIDURAL; INFILTRATION; INTRACAUDAL; PERINEURAL at 08:56

## 2020-03-13 RX ADMIN — LIDOCAINE HYDROCHLORIDE AND EPINEPHRINE 20 MG: 10; 10 INJECTION, SOLUTION INFILTRATION; PERINEURAL at 08:57

## 2020-03-13 RX ADMIN — LIDOCAINE HYDROCHLORIDE 10 ML: 10 INJECTION, SOLUTION EPIDURAL; INFILTRATION; INTRACAUDAL; PERINEURAL at 09:07

## 2020-03-18 ENCOUNTER — NURSE NAVIGATOR (OUTPATIENT)
Dept: SURGERY | Age: 80
End: 2020-03-18

## 2020-03-18 ENCOUNTER — TELEPHONE (OUTPATIENT)
Dept: INTERNAL MEDICINE CLINIC | Age: 80
End: 2020-03-18

## 2020-03-18 NOTE — TELEPHONE ENCOUNTER
Received call from [address] at [facility] stating they did a breast biopsy on patient and it came back positive for breast cancer. Patient is not aware of results yet and they will let her know today at 3pmThey wanted to know if Regine Romero wanted to place referral or could they between  and Ene Liu. Regine Romero was advised and stated .

## 2020-03-18 NOTE — PROGRESS NOTES
Ms. Tessa Antunez in in the office today to receive breast biopsy results from radiologist.  Ms Tessa Antunez has been diagnosed with Invasive ductal carcinoma of left breast.  Also present are her daughter and granddaughter who have accompanied her today. I provided a patient guide, as well as emotional support. All questions were answered to Ms. Michaels's satisfaction. Ms. [place] PCP,  Dr. Tessie Rendon, has requested a referral to Dr. Deshawn Susnhine for surgical consult. Her appointment has been scheduled for Monday, March 23 at 0830. Her daughter will be accompanying her to this appointment. I will notify Tamra Ríos, Nurse Navigator at [address] [place] [address], of patient's appointment. I encouraged Ms. Tessa Antunez and her family to contact me with any questions or concerns.

## 2020-03-23 ENCOUNTER — NURSE NAVIGATOR (OUTPATIENT)
Dept: OTHER | Age: 80
End: 2020-03-23

## 2020-03-23 ENCOUNTER — OFFICE VISIT (OUTPATIENT)
Dept: SURGERY | Age: 80
End: 2020-03-23

## 2020-03-23 VITALS
TEMPERATURE: 95.3 F | WEIGHT: 116 LBS | RESPIRATION RATE: 18 BRPM | SYSTOLIC BLOOD PRESSURE: 141 MMHG | OXYGEN SATURATION: 99 % | BODY MASS INDEX: 21.9 KG/M2 | HEART RATE: 93 BPM | DIASTOLIC BLOOD PRESSURE: 83 MMHG | HEIGHT: 61 IN

## 2020-03-23 DIAGNOSIS — Z17.0 MALIGNANT NEOPLASM OF OVERLAPPING SITES OF LEFT BREAST IN FEMALE, ESTROGEN RECEPTOR POSITIVE (HCC): Primary | ICD-10-CM

## 2020-03-23 DIAGNOSIS — C50.812 MALIGNANT NEOPLASM OF OVERLAPPING SITES OF LEFT BREAST IN FEMALE, ESTROGEN RECEPTOR POSITIVE (HCC): Primary | ICD-10-CM

## 2020-03-23 NOTE — LETTER
3/23/2020 9:35 AM 
 
Patient:  Leigh Ann Dominique YOB: 1940 Date of Visit: 3/23/2020 Tracy Mcmahan MD 
[place] [address] VIA In Basket Dear Tracy Mcmahan MD, 
 
 
I had the pleasure of seeing Ms. Ishan Barcenas in my office today for her newly diagnosed breast cancer. I am including a copy of my office visit today. If you have questions, please do not hesitate to call me. I look forward to following Ms. Danielle Herrera along with you and will keep you updated as to her progress. Sincerely, Carol Hadley MD

## 2020-03-23 NOTE — PROGRESS NOTES
Patient presents as referred by breast center for left ER/WY positive, HER negative invasive ductal carcinoma, s/p core biopsy 3/13/20

## 2020-03-23 NOTE — PATIENT INSTRUCTIONS
If you have any questions or concerns about today's appointment, the verbal and/or written instructions you were given for follow up care, please call our office at [phone]. [facility] - [address]    [phone] office  [phone] fax    Appointment:  Wednesday, March 25, 2020 at 2:00pm with Dr. Luis Parikh [facility] [address] [place], [address] Phone [phone] Fax [phone]

## 2020-03-23 NOTE — NURSE NAVIGATOR
Initial assessment for Kirti Mehta, newly diagnosed with left breast cancer. Meeting with pt included pt and daughter, Jimenez Nation. Patient was assessed for the following barriers:    Communication:       Yes    No  -Ability to read/write,understand [place]       [x]      []    -Ability to talk with family/children,friends about diagnosis     [x]      []    -Other:  Comments/Referrals/Services provided: Patient is able to discuss diagnosis with family and friends. Daughter, Jimenez Nation present during interview. Employment/Financial/Legal:     Yes    No  -Loss of employment/income         []      [x]    -Insurance coverage          [x]      []     -Needs help applying for Social Security/Disability/FMLA     []      [x]     -Help with Co-Pays for office visits         []      [x]    -Medication assistance/medical supplies or equipment     []      [x]    -Difficulty paying bills: utilities/housing       []      [x]    -Means to buy food                     [x]      []    -Legal issues           []      [x]    -Other:  Comments/Referrals/Services provided: Patient is retired. Has insurance with Medicaid/. No financial concerns today. Psychosocial        Yes    No  Housing:  -Homeless           []      [x]    -Extended care needs: long term care/home care/Hospice     []      [x]    -Other:  Comments/Referrals/Services provided: Patient owns home. Transportation:       Yes    No  -Lack of vehicle or public transportation options      []      [x]    -Funds need for public transportation: bus/taxi      []      [x]    -Other:  Comments/Referrals/Services provided: Patient drives self. Daughter also assist with transportation needs. Support system:       Yes No  -Family members at home: spouse/significant other/children    [x]      []    -Has a support system         [x]      []    Other:  Comments/Referrals/Services provided: Patient has good support system. Lives with daughter, Jimenez Nation.  Also has 2 adult sons who lives in surrounding area. Spirituality:        Yes No  -Spiritual issues          []      [x]    -Cultural concerns          []      [x]    -Other:  -Comments/Referrals/Services provided: No concerns today. Patient does not attend [employer]. Sexuality:        Yes No  -Body image concerns                     []      [x]    -Relationships/significant other issues        []      [x]    -Other:  Comments/Referrals/Services provided: No concerns today. Coping:        Yes    No  -Able to manage emotions         [x]      []    -Feeling fearful or anxious         []      [x]    -Interest in attending support groups        []      [x]    -Cancer related pain/control         []      [x]    -Other:  Comments/Referrals/Services provided: Patient is calm today. Tobacco dependency:      Yes No  -Currently smokes: cigarettes/cigars        [x]      []    -Uses smokeless tobacco         []      [x]    -Other:  Comments/Referrals/Services provided: Patient admits to smoking 2 pks cigarette/day for about 60 years. Education/review of Disease process and Management   Yes No  -Explanation of navigator role/contact information      [x]      []    New Patient Guide for Breast Cancer provided                      [x]     []         -Pathology/Staging          [x]      []    -Diagnostic tests          []      [x]    -Genetic testing needed         []      [x]    -Treatment options/plan: surgery/chemotherapy/radiation     [x]      []    -Mediport placement as needed                              []      [x]    -Tobacco cessation as needed        [x]      []    -Need for a second opinion         []      [x]    -Importance of bringing family/friends to medical appts     [x]      []   -Other:  Patient/family verbalized understanding of treatment plan: Yes. Dr. Arlet Russo discussed breast cancer treatment options with patient and daughter.  Plan is for patient to start hormone blocking therapy first because of her age and the present threat of COVID19 pandemic. Surgery will follow and will be scheduled at a later date. Patient has elected to have a lumpectomy. Patient was referred to med/onc Dr. Kimberley Chong to commence hormone blocking therapy. Appointment scheduled for 3/25/2020. Patient will follow up with Dr. Safia Heredia in 3 months. Breast cancer guide provided by nurse navigator-Onelia sharpe [facility] on prior appointment. All questions answered. NCCN Distress Tool     Laxmi Hall was assessed for management of distress using the NCCN Distress Thermometer for Patients tool. Mild to moderate distress  Scorin-4        Yes    No    -Practical/physical issues       []      [x]      -Provide community resources      [x]      []      -Provide list of support groups      [x]      []      -Provide list of national organizations and websites               [x]      []      -Provide ongoing supportive care by oncology medical team        [x]      []                  Comments/Referrals/Services provided:  Yes. Score-0    Moderate to severe distress  Scorin or greater                   Yes    No    -Navigator consulted with MD      []      []     -Navigator follow up         []      []     -Spiritual concerns        []      []     -Emotional/family concerns       []      []     -Refer to /mental health professional/PCP   []      []      Comments/Referral/Services provided:  Yes.

## 2020-03-23 NOTE — PROGRESS NOTES
Breast Cancer Consult    Ms. 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 The area of concern was identified on screening imaging. She denied palpable mass. She denied nipple discharge. She denied breast pain. There family history of breast cancer. A sister had pancreatic cancer. Her most recent previous mammogram was November 2015. She already follows with Dr. Marita Hummel for her monoclonal gammopathy. Breast/GYN history:    OB History    No obstetric history on file. Past Medical History:   Diagnosis Date    Allergic rhinitis     Colon adenoma     1/15 Dr Tanika Bejarano; polyp 2006 Dr Christy Corona COPD     on films    DJD (degenerative joint disease)     Dyslipidemia     FHx: heart disease     GERD (gastroesophageal reflux disease)     s/p dilation Dr. Helga Baker 2006; [place] and esophagitis 1/15 Dr Tanika Bejarano    Monoclonal gammopathy 2011 Dr. Barksdale Flax Osteoporosis Dr. Parag Gonzáles, t score -0.6 spine, -2.7 hip     took forteo 2012 to 2014 Dr Herring    Recurrent BCC (basal cell carcinoma)     Dr. Herman Tristan dependence syndrome     UTI (urinary tract infection)        Past Surgical History:   Procedure Laterality Date    BREAST SURGERY PROCEDURE UNLISTED  1992    negative right breast biopsy Dr. Valencia Baker 2006 polyp; Dr Tanika Bejarano 1/15 adenoma    HX HYSTERECTOMY  1970s    for precancerous lesions in the cervix    HX ORTHOPAEDIC      DEXA t score 0.32 spine, -2.28 hip FRAX 5.3 hip Dr Parag Gonzáles (12/13); spine 0.68, hip -2.51 w FRAX 7.2 (12/15)    HX ORTHOPAEDIC  05/2017    right CTS release and thumb surgery Dr Rod Mantle      s/p c spine [facility] 2010?     NEUROLOGICAL PROCEDURE UNLISTED  12/2017    MMSE 30/30       Current Outpatient Medications on File Prior to Visit   Medication Sig Dispense Refill    amLODIPine (NORVASC) 5 mg tablet Take 1 Tab by mouth daily. 90 Tab 3    atorvastatin (LIPITOR) 80 mg tablet Take 1 Tab by mouth daily. 90 Tab 3    ezetimibe (Zetia) 10 mg tablet Take 1 Tab by mouth daily. 90 Tab 3    tiotropium (Spiriva with HandiHaler) 18 mcg inhalation capsule Take 1 Cap by inhalation daily. 90 Cap 3    denosumab (PROLIA) 60 mg/mL injection 60 mg by SubCUTAneous route every 6 months.  CALCIUM CARBONATE (CALCIUM 500 PO) Take  by mouth daily.  albuterol (PROVENTIL HFA, VENTOLIN HFA, PROAIR HFA) 90 mcg/actuation inhaler Take 1 Puff by inhalation every six (6) hours as needed for Wheezing. 1 Inhaler 5    fluticasone (FLONASE) 50 mcg/actuation nasal spray 2 Sprays by Both Nostrils route daily. (Patient taking differently: 2 Sprays by Both Nostrils route as needed.) 3 Bottle 3    Cholecalciferol, Vitamin D3, (VITAMIN D3) 1,000 unit Cap Take 1,000 Units by mouth daily.  aspirin delayed-release 81 mg tablet Take 81 mg by mouth daily.  cyanocobalamin 1,000 mcg tablet Take 1,000 mcg by mouth daily. No current facility-administered medications on file prior to visit.         Allergies   Allergen Reactions    Penicillins Rash and Hives       Social History     Tobacco Use    Smoking status: Current Every Day Smoker     Packs/day: 1.50     Years: 50.00     Pack years: 75.00     Types: Cigarettes    Smokeless tobacco: Never Used   Substance Use Topics    Alcohol use: No    Drug use: No       Family History   Problem Relation Age of Onset    Heart Attack Father     Kidney Disease Mother         ADPKD    Diabetes Sister     Cancer Sister         leukemia         ROS: positives are bolded  General: fevers, chills, night sweats, fatigue, weight loss, weight gain  GI: nausea, vomiting, abdominal pain, change in bowel habits, hematochezia, melena, GERD  Integ: dermatitis, abnormal moles  HEENT: visual changes, vertigo, epistaxis, dysphagia, hoarseness  Cardiac: chest pain, palpitations, HTN, edema, varicosities  Resp: cough, shortness of breath, wheezing, hemoptysis, snoring, reactive airway disease  : hematuria, dysuria, frequency, urgency, nocturia, stress urinary incontinence   MSK: weakness, joint pain, arthritis  Endocrine:  thyroid disease, polyuria, polydipsia, polyphagia, poor wound healing, heat intolerance, cold intolerance  Lymph/Heme: anemia, bruising, history of blood transfusions  Neuro: dizziness, headache, fainting, seizures, stroke  Psych: anxiety, depression    Physical Exam:  Visit Vitals  /83 (BP 1 Location: Right arm, BP Patient Position: Sitting)   Pulse 93   Temp 95.3 °F (35.2 °C) (Oral)   Resp 18   Ht 5' 1\" (1.549 m)   Wt 52.6 kg (116 lb)   SpO2 99%   BMI 21.92 kg/m²       Gen:  No distress  Head: normocephalic, atraumatic  Mouth: Clear, no overt lesions, oral mucosa pink and moist  Neck: supple, no masses, no adenopathy, trachea midline  Resp: clear bilaterally  Cardio: Regular rate and rhythm  Abdomen: soft, nontender, nondistended  Extremeties: warm, well-perfused  Neuro: sensation and strength grossly intact and symmetrical  Psych: alert and oriented to person, place and time  Breasts:   Right: Examined in both the supine and upright positions. There was no supraclavicular, infraclavicular, or axillary lympadenopathy. There were no dominant masses, no skin changes, no asymmetry identified   Left: Examined in both the supine and upright positions. There was no supraclavicular, infraclavicular, or axillary lympadenopathy. There were no skin changes core biopsy wound clean, fullness/ecchymosis upper central breast visible on inspection and exam      Imaging:  3/10/20 left mammo/ultrasound ([facility])  Irregular hypoechoic masses at the left breast the 11:30 position, 3 cm from the  nipple and 12:30 position, 3 cm from nipple are highly suspicious.  Recommend  ultrasound-guided biopsy of both masses.     Findings were discussed with the patient and her daughter at the time of the  exam.      BIRADS 5:  Highly suggestive of malignancy:  appropriate action should be taken     Breast Density B: There are scattered areas of fibroglandular density. 3/4/20 bilateral mammo ([facility])  2 spiculated masses in the left breast including upper inner quadrant and upper  outer quadrant. 2-D 3-D CC and MLO spot compression and mediolateral views  recommended along with ultrasound. Ultrasound of the left axilla recommended.     BI-RADS Assessment Category 0: Incomplete. Need additional imaging evaluation.      Density B: There are scattered areas of fibroglandular density. Pathology:  3/13/20   A: LEFT BREAST, 11:30, CORE NEEDLE BIOPSY-   INFILTRATING DUCTAL ADENOCARCINOMA, NUCLEAR GRADE 2. Estrogen Receptor (ER): Positive (>90% of cells stain with strong intensity). Progesterone Receptor (PgR): Positive (>90 % of cells stain with moderate intensity). Her-2 (IHC Method): Negative (score 1+)   Percentage of cells with intense complete membrane stainin%    B: LEFT BREAST, 12:30, CORE NEEDLE BIOPSY-   INFILTRATING DUCTAL ADENOCARCINOMA, NUCLEAR GRADE 2. Estrogen Receptor (ER): Positive (>90% of cells stain with strong intensity). Progesterone Receptor (PgR): Positive (>90 % of cells stain with strong intensity). Her-2 (IHC Method): Negative (score 1+)   Percentage of cells with intense complete membrane stainin% .      Impression:  Patient Active Problem List   Diagnosis Code    Colon polyps Dr. Page Richards 2006 K63.5    Osteoporosis Dr. Lizarraga Numbers M81.0    Monoclonal gammopathy  Dr. Javad Terrell D47.2    COPD on films J44.9    Dyslipidemia E78.5    Arthritis, degenerative M19.90    Gastroesophageal reflux disease without esophagitis K21.9    Advance directive discussed with patient Z71.89    Tobacco dependence syndrome F17.200    Primary hypertension I10    Cancer of overlapping sites of left female breast [facility]) C49.80          79year old woman with left ER/TN positive, HER negative invasive ductal carcinoma, s/p core biopsy 3/13/20. We discussed that there are many options for treatment of breast cancer. Surgery, chemotherapy, radiation and hormone therapy are all tools that may be used in the treatment of breast cancer. For each patient, we determine which will be most beneficial based on her individual set of circumstances. For some patients all four treatment categories will be recommended. For others one or more of these options are not appropriate. We began by reviewing her imaging thus far as well as pathology in detail. Chemotherapy was addressed first.  Chemotherapy is systemic treatment aimed largely to decrease chance of spread or recurrence of cancer. It is administered by a medical oncologist.  Often the decision for chemotherapy is made after final pathology. I have recommended referral to medical oncology for additional information regarding chemotherapy. Regarding surgery, there are two main options, lumpectomy or mastectomy. We discussed both in detail. Overall survival and distant recurrence rates are the same. The decision is generally a personal decision more so than a medical one. Lumpectomy is also known as breast conservation surgery or partial mastectomy. The goal is to remove the area of concern as well as surrounding area of uninvolved tissue (\"clear margins\"). Radiation is almost always recommended with lumpectomy to allow for acceptable local recurrence rates. Local recurrence rates are approximately 6% after lumpectomy with radiation. Risks, benefits and options were discussed in detail to include, but not limited to, bleeding, infection, risks of anesthesia, injury to surrounding structures and other unforeseen events such as stroke, heart attack or death. Mastectomy was then addressed. With mastectomy, almost all of the breast tissue is removed. We are not able to remove 100% of the breast tissue.   The risk of local recurrence is approximately 2-4% after mastectomy. The overlying skin is generally numb. Most often, the numbness is permanent. Mastectomy can be performed with or without reconstruction. The reconstruction is performed by the plastic surgeon. Commonly it is a multi-step process with placement of tissue expanders as the first step. If she is interested in reconstruction, I will refer her to plastic surgery. Often we are able to perform a nipple sparing mastectomy with reconstruction. The reconstructed breast differs in many ways from the native breast.  The goal is that in a bra or clothing, no one can tell she has had a mastectomy. Radiation generally is not needed after mastectomy. There are some circumstances, usually based on size, margins, local extension or lymph node status, where post-mastectomy radiation is recommended. Risks, benefits and options were discussed in detail to include, but not limited to, bleeding, infection, risks of anesthesia, injury to surrounding structures and other unforeseen events such as stroke, heart attack or death. Routine screening mammogram is not recommended after mastectomy. As she is clinically node negative, she is a candidate for sentinel node biopsy. We discussed this procedure is a targeted sampling of the axillary lymph nodes to allow for staging of her disease. She will be injected with radioactive isotope as well as blue dye to allow for mapping and removal of the sentinel nodes. By removing only the sentinel nodes and not performing axillary node dissection, she has a decreased risk of lymphedema (arm swelling) and nerve injury. We did specifically discuss that both of these risks still exist.   Risks, benefits and options were discussed in detail to include, but not limited to, bleeding, infection, risks of anesthesia, injury to surrounding structures and other unforeseen events such as stroke, heart attack or death.     If a significant amount of cancer is noted in her lymph nodes, an axillary lymph node dissection may be recommended. In this procedure more, but not all, of the lymph nodes under the arm are removed. The chance of both lymphedema and nerve injury are increased with axillary node dissection compared to sentinel node biopsy. I generally recommend referral to physical therapy and a lymphedema specialist if an axillary node dissection is performed. We discussed radiation. Radiation is a local therapy aimed to decrease the chance of local recurrence. It is administered under the direction of a radiation oncologist.  Radiation is almost always recommended with lumpectomy. It generally is not needed after mastectomy. There are some circumstances, usually based on size, margins, local extension or lymph node status, where post-mastectomy radiation is recommended. Most commonly it is administered five days a week for up to seven weeks. Most of the side effects, with the exception of fatigue, are local.      Anti-hormone or hormone blocking therapy is used in hormone sensitive, estrogen receptor positive breast cancer. It is generally recommended for 5-10 years. There are two categories of hormone blocking medications. Tamoxifen is a selective estrogen reuptake modulator (SERM). There are several aromatase inhibitors. These medications are generally prescribed by a medical oncologist.      We discussed that generally speaking, surgery would be the first step, followed by chemotherapy and radiation (if either recommended), with hormone blocking therapy as the last step. Due to the COVID pandemic, based on recommendations issued from medical and government sources, I have recommended consideration of upfront aromatase inhibitor with delay of surgery until resources are more available and non-emergent surgery is permitted. Tobacco cessation recommended.      After discussing the above, Ms. Scott Ulrich understands and is willing to proceed with hormone blocking therapy. We will schedule consultation with Dr. Jackie Burger, whom she already sees for her monoclonal gammopathy. Follow up with me 3 months. All questions were answered. She was asked to call with any additional questions or concerns.

## 2020-03-24 ENCOUNTER — HOSPITAL ENCOUNTER (OUTPATIENT)
Dept: LAB | Age: 80
Discharge: HOME | End: 2020-03-24
Payer: MEDICARE

## 2020-03-24 ENCOUNTER — APPOINTMENT (OUTPATIENT)
Dept: INTERNAL MEDICINE CLINIC | Age: 80
End: 2020-03-24

## 2020-03-24 DIAGNOSIS — D47.2 MONOCLONAL GAMMOPATHY: ICD-10-CM

## 2020-03-24 LAB
ERYTHROCYTE [DISTWIDTH] IN BLOOD BY AUTOMATED COUNT: 15.2 % (ref 11.6–14.5)
HCT VFR BLD AUTO: 45.6 % (ref 35–45)
HGB BLD-MCNC: 14.8 G/DL (ref 12–16)
MCH RBC QN AUTO: 29.5 PG (ref 24–34)
MCHC RBC AUTO-ENTMCNC: 32.5 G/DL (ref 31–37)
MCV RBC AUTO: 90.8 FL (ref 74–97)
PLATELET # BLD AUTO: 266 K/UL (ref 135–420)
PMV BLD AUTO: 10.6 FL (ref 9.2–11.8)
RBC # BLD AUTO: 5.02 M/UL (ref 4.2–5.3)
WBC # BLD AUTO: 7.6 K/UL (ref 4.6–13.2)

## 2020-03-24 PROCEDURE — 36415 COLL VENOUS BLD VENIPUNCTURE: CPT

## 2020-03-24 PROCEDURE — 85027 COMPLETE CBC AUTOMATED: CPT

## 2020-03-30 NOTE — PROGRESS NOTES
79 y.o. WHITE OR  female who presents for f/u    No cardiovascular complaints, no exercise but remains active with household chores. Using the spiriva most days and only using the rescue inhaler rarely, no wheezing or sob.   Averaging 1 ppd, not wanting anything for smoking cessation    Denies any GI complaints     She does report dysuria and frequency about a week although no f, flank pain, hematuria    She had abn mammo  LEFT and underwent bx which did show infiltrating ductal ca 2 sites; she saw Dr Titus Skiff and plan is for her to see Dr Cleo Naylor for initiation of aromatase therapy while awaiting clearance for elective surgery once the Covid clears up    [address]: 4/30/14, 5/1/15, 5/19/16, 5/16/17, 9/26/19      Past Medical History:   Diagnosis Date    Allergic rhinitis     Breast cancer ([facility] [address] 03/2020    Dr Titus Skiff; ER/ID+, HER-2 neg infiltrating ductal ca 2 sites LEFT breast; Dr Samantha Munroe adenoma     1/15 Dr Cayla Veloz; polyp 2006 Dr Sixto Cowden COPD     on films    DJD (degenerative joint disease)     Dyslipidemia     FHx: heart disease     GERD (gastroesophageal reflux disease)     s/p dilation Dr. Tiffani Llamas 2006; [facility] and esophagitis 1/15 Dr Cayla Veloz    Monoclonal gammopathy 2011 Dr. Bryn Hancock Osteoporosis Dr. Jorge L Brock, t score -0.6 spine, -2.7 hip     took forteo 2012 to 2014 Dr Herring    Recurrent BCC (basal cell carcinoma)     Dr. Bill Keiry dependence syndrome      Past Surgical History:   Procedure Laterality Date   [place] Carry    neg RIGHT breast biopsy Dr. Perri Llamas 2006 polyp; Dr Cayla Veloz 1/15 adenoma    HX HYSTERECTOMY  1970s    for precancerous lesions in the cervix    HX ORTHOPAEDIC      DEXA t score 0.32 spine, -2.28 hip FRAX 5.3 hip Dr Jorge L Brock (12/13); spine 0.68, hip -2.51 w FRAX 7.2 (12/15)    HX ORTHOPAEDIC  05/2017    RIGHT CTS release and thumb surgery Dr Korin Colon NEUROLOGICAL PROCEDURE UNLISTED      s/p c spine surgery [facility] 2010?  NEUROLOGICAL PROCEDURE UNLISTED  12/2017    MMSE 30/30    PA BIOPSY OF BREAST, NEEDLE CORE Left 03/2020    seeing Dr Ronaldo Wheatley History     Socioeconomic History    Marital status:      Spouse name: Not on file    Number of children: 3    Years of education: Not on file    Highest education level: Not on file   Occupational History    Occupation: retired [employer]   Social Needs    Financial resource strain: Not on file    Food insecurity     Worry: Not on file     Inability: Not on file   [employer] needs     Medical: Not on file     Non-medical: Not on file   Tobacco Use    Smoking status: Current Every Day Smoker     Packs/day: 1.50     Years: 50.00     Pack years: 75.00     Types: Cigarettes    Smokeless tobacco: Never Used   Substance and Sexual Activity    Alcohol use: No    Drug use: No    Sexual activity: Not on file   Lifestyle    Physical activity     Days per week: Not on file     Minutes per session: Not on file    Stress: Not on file   Relationships    Social connections     Talks on phone: Not on file     Gets together: Not on file     Attends Judaism service: Not on file     Active member of club or organization: Not on file     Attends meetings of clubs or organizations: Not on file     Relationship status: Not on file    Intimate partner violence     Fear of current or ex partner: Not on file     Emotionally abused: Not on file     Physically abused: Not on file     Forced sexual activity: Not on file   Other Topics Concern    Not on file   Social History Narrative    Not on file     Allergies   Allergen Reactions    Penicillins Rash and Hives       Current Outpatient Medications   Medication Sig    amLODIPine (NORVASC) 5 mg tablet Take 1 Tab by mouth daily.  atorvastatin (LIPITOR) 80 mg tablet Take 1 Tab by mouth daily.  ezetimibe (Zetia) 10 mg tablet Take 1 Tab by mouth daily.  tiotropium (Spiriva with HandiHaler) 18 mcg inhalation capsule Take 1 Cap by inhalation daily.  CALCIUM CARBONATE (CALCIUM 500 PO) Take  by mouth daily.  cyanocobalamin 1,000 mcg tablet Take 1,000 mcg by mouth daily.  albuterol (PROVENTIL HFA, VENTOLIN HFA, PROAIR HFA) 90 mcg/actuation inhaler Take 1 Puff by inhalation every six (6) hours as needed for Wheezing.  fluticasone (FLONASE) 50 mcg/actuation nasal spray 2 Sprays by Both Nostrils route daily. (Patient taking differently: 2 Sprays by Both Nostrils route as needed.)    Cholecalciferol, Vitamin D3, (VITAMIN D3) 1,000 unit Cap Take 1,000 Units by mouth daily.  aspirin delayed-release 81 mg tablet Take 81 mg by mouth daily.  denosumab (PROLIA) 60 mg/mL injection 60 mg by SubCUTAneous route every 6 months. No current facility-administered medications for this visit. REVIEW OF SYSTEMS:  S/p hyst, mammo 3/20, DEXA 12/15 Dr Guille Licea, colo 1/15 Dr Indra Leach, sees Dr Sandra Lozano and Dr Love Saunders  Ophtho - no vision change or eye pain  Oral - no mouth pain, tongue or tooth problems  Ears - no hearing loss, ear pain, fullness, no swallowing problems  Cardiac - no CP, PND, orthopnea, edema, palpitations or syncope  Chest - no breast masses  Resp - no wheezing, chronic coughing, dyspnea  GI - no heartburn, nausea, vomiting, change in bowel habits, bleeding, hemorrhoids  Urinary - no dysuria, hematuria, flank pain, urgency, frequency    Visit Vitals  /80   Pulse 89   Temp 97.6 °F (36.4 °C) (Oral)   Resp 14   Ht 5' 1\" (1.549 m)   Wt 116 lb (52.6 kg)   SpO2 99%   BMI 21.92 kg/m²     A&O x3  Affect is appropriate. Mood stable  No apparent distress  Anicteric, no JVD, adenopathy or thyromegaly. No carotid bruits or radiated murmur  Lungs clear to auscultation, no wheezes or rales  Heart showed regular rate and rhythm. No murmur, rubs, gallops  Abdomen soft nontender, no hepatosplenomegaly or masses. Extremities without edema.   Pulses 1-2+ symmetrically.       LABS  From 5/11 showed   gluc 90,   cr 0.70,    alt 21,          chol 149, tg 100, hdl 29, ldl-c 100, wbc 9.8, hb 14.2, plt 276  From 10/11 showed gluc 100, cr 1.04, gfr 55  From 11/11 showed                   ldl-p 1685, chol 157, tg 116, hdl 41, ldl-c 93,   spep+  From 3/12 showed   gluc 91,   cr 0.84, gfr 70,  alt 17, ldl-p 1309, chol 126, tg 86,   hdl 37, ldl-c 72  From 8/12 showed   gluc 95,   cr 0.86, gfr 68,  alt 21  From 3/13 showed                   ldl-p 1281, chol 118, tg 93,   hdl 40, ldl-c 59  From 9/13 showed   gluc 92,   cr 0.82, gfr 72,  alt 19, ldl-p 1268, chol 136, tg 71,   hdl 42, ldl-c 80,   wbc 7.7, hb 13.4, plt 310, ua neg pro  From 3/14 showed   gluc 92,   cr 0.88, gfr>60, alt<5,          chol 122, tg 104, hdl 35, ldl-c 66,   wbc 7.5, hb 12.7, plt 312, ua neg  From 10/14 showed               chol 140, tg 100, hdl 36, ldl-c 84  From 4/15 showed   gluc 94,   cr 0.96, gfr>60, alt 6,          wbc 9.3, hb 14.0, plt 313  From 11/15 showed               chol 144, tg 79,   hdl 44, ldl-c 84  From 5/16 showed   gluc 82,   cr 0.82, gfr>60, alt 26,          wbc 5.3, hb 14.6, plt 241  From 11/16 showed               chol 217, tg 165, hdl 47, ldl-c 137  From 5/17 showed   gluc 90,   cr 0.75, gfr>60, alt 29,          wbc 7.4, hb 14.7, plt 224  From 6/17 showed   gluc 86,   cr 0.86, gfr>60,           wbc 8.4, hb 15.4, plt 235  From 9/17 showed   gluc 80,   cr 0.81, gfr 71,  alt 18,          wbc 8.4, hb 15.1, plt 267, fe 113, %sat 37, b12 959, FERNY+ igg mono lambda light chain, vit d 41.5  From 12/17 showed               chol 109, tg 73,   hdl 49, ldl-c 45  From 8/18 showed   gluc 89,   cr 0.78, gfr>60,           chol 120, tg 80,   hdl 48, ldl-c 56  From 4/19 showed                  wbc 8.8, hb 14.5, plt 259  From 9/19 showed   gluc 85,   cr 0.81, gfr>60, alt 23,          chol 136, tg 92,   hdl 46, ldl-c 71    Results for orders placed or performed during the hospital encounter of 03/24/20 CBC W/O DIFF   Result Value Ref Range    WBC 7.6 4.6 - 13.2 K/uL    RBC 5.02 4.20 - 5.30 M/uL    HGB 14.8 12.0 - 16.0 g/dL    HCT 45.6 (H) 35.0 - 45.0 %    MCV 90.8 74.0 - 97.0 FL    MCH 29.5 24.0 - 34.0 PG    MCHC 32.5 31.0 - 37.0 g/dL    RDW 15.2 (H) 11.6 - 14.5 %    PLATELET 318 304 - 866 K/uL    MPV 10.6 9.2 - 11.8 FL     Patient Active Problem List   Diagnosis Code    Colon polyps Dr. Nicola Tejada 2006 K63.5    Osteoporosis Dr. Valerio Files M81.0    Monoclonal gammopathy 2011 Dr. Nia Green D47.2    COPD on films J44.9    Dyslipidemia E78.5    Arthritis, degenerative M19.90    Gastroesophageal reflux disease without esophagitis K21.9    Advance directive discussed with patient Z71.89    Tobacco dependence syndrome F17.200    Primary hypertension I10    Cancer of overlapping sites of left female breast ([facility] [address] C50.812     ASSESSMENT AND PLAN:  1. Dyslipidemia. Continue current regimen  2. GERD. Daily PPI and avoidance measures  3. DJD. Continue prn otc meds  4. Colon polyps. Fiber  5. COPD. Continue current. 6.  Osteoporosis. F/U Dr. Valerio Files as sched  7. MGUS. F/U Dr. Onelia Burger  8. Allergies. F/U Dr. Piero Arenas, flonase cont  9. Smoking cessation. Has chantix script at home and will fill when she's ready  10. HTN. Continue current regimen. 11.  Breast ca. Per Dr Ger Fung and Dr Onelia Burger      RTC 9/20    Above conditions discussed at length and patient vocalized understanding.   All questions answered to patient satisfaction

## 2020-03-31 ENCOUNTER — TELEPHONE (OUTPATIENT)
Dept: INTERNAL MEDICINE CLINIC | Age: 80
End: 2020-03-31

## 2020-03-31 ENCOUNTER — OFFICE VISIT (OUTPATIENT)
Dept: INTERNAL MEDICINE CLINIC | Age: 80
End: 2020-03-31

## 2020-03-31 ENCOUNTER — HOSPITAL ENCOUNTER (OUTPATIENT)
Dept: LAB | Age: 80
Discharge: HOME | End: 2020-03-31
Payer: MEDICARE

## 2020-03-31 VITALS
OXYGEN SATURATION: 99 % | SYSTOLIC BLOOD PRESSURE: 136 MMHG | HEIGHT: 61 IN | HEART RATE: 89 BPM | DIASTOLIC BLOOD PRESSURE: 80 MMHG | WEIGHT: 116 LBS | BODY MASS INDEX: 21.9 KG/M2 | RESPIRATION RATE: 14 BRPM | TEMPERATURE: 97.6 F

## 2020-03-31 DIAGNOSIS — Z17.0 MALIGNANT NEOPLASM OF OVERLAPPING SITES OF LEFT BREAST IN FEMALE, ESTROGEN RECEPTOR POSITIVE (HCC): ICD-10-CM

## 2020-03-31 DIAGNOSIS — F17.200 TOBACCO DEPENDENCE SYNDROME: ICD-10-CM

## 2020-03-31 DIAGNOSIS — R30.0 DYSURIA: Primary | ICD-10-CM

## 2020-03-31 DIAGNOSIS — J44.9 CHRONIC OBSTRUCTIVE PULMONARY DISEASE, UNSPECIFIED COPD TYPE (HCC): ICD-10-CM

## 2020-03-31 DIAGNOSIS — R30.0 DYSURIA: ICD-10-CM

## 2020-03-31 DIAGNOSIS — C50.812 MALIGNANT NEOPLASM OF OVERLAPPING SITES OF LEFT BREAST IN FEMALE, ESTROGEN RECEPTOR POSITIVE (HCC): ICD-10-CM

## 2020-03-31 DIAGNOSIS — E78.5 DYSLIPIDEMIA: ICD-10-CM

## 2020-03-31 DIAGNOSIS — I10 PRIMARY HYPERTENSION: ICD-10-CM

## 2020-03-31 DIAGNOSIS — N30.00 ACUTE CYSTITIS WITHOUT HEMATURIA: ICD-10-CM

## 2020-03-31 DIAGNOSIS — D47.2 MONOCLONAL GAMMOPATHY: ICD-10-CM

## 2020-03-31 LAB
BILIRUB UR QL STRIP: NEGATIVE
GLUCOSE UR-MCNC: NEGATIVE MG/DL
KETONES P FAST UR STRIP-MCNC: NEGATIVE MG/DL
PH UR STRIP: 6.5 [PH] (ref 4.6–8)
PROT UR QL STRIP: NEGATIVE
SP GR UR STRIP: 1.01 (ref 1–1.03)
UA UROBILINOGEN AMB POC: NORMAL (ref 0.2–1)
URINALYSIS CLARITY POC: NORMAL
URINALYSIS COLOR POC: YELLOW
URINE BLOOD POC: NORMAL
URINE LEUKOCYTES POC: NORMAL
URINE NITRITES POC: POSITIVE

## 2020-03-31 PROCEDURE — 87077 CULTURE AEROBIC IDENTIFY: CPT

## 2020-03-31 PROCEDURE — 87186 SC STD MICRODIL/AGAR DIL: CPT

## 2020-03-31 PROCEDURE — 87086 URINE CULTURE/COLONY COUNT: CPT

## 2020-03-31 RX ORDER — SULFAMETHOXAZOLE AND TRIMETHOPRIM 800; 160 MG/1; MG/1
1 TABLET ORAL 2 TIMES DAILY
Qty: 10 TAB | Refills: 0 | Status: SHIPPED | OUTPATIENT
Start: 2020-03-31 | End: 2020-04-03 | Stop reason: ALTCHOICE

## 2020-03-31 NOTE — TELEPHONE ENCOUNTER
Joyce Arguello MD at 03/31/20 1020     Status: Signed      Pt c/o dysuria           Results for orders placed or performed in visit on 03/31/20   AMB POC URINALYSIS DIP STICK AUTO W/O MICRO   Result Value Ref Range     Color (UA POC) Yellow       Clarity (UA POC) Cloudy       Glucose (UA POC) Negative Negative     Bilirubin (UA POC) Negative Negative     Ketones (UA POC) Negative Negative     Specific gravity (UA POC) 1.015 1.001 - 1.035     Blood (UA POC) 1+ Negative     pH (UA POC) 6.5 4.6 - 8.0     Protein (UA POC) Negative Negative     Urobilinogen (UA POC) 0.2 mg/dL 0.2 - 1     Nitrites (UA POC) Positive Negative     Leukocyte esterase (UA POC) 3+ Negative      Will send off culture  Empiric bactrim called in        Pt aware of message below and verbalized understanding. No further questions or concerns from pt at this time.

## 2020-03-31 NOTE — PROGRESS NOTES
Pt c/o dysuria    Results for orders placed or performed in visit on 03/31/20   AMB POC URINALYSIS DIP STICK AUTO W/O MICRO   Result Value Ref Range    Color (UA POC) Yellow     Clarity (UA POC) Cloudy     Glucose (UA POC) Negative Negative    Bilirubin (UA POC) Negative Negative    Ketones (UA POC) Negative Negative    Specific gravity (UA POC) 1.015 1.001 - 1.035    Blood (UA POC) 1+ Negative    pH (UA POC) 6.5 4.6 - 8.0    Protein (UA POC) Negative Negative    Urobilinogen (UA POC) 0.2 mg/dL 0.2 - 1    Nitrites (UA POC) Positive Negative    Leukocyte esterase (UA POC) 3+ Negative     Will send off culture  Empiric bactrim called in

## 2020-03-31 NOTE — PROGRESS NOTES
Duong  presents today for   Chief Complaint   Patient presents with    Anemia     6 month follow up with labs              Depression Screening:  3 most recent PHQ Screens 3/31/2020   Little interest or pleasure in doing things Not at all   Feeling down, depressed, irritable, or hopeless Not at all   Total Score PHQ 2 0       Learning Assessment:  Learning Assessment 2/20/2019   PRIMARY LEARNER Patient   HIGHEST LEVEL OF EDUCATION - PRIMARY LEARNER  DID NOT GRADUATE [facility] PRIMARY LEARNER NONE   CO-LEARNER CAREGIVER No   PRIMARY LANGUAGE ENGLISH   LEARNER PREFERENCE PRIMARY DEMONSTRATION     -     -   ANSWERED BY patient   RELATIONSHIP SELF       Abuse Screening:  Abuse Screening Questionnaire 3/31/2020   Do you ever feel afraid of your partner? N   Are you in a relationship with someone who physically or mentally threatens you? N   Is it safe for you to go home? Y       Fall Risk  Fall Risk Assessment, last 12 mths 3/31/2020   Able to walk? Yes   Fall in past 12 months? No     Health Maintenance Due   Topic Date Due    GLAUCOMA SCREENING Q2Y  11/28/2018    Influenza Age 5 to Adult  08/01/2019             Coordination of Care:  1. Have you been to the ER, urgent care clinic since your last visit? Hospitalized since your last visit? no    2. Have you seen or consulted any other health care providers outside of the [address] [place] [address] since your last visit? Include any pap smears or colon screening.  no

## 2020-04-03 ENCOUNTER — TELEPHONE (OUTPATIENT)
Dept: INTERNAL MEDICINE CLINIC | Age: 80
End: 2020-04-03

## 2020-04-03 DIAGNOSIS — N30.00 ACUTE CYSTITIS WITHOUT HEMATURIA: Primary | ICD-10-CM

## 2020-04-03 LAB
BACTERIA SPEC CULT: ABNORMAL
CC UR VC: ABNORMAL
SERVICE CMNT-IMP: ABNORMAL

## 2020-04-03 RX ORDER — CIPROFLOXACIN 500 MG/1
500 TABLET ORAL 2 TIMES DAILY
Qty: 10 TAB | Refills: 0 | Status: SHIPPED | OUTPATIENT
Start: 2020-04-03 | End: 2020-04-08

## 2020-04-03 NOTE — TELEPHONE ENCOUNTER
Haven't started abx yet because pharmacy wont have them in until Saturday. Offered to send somewhere else and patient refused. Pt stated she is just having burning when urinating.

## 2020-04-03 NOTE — TELEPHONE ENCOUNTER
pls call pt - we're changing abx from bactrim to cipro per the urine culture  pls call pharmacy and ca the bactrim

## 2020-06-18 ENCOUNTER — NURSE NAVIGATOR (OUTPATIENT)
Dept: OTHER | Age: 80
End: 2020-06-18

## 2020-06-18 ENCOUNTER — OFFICE VISIT (OUTPATIENT)
Dept: SURGERY | Age: 80
End: 2020-06-18

## 2020-06-18 ENCOUNTER — TELEPHONE (OUTPATIENT)
Dept: SURGERY | Age: 80
End: 2020-06-18

## 2020-06-18 VITALS
RESPIRATION RATE: 20 BRPM | WEIGHT: 120.8 LBS | TEMPERATURE: 97.2 F | HEIGHT: 61 IN | OXYGEN SATURATION: 98 % | SYSTOLIC BLOOD PRESSURE: 144 MMHG | DIASTOLIC BLOOD PRESSURE: 83 MMHG | HEART RATE: 91 BPM | BODY MASS INDEX: 22.81 KG/M2

## 2020-06-18 DIAGNOSIS — C50.812 MALIGNANT NEOPLASM OF OVERLAPPING SITES OF LEFT BREAST IN FEMALE, ESTROGEN RECEPTOR POSITIVE (HCC): Primary | ICD-10-CM

## 2020-06-18 DIAGNOSIS — Z17.0 MALIGNANT NEOPLASM OF OVERLAPPING SITES OF LEFT BREAST IN FEMALE, ESTROGEN RECEPTOR POSITIVE (HCC): Primary | ICD-10-CM

## 2020-06-18 RX ORDER — LETROZOLE 2.5 MG/1
2.5 TABLET, FILM COATED ORAL DAILY
COMMUNITY
Start: 2020-05-04 | End: 2022-06-08

## 2020-06-18 NOTE — PATIENT INSTRUCTIONS
..If you have any questions or concerns about today's appointment, the verbal and/or written instructions you were given for follow up care, please call our office at [phone]. [facility]  [id number] [place] [address] Road    [phone] office  [phone] fax      Appointment : Wednesday, June 24, 2020 at 11:30am with Dr. Alma Orr at [facility] located at [address] [place] [address]. [address], [address]   [phone]) [phone]      TAG Placement:___________________at Stefanie Tye located at [facility], [address] [facility] [place], [address] (T) [address] Address: [facility]. [address]     [phone]    Before Surgery Instructions:   1) You must have someone available to drive you to and from your procedure and stay with you for the first 24 hours. 2) It is very important that you have nothing to eat or drink after midnight the night before your surgery. This includes chewing gum or sucking on hard candy. Take only heart, blood pressure and cholesterol medications the morning of surgery with only a sip of water. 3) Please stop taking Plavix 10 days prior to your surgery. Stop taking Coumadin 5 days prior to your surgery. Stop taking all Aspirin or Aspirin containing products 7 days prior to your surgery. Stop taking Advil, Motrin, Aleve, and etc. 3 days prior to your surgery. 4) If you take any diabetic medications please consult with your primary care physician on how to take them on the day of your surgery  5) Please stop all Herbal products 2 weeks prior to your surgery. 6) Please arrive at the hospital 2 hours prior to your surgery, unless you have been otherwise instructed. Any required labs/urine drug screen/x-rays will be performed on day of surgery.   7) If you are of child bearing age you will have pregnancy test done the morning of your surgery as soon as you arrive. 8) Patients having an operation on their colon will be given a separate instruction sheet on their Bowel Prep. 9) For any pre-operative work up check in at the main entrance to [facility], and then go to Patient Registration. These studies are done on a walk in basis they are open from 7:00am to 5:00pm Monday through Friday. 10) Please wash your surgical site the morning of your surgery with antibacterial (i.e. Dial) soap and water. 11) You will be contacted by a hospital preadmission nurse approximately one week prior to scheduled surgery to discuss additional instructions, review your medications and scheduled COVID 19 testing prior to surgery. 12) You may be contacted to change your surgery time. At times this is necessary due to equipment, staffing needs or in the event of a cancellation. Please be advised it's your responsibility to notify our office of any changes to your healthcare coverage. Failure to notify our office of any changes to your health care coverage may result in denial of payment by your health insurance for all incurred services and you would be responsible for payment for all incurred services. Surgery Date and Time:  Tuesday, June 30, 2020 at 10:30am    Please check in at [facility] enter through the main entrance and go to patient registration located behind the information desk. Please check in by 8:30am the day of your surgery. After Surgery Instructions: You will need to be seen in the office for a follow-up visit 7-14 days after your surgery. Please call after you have had the procedure to make this appointment. Unless otherwise instructed, you may remove your outer bandage and shower 48 hours after your surgery. If you develop a fever greater than 101, have any significant drainage, bleeding, swelling and/or pus of the wound.  Please call our office immediately. You may contact Olive Deleon with any questions at [phone].

## 2020-06-18 NOTE — NURSE NAVIGATOR
Follow up visit with Dr. Rashi Russ. Patient accompanied by daughter, Aliza Finney. Patient is here today to schedule surgery for breast cancer. Patient has opted to have left lumpectomy with SLN bx and radiation therapy. Patient is scheduled to consult with rad/onc, Dr. Elvia Helms to discuss adjuvant radiation on 6/24/20. Iris Valiente has scheduled pt for surgery on 6/30/20. Surgical booklet discussed with patient, gift bag given to pt on prior appointment. All questions answered.

## 2020-06-18 NOTE — PROGRESS NOTES
Patient presents for follow up for  left ER/OH positive, HER negative invasive ductal carcinoma, s/p core biopsy 3/13/20.    1. Have you been to the ER, urgent care clinic since your last visit? Hospitalized since your last visit? No    2. Have you seen or consulted any other health care providers outside of the [facility] since your last visit? Include any pap smears or colon screening.  No

## 2020-06-18 NOTE — LETTER
6/18/2020 11:55 AM 
 
Patient:  Phong Ritchie YOB: 1940 Date of Visit: 6/18/2020 Arcenio Van MD 
[address] [place] [address] VIA In Basket Krish Akers DO 
[address] [facility] [address] [place] [address] VIA In Basket Dear MD Krish Portillo DO, 
 
 
I had the pleasure of seeing Ms. Gary Chester in my office today for her breast cancer. I am including a copy of my office visit today. If you have questions, please do not hesitate to call me. I look forward to following Ms. Dimitris Crisostomo along with you and will keep you updated as to her progress. Sincerely, Joselo Fleming MD

## 2020-06-18 NOTE — H&P (VIEW-ONLY)
Breast Cancer Ms. 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 The area of concern was identified on screening imaging. She denied palpable mass. She denied nipple discharge. She denied breast pain. There family history of breast cancer. A sister had pancreatic cancer. Her most recent previous mammogram was November 2015. She already followed with Dr. Bunny Duverney for her monoclonal gammopathy. Genetic testing did not show any clinically significant mutation. She has an APC variant of uncertain significance. Due to the [place] pandemic, based on recommendations issued from medical and government sources, she was started on upfront aromatase inhibitor with delay of surgery until resources are more available and non-emergent surgery permitted. She was started on letrozole per Dr. Bunny Duverney. Breast/GYN history:   
OB History No obstetric history on file. Past Medical History:  
Diagnosis Date  Allergic rhinitis  Breast cancer ([facility] [address] 03/2020 Dr Vandana Romo; ER/UT+, HER-2 neg infiltrating ductal ca 2 sites LEFT breast; Dr Bunny Duverney  Colon adenoma 1/15 Dr Estela Pitt; polyp 2006 Dr Meño Lopez  COPD   
 on films  DJD (degenerative joint disease)  Dyslipidemia  FHx: heart disease  GERD (gastroesophageal reflux disease) s/p dilation Dr. Meño Lopez 2006; [facility] and esophagitis 1/15 Dr Estela Pitt  Monoclonal gammopathy 2011 Dr. Claribel Moreno  Osteoporosis Dr. Torrie Chisholm, t score -0.6 spine, -2.7 hip   
 took forteo 2012 to 2014 Dr Torrie Chisholm  Recurrent BCC (basal cell carcinoma) Dr. Shelley Mart  Tobacco dependence syndrome Past Surgical History:  
Procedure Laterality Date  BREAST SURGERY PROCEDURE UNLISTED  1992  
 neg RIGHT breast biopsy Dr. Kristie Hoff  HX COLONOSCOPY Dr. Meño Lopez 2006 polyp; Dr Estela Pitt 1/15 adenoma  HX HYSTERECTOMY  1970s for precancerous lesions in the cervix  HX ORTHOPAEDIC    
 DEXA t score 0.32 spine, -2.28 hip FRAX 5.3 hip Dr José Miguel Ibrahim (12/13); spine 0.68, hip -2.51 w FRAX 7.2 (12/15)  HX ORTHOPAEDIC  05/2017 RIGHT CTS release and thumb surgery Dr Carolina Branham  NEUROLOGICAL PROCEDURE UNLISTED    
 s/p c spine surgery [facility] 2010?  NEUROLOGICAL PROCEDURE UNLISTED  12/2017 [facility] 30/30  IN BIOPSY OF BREAST, NEEDLE CORE Left 03/2020  
 seeing Dr Olesya Mejias Current Outpatient Medications on File Prior to Visit Medication Sig Dispense Refill  letrozole (FEMARA) 2.5 mg tablet Take 2.5 mg by mouth daily.  amLODIPine (NORVASC) 5 mg tablet Take 1 Tab by mouth daily. 90 Tab 3  
 atorvastatin (LIPITOR) 80 mg tablet Take 1 Tab by mouth daily. 90 Tab 3  
 ezetimibe (Zetia) 10 mg tablet Take 1 Tab by mouth daily. 90 Tab 3  
 tiotropium (Spiriva with HandiHaler) 18 mcg inhalation capsule Take 1 Cap by inhalation daily. 90 Cap 3  
 CALCIUM CARBONATE (CALCIUM 500 PO) Take  by mouth daily.  cyanocobalamin 1,000 mcg tablet Take 1,000 mcg by mouth daily.  albuterol (PROVENTIL HFA, VENTOLIN HFA, PROAIR HFA) 90 mcg/actuation inhaler Take 1 Puff by inhalation every six (6) hours as needed for Wheezing. 1 Inhaler 5  
 fluticasone (FLONASE) 50 mcg/actuation nasal spray 2 Sprays by Both Nostrils route daily. (Patient taking differently: 2 Sprays by Both Nostrils route as needed.) 3 Bottle 3  Cholecalciferol, Vitamin D3, (VITAMIN D3) 1,000 unit Cap Take 1,000 Units by mouth daily.  aspirin delayed-release 81 mg tablet Take 81 mg by mouth daily.  denosumab (PROLIA) 60 mg/mL injection 60 mg by SubCUTAneous route every 6 months. No current facility-administered medications on file prior to visit. Allergies Allergen Reactions  Penicillins Rash and Hives Social History Tobacco Use  Smoking status: Current Every Day Smoker Packs/day: 1.50 Years: 50.00 Pack years: 75.00 Types: Cigarettes  Smokeless tobacco: Never Used Substance Use Topics  Alcohol use: No  
 Drug use: No  
 
 
Family History Problem Relation Age of Onset  Heart Attack Father  Kidney Disease Mother ADPKD  Diabetes Sister  Cancer Sister   
     leukemia ROS: positives are bolded General: fevers, chills, night sweats, fatigue, weight loss, weight gain GI: nausea, vomiting, abdominal pain, change in bowel habits, hematochezia, melena, GERD Integ: dermatitis, abnormal moles HEENT: visual changes, vertigo, epistaxis, dysphagia, hoarseness Cardiac: chest pain, palpitations, HTN, edema, varicosities Resp: cough, shortness of breath, wheezing, hemoptysis, snoring, reactive airway disease : hematuria, dysuria, frequency, urgency, nocturia, stress urinary incontinence MSK: weakness, joint pain, arthritis Endocrine:  thyroid disease, polyuria, polydipsia, polyphagia, poor wound healing, heat intolerance, cold intolerance Lymph/Heme: anemia, bruising, history of blood transfusions Neuro: dizziness, headache, fainting, seizures, stroke Psych: anxiety, depression Physical Exam: 
Visit Vitals /83 (BP 1 Location: Left arm, BP Patient Position: Sitting) Pulse 91 Temp 97.2 °F (36.2 °C) (Oral) Resp 20 Ht 5' 1\" (1.549 m) Wt 54.8 kg (120 lb 12.8 oz) SpO2 98% BMI 22.82 kg/m² Gen:  No distress Head: normocephalic, atraumatic Mouth: Clear, no overt lesions, oral mucosa pink and moist 
Neck: supple, no masses, no adenopathy, trachea midline Resp: clear bilaterally Cardio: Regular rate and rhythm Abdomen: soft, nontender, nondistended Extremeties: warm, well-perfused Neuro: sensation and strength grossly intact and symmetrical 
Psych: alert and oriented to person, place and time Breasts:  
Right: Examined in both the supine and upright positions.   There was no supraclavicular, infraclavicular, or axillary lympadenopathy. There were no dominant masses, no skin changes, no asymmetry identified Left: Examined in both the supine and upright positions. There was no supraclavicular, infraclavicular, or axillary lympadenopathy. There were no skin changes  Fullness/nodularity upper outer breast visible on inspection and exam, decreased in size Imaging: 
3/10/20 left mammo/ultrasound (HV) Irregular hypoechoic masses at the left breast the 11:30 position, 3 cm from the 
nipple and 12:30 position, 3 cm from nipple are highly suspicious. Recommend 
ultrasound-guided biopsy of both masses. 
  
Findings were discussed with the patient and her daughter at the time of the 
exam.  
  
BIRADS 5:  Highly suggestive of malignancy:  appropriate action should be taken 
  
Breast Density B: There are scattered areas of fibroglandular density. 3/4/20 bilateral mammo (HV) 2 spiculated masses in the left breast including upper inner quadrant and upper 
outer quadrant. 2-D 3-D CC and MLO spot compression and mediolateral views 
recommended along with ultrasound. Ultrasound of the left axilla recommended. 
  
BI-RADS Assessment Category 0: Incomplete. Need additional imaging evaluation.  
  
Density B: There are scattered areas of fibroglandular density. Pathology: 
3/13/20 A: LEFT BREAST, 11:30, CORE NEEDLE BIOPSY-  
INFILTRATING DUCTAL ADENOCARCINOMA, NUCLEAR GRADE 2. Estrogen Receptor (ER): Positive (>90% of cells stain with strong intensity). Progesterone Receptor (PgR): Positive (>90 % of cells stain with moderate intensity). Her-2 (IHC Method): Negative (score 1+) Percentage of cells with intense complete membrane stainin%   
B: LEFT BREAST, 12:30, CORE NEEDLE BIOPSY INFILTRATING DUCTAL ADENOCARCINOMA, NUCLEAR GRADE 2. Estrogen Receptor (ER): Positive (>90% of cells stain with strong intensity). Progesterone Receptor (PgR): Positive (>90 % of cells stain with strong intensity). Her-2 (IHC Method): Negative (score 1+) Percentage of cells with intense complete membrane stainin% . Impression: 
Patient Active Problem List  
Diagnosis Code  Colon polyps Dr. Codie Greene 2006 K63.5  Osteoporosis Dr. Cindy Washburn M81.0  Monoclonal gammopathy  Dr. Milagro Ballard D47.2  COPD on films J44.9  Dyslipidemia E78.5  Arthritis, degenerative M19.90  Gastroesophageal reflux disease without esophagitis K21.9  Advance directive discussed with patient Z70.80  
 Tobacco dependence syndrome F17.200  Primary hypertension I10  
 Cancer of overlapping sites of left female breast [facility]) C49.80  
  
 
  79year old woman with left ER/WA positive, HER negative invasive ductal carcinoma, s/p core biopsy 3/13/20, now taking neoadjuvant letrozole. We discussed that there are many options for treatment of breast cancer. Surgery, chemotherapy, radiation and hormone therapy are all tools that may be used in the treatment of breast cancer. For each patient, we determine which will be most beneficial based on her individual set of circumstances. For some patients all four treatment categories will be recommended. For others one or more of these options are not appropriate. We began by reviewing her imaging thus far as well as pathology in detail. Chemotherapy was addressed first.  Chemotherapy is systemic treatment aimed largely to decrease chance of spread or recurrence of cancer. It is administered by a medical oncologist.  Often the decision for chemotherapy is made after final pathology. I have recommended referral to medical oncology for additional information regarding chemotherapy. Regarding surgery, there are two main options, lumpectomy or mastectomy. We discussed both in detail.   Overall survival and distant recurrence rates are the same. The decision is generally a personal decision more so than a medical one. Lumpectomy is also known as breast conservation surgery or partial mastectomy. The goal is to remove the area of concern as well as surrounding area of uninvolved tissue (\"clear margins\"). Radiation is almost always recommended with lumpectomy to allow for acceptable local recurrence rates. Local recurrence rates are approximately 6% after lumpectomy with radiation. Risks, benefits and options were discussed in detail to include, but not limited to, bleeding, infection, risks of anesthesia, injury to surrounding structures and other unforeseen events such as stroke, heart attack or death. Mastectomy was then addressed. With mastectomy, almost all of the breast tissue is removed. We are not able to remove 100% of the breast tissue. The risk of local recurrence is approximately 2-4% after mastectomy. The overlying skin is generally numb. Most often, the numbness is permanent. Mastectomy can be performed with or without reconstruction. The reconstruction is performed by the plastic surgeon. Commonly it is a multi-step process with placement of tissue expanders as the first step. If she is interested in reconstruction, I will refer her to plastic surgery. Often we are able to perform a nipple sparing mastectomy with reconstruction. The reconstructed breast differs in many ways from the native breast.  The goal is that in a bra or clothing, no one can tell she has had a mastectomy. Radiation generally is not needed after mastectomy. There are some circumstances, usually based on size, margins, local extension or lymph node status, where post-mastectomy radiation is recommended.    Risks, benefits and options were discussed in detail to include, but not limited to, bleeding, infection, risks of anesthesia, injury to surrounding structures and other unforeseen events such as stroke, heart attack or death. Routine screening mammogram is not recommended after mastectomy. As she is clinically node negative, she is a candidate for sentinel node biopsy. We discussed this procedure is a targeted sampling of the axillary lymph nodes to allow for staging of her disease. She will be injected with radioactive isotope as well as blue dye to allow for mapping and removal of the sentinel nodes. By removing only the sentinel nodes and not performing axillary node dissection, she has a decreased risk of lymphedema (arm swelling) and nerve injury. We did specifically discuss that both of these risks still exist.   Risks, benefits and options were discussed in detail to include, but not limited to, bleeding, infection, risks of anesthesia, injury to surrounding structures and other unforeseen events such as stroke, heart attack or death. If a significant amount of cancer is noted in her lymph nodes, an axillary lymph node dissection may be recommended. In this procedure more, but not all, of the lymph nodes under the arm are removed. The chance of both lymphedema and nerve injury are increased with axillary node dissection compared to sentinel node biopsy. I generally recommend referral to physical therapy and a lymphedema specialist if an axillary node dissection is performed. We discussed radiation. Radiation is a local therapy aimed to decrease the chance of local recurrence. It is administered under the direction of a radiation oncologist.  Radiation is almost always recommended with lumpectomy. It generally is not needed after mastectomy. There are some circumstances, usually based on size, margins, local extension or lymph node status, where post-mastectomy radiation is recommended. Most commonly it is administered five days a week for up to seven weeks.   Most of the side effects, with the exception of fatigue, are local.   
 
 Anti-hormone or hormone blocking therapy is used in hormone sensitive, estrogen receptor positive breast cancer. It is generally recommended for 5-10 years. There are two categories of hormone blocking medications. Tamoxifen is a selective estrogen reuptake modulator (SERM). There are several aromatase inhibitors. These medications are generally prescribed by a medical oncologist.   
 
Tobacco cessation again recommended. After discussing the above, Ms. Micaela Griffinschner to proceed with left partial mastectomy with two site localization and sentinel node biopsy. All questions were answered. She was asked to call with any additional questions or concerns.

## 2020-06-18 NOTE — TELEPHONE ENCOUNTER
Left voicemail message for Ms. Jabier Young daughter to inform of appointment on Tuesday, June 23, 2020 at 10:00am at [facility] for TAG placement.

## 2020-06-18 NOTE — PROGRESS NOTES
Breast Cancer     Ms. 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 The area of concern was identified on screening imaging. She denied palpable mass. She denied nipple discharge. She denied breast pain. There family history of breast cancer. A sister had pancreatic cancer. Her most recent previous mammogram was November 2015. She already followed with Dr. Mary Gibson for her monoclonal gammopathy. Genetic testing did not show any clinically significant mutation. She has an APC variant of uncertain significance. Due to the [place] pandemic, based on recommendations issued from medical and government sources, she was started on upfront aromatase inhibitor with delay of surgery until resources are more available and non-emergent surgery permitted. She was started on letrozole per Dr. Mary Gibson. Breast/GYN history:    OB History    No obstetric history on file.           Past Medical History:   Diagnosis Date    Allergic rhinitis     Breast cancer ([facility] [address] 03/2020    Dr Frias Viviana; ER/ME+, HER-2 neg infiltrating ductal ca 2 sites LEFT breast; Dr Chahal Beat adenoma     1/15 Dr Pedro Crane; polyp 2006 Dr Shoaib Lang COPD     on films    DJD (degenerative joint disease)     Dyslipidemia     FHx: heart disease     GERD (gastroesophageal reflux disease)     s/p dilation Dr. Katt Cavazos 2006; [place] and esophagitis 1/15 Dr Pedro Crane    Monoclonal gammopathy 2011 Dr. Coty Chris Osteoporosis Dr. Froylan Chery, t score -0.6 spine, -2.7 hip     took forteo 2012 to 2014 Dr Herring    Recurrent BCC (basal cell carcinoma)     Dr. Kiesha Rand dependence syndrome        Past Surgical History:   Procedure Laterality Date   [address]    neg RIGHT breast biopsy Dr. David Cavazos 2006 polyp; Dr Pedro Crane 1/15 adenoma    HX HYSTERECTOMY  1970s    for precancerous lesions in the cervix    HX ORTHOPAEDIC      DEXA t score 0.32 spine, -2.28 hip FRAX 5.3 hip Dr Liliana Cruz (12/13); spine 0.68, hip -2.51 w FRAX 7.2 (12/15)    HX ORTHOPAEDIC  05/2017    RIGHT CTS release and thumb surgery Dr Sima Shelton      s/p c spine surgery [facility] 2010?  NEUROLOGICAL PROCEDURE UNLISTED  12/2017    MMSE 30/30    [facility] BIOPSY OF BREAST, NEEDLE CORE Left 03/2020    seeing Dr María Elena Townsend       Current Outpatient Medications on File Prior to Visit   Medication Sig Dispense Refill    letrozole [facility]) 2.5 mg tablet Take 2.5 mg by mouth daily.  amLODIPine (NORVASC) 5 mg tablet Take 1 Tab by mouth daily. 90 Tab 3    atorvastatin (LIPITOR) 80 mg tablet Take 1 Tab by mouth daily. 90 Tab 3    ezetimibe (Zetia) 10 mg tablet Take 1 Tab by mouth daily. 90 Tab 3    tiotropium (Spiriva with HandiHaler) 18 mcg inhalation capsule Take 1 Cap by inhalation daily. 90 Cap 3    CALCIUM CARBONATE (CALCIUM 500 PO) Take  by mouth daily.  cyanocobalamin 1,000 mcg tablet Take 1,000 mcg by mouth daily.  albuterol (PROVENTIL HFA, VENTOLIN HFA, PROAIR HFA) 90 mcg/actuation inhaler Take 1 Puff by inhalation every six (6) hours as needed for Wheezing. 1 Inhaler 5    fluticasone (FLONASE) 50 mcg/actuation nasal spray 2 Sprays by Both Nostrils route daily. (Patient taking differently: 2 Sprays by Both Nostrils route as needed.) 3 Bottle 3    Cholecalciferol, Vitamin D3, (VITAMIN D3) 1,000 unit Cap Take 1,000 Units by mouth daily.  aspirin delayed-release 81 mg tablet Take 81 mg by mouth daily.  denosumab (PROLIA) 60 mg/mL injection 60 mg by SubCUTAneous route every 6 months. No current facility-administered medications on file prior to visit.         Allergies   Allergen Reactions    Penicillins Rash and Hives       Social History     Tobacco Use    Smoking status: Current Every Day Smoker     Packs/day: 1.50     Years: 50.00     Pack years: 75.00 Types: Cigarettes    Smokeless tobacco: Never Used   Substance Use Topics    Alcohol use: No    Drug use: No       Family History   Problem Relation Age of Onset    Heart Attack Father     Kidney Disease Mother         ADPKD    Diabetes Sister     Cancer Sister         leukemia         ROS: positives are bolded  General: fevers, chills, night sweats, fatigue, weight loss, weight gain  GI: nausea, vomiting, abdominal pain, change in bowel habits, hematochezia, melena, GERD  Integ: dermatitis, abnormal moles  HEENT: visual changes, vertigo, epistaxis, dysphagia, hoarseness  Cardiac: chest pain, palpitations, HTN, edema, varicosities  Resp: cough, shortness of breath, wheezing, hemoptysis, snoring, reactive airway disease  : hematuria, dysuria, frequency, urgency, nocturia, stress urinary incontinence   MSK: weakness, joint pain, arthritis  Endocrine:  thyroid disease, polyuria, polydipsia, polyphagia, poor wound healing, heat intolerance, cold intolerance  Lymph/Heme: anemia, bruising, history of blood transfusions  Neuro: dizziness, headache, fainting, seizures, stroke  Psych: anxiety, depression    Physical Exam:  Visit Vitals  /83 (BP 1 Location: Left arm, BP Patient Position: Sitting)   Pulse 91   Temp 97.2 °F (36.2 °C) (Oral)   Resp 20   Ht 5' 1\" (1.549 m)   Wt 54.8 kg (120 lb 12.8 oz)   SpO2 98%   BMI 22.82 kg/m²       Gen:  No distress  Head: normocephalic, atraumatic  Mouth: Clear, no overt lesions, oral mucosa pink and moist  Neck: supple, no masses, no adenopathy, trachea midline  Resp: clear bilaterally  Cardio: Regular rate and rhythm  Abdomen: soft, nontender, nondistended  Extremeties: warm, well-perfused  Neuro: sensation and strength grossly intact and symmetrical  Psych: alert and oriented to person, place and time  Breasts:   Right: Examined in both the supine and upright positions. There was no supraclavicular, infraclavicular, or axillary lympadenopathy.    There were no dominant masses, no skin changes, no asymmetry identified   Left: Examined in both the supine and upright positions. There was no supraclavicular, infraclavicular, or axillary lympadenopathy. There were no skin changes  Fullness/nodularity upper outer breast visible on inspection and exam, decreased in size      Imaging:  3/10/20 left mammo/ultrasound ([facility])  Irregular hypoechoic masses at the left breast the 11:30 position, 3 cm from the  nipple and 12:30 position, 3 cm from nipple are highly suspicious. Recommend  ultrasound-guided biopsy of both masses.     Findings were discussed with the patient and her daughter at the time of the  exam.      BIRADS 5:  Highly suggestive of malignancy:  appropriate action should be taken     Breast Density B: There are scattered areas of fibroglandular density. 3/4/20 bilateral mammo ([facility])  2 spiculated masses in the left breast including upper inner quadrant and upper  outer quadrant. 2-D 3-D CC and MLO spot compression and mediolateral views  recommended along with ultrasound. Ultrasound of the left axilla recommended.     BI-RADS Assessment Category 0: Incomplete. Need additional imaging evaluation.      Density B: There are scattered areas of fibroglandular density. Pathology:  3/13/20   A: LEFT BREAST, 11:30, CORE NEEDLE BIOPSY-   INFILTRATING DUCTAL ADENOCARCINOMA, NUCLEAR GRADE 2. Estrogen Receptor (ER): Positive (>90% of cells stain with strong intensity). Progesterone Receptor (PgR): Positive (>90 % of cells stain with moderate intensity). Her-2 (IHC Method): Negative (score 1+)   Percentage of cells with intense complete membrane stainin%    B: LEFT BREAST, 12:30, CORE NEEDLE BIOPSY-   INFILTRATING DUCTAL ADENOCARCINOMA, NUCLEAR GRADE 2. Estrogen Receptor (ER): Positive (>90% of cells stain with strong intensity). Progesterone Receptor (PgR): Positive (>90 % of cells stain with strong intensity).    Her-2 (IHC Method): Negative (score 1+)   Percentage of cells with intense complete membrane stainin% . Impression:  Patient Active Problem List   Diagnosis Code    Colon polyps  [facility] 2006 K63.5    Osteoporosis Dr. Maria M Rojas M81.0    Monoclonal gammopathy  Dr. Corey Proctor D47.2    COPD on films J44.9    Dyslipidemia E78.5    Arthritis, degenerative M19.90    Gastroesophageal reflux disease without esophagitis K21.9    Advance directive discussed with patient Z71.89    Tobacco dependence syndrome F17.200    Primary hypertension I10    Cancer of overlapping sites of left female breast [facility]) C49.80          79year old woman with left ER/MS positive, HER negative invasive ductal carcinoma, s/p core biopsy 3/13/20, now taking neoadjuvant letrozole. We discussed that there are many options for treatment of breast cancer. Surgery, chemotherapy, radiation and hormone therapy are all tools that may be used in the treatment of breast cancer. For each patient, we determine which will be most beneficial based on her individual set of circumstances. For some patients all four treatment categories will be recommended. For others one or more of these options are not appropriate. We began by reviewing her imaging thus far as well as pathology in detail. Chemotherapy was addressed first.  Chemotherapy is systemic treatment aimed largely to decrease chance of spread or recurrence of cancer. It is administered by a medical oncologist.  Often the decision for chemotherapy is made after final pathology. I have recommended referral to medical oncology for additional information regarding chemotherapy. Regarding surgery, there are two main options, lumpectomy or mastectomy. We discussed both in detail. Overall survival and distant recurrence rates are the same. The decision is generally a personal decision more so than a medical one. Lumpectomy is also known as breast conservation surgery or partial mastectomy.  The goal is to remove the area of concern as well as surrounding area of uninvolved tissue (\"clear margins\"). Radiation is almost always recommended with lumpectomy to allow for acceptable local recurrence rates. Local recurrence rates are approximately 6% after lumpectomy with radiation. Risks, benefits and options were discussed in detail to include, but not limited to, bleeding, infection, risks of anesthesia, injury to surrounding structures and other unforeseen events such as stroke, heart attack or death. Mastectomy was then addressed. With mastectomy, almost all of the breast tissue is removed. We are not able to remove 100% of the breast tissue. The risk of local recurrence is approximately 2-4% after mastectomy. The overlying skin is generally numb. Most often, the numbness is permanent. Mastectomy can be performed with or without reconstruction. The reconstruction is performed by the plastic surgeon. Commonly it is a multi-step process with placement of tissue expanders as the first step. If she is interested in reconstruction, I will refer her to plastic surgery. Often we are able to perform a nipple sparing mastectomy with reconstruction. The reconstructed breast differs in many ways from the native breast.  The goal is that in a bra or clothing, no one can tell she has had a mastectomy. Radiation generally is not needed after mastectomy. There are some circumstances, usually based on size, margins, local extension or lymph node status, where post-mastectomy radiation is recommended. Risks, benefits and options were discussed in detail to include, but not limited to, bleeding, infection, risks of anesthesia, injury to surrounding structures and other unforeseen events such as stroke, heart attack or death. Routine screening mammogram is not recommended after mastectomy. As she is clinically node negative, she is a candidate for sentinel node biopsy.  We discussed this procedure is a targeted sampling of the axillary lymph nodes to allow for staging of her disease. She will be injected with radioactive isotope as well as blue dye to allow for mapping and removal of the sentinel nodes. By removing only the sentinel nodes and not performing axillary node dissection, she has a decreased risk of lymphedema (arm swelling) and nerve injury. We did specifically discuss that both of these risks still exist.   Risks, benefits and options were discussed in detail to include, but not limited to, bleeding, infection, risks of anesthesia, injury to surrounding structures and other unforeseen events such as stroke, heart attack or death. If a significant amount of cancer is noted in her lymph nodes, an axillary lymph node dissection may be recommended. In this procedure more, but not all, of the lymph nodes under the arm are removed. The chance of both lymphedema and nerve injury are increased with axillary node dissection compared to sentinel node biopsy. I generally recommend referral to physical therapy and a lymphedema specialist if an axillary node dissection is performed. We discussed radiation. Radiation is a local therapy aimed to decrease the chance of local recurrence. It is administered under the direction of a radiation oncologist.  Radiation is almost always recommended with lumpectomy. It generally is not needed after mastectomy. There are some circumstances, usually based on size, margins, local extension or lymph node status, where post-mastectomy radiation is recommended. Most commonly it is administered five days a week for up to seven weeks. Most of the side effects, with the exception of fatigue, are local.      Anti-hormone or hormone blocking therapy is used in hormone sensitive, estrogen receptor positive breast cancer. It is generally recommended for 5-10 years. There are two categories of hormone blocking medications. Tamoxifen is a selective estrogen reuptake modulator (SERM).   There are several aromatase inhibitors. These medications are generally prescribed by a medical oncologist.      Tobacco cessation again recommended. After discussing the above, Ms. Davies Fredy to proceed with left partial mastectomy with two site localization and sentinel node biopsy. All questions were answered. She was asked to call with any additional questions or concerns.

## 2020-06-23 ENCOUNTER — HOSPITAL ENCOUNTER (OUTPATIENT)
Dept: MAMMOGRAPHY | Age: 80
Discharge: HOME | End: 2020-06-23
Attending: SURGERY
Payer: MEDICARE

## 2020-06-23 DIAGNOSIS — N63.0 LUMP OR MASS IN BREAST: ICD-10-CM

## 2020-06-23 DIAGNOSIS — R92.8 ABNORMAL MAMMOGRAM: ICD-10-CM

## 2020-06-23 DIAGNOSIS — C50.919 BREAST CANCER (HCC): ICD-10-CM

## 2020-06-23 PROCEDURE — 19282 PERQ DEVICE BREAST EA IMAG: CPT

## 2020-06-23 PROCEDURE — 19281 PERQ DEVICE BREAST 1ST IMAG: CPT

## 2020-06-24 ENCOUNTER — HOSPITAL ENCOUNTER (OUTPATIENT)
Dept: RADIATION THERAPY | Age: 80
Discharge: HOME | End: 2020-06-24
Payer: MEDICARE

## 2020-06-24 DIAGNOSIS — Z17.0 MALIGNANT NEOPLASM OF OVERLAPPING SITES OF LEFT BREAST IN FEMALE, ESTROGEN RECEPTOR POSITIVE (HCC): Primary | ICD-10-CM

## 2020-06-24 DIAGNOSIS — C50.812 MALIGNANT NEOPLASM OF OVERLAPPING SITES OF LEFT BREAST IN FEMALE, ESTROGEN RECEPTOR POSITIVE (HCC): Primary | ICD-10-CM

## 2020-06-24 PROCEDURE — 99211 OFF/OP EST MAY X REQ PHY/QHP: CPT

## 2020-06-25 ENCOUNTER — HOSPITAL ENCOUNTER (OUTPATIENT)
Dept: LAB | Age: 80
Discharge: HOME | End: 2020-06-25
Payer: MEDICARE

## 2020-06-25 ENCOUNTER — HOSPITAL ENCOUNTER (OUTPATIENT)
Dept: PREADMISSION TESTING | Age: 80
Discharge: HOME | End: 2020-06-25
Payer: MEDICARE

## 2020-06-25 DIAGNOSIS — Z01.818 PREOPERATIVE TESTING: ICD-10-CM

## 2020-06-25 DIAGNOSIS — Z17.0 MALIGNANT NEOPLASM OF OVERLAPPING SITES OF LEFT BREAST IN FEMALE, ESTROGEN RECEPTOR POSITIVE (HCC): ICD-10-CM

## 2020-06-25 DIAGNOSIS — C50.812 MALIGNANT NEOPLASM OF OVERLAPPING SITES OF LEFT BREAST IN FEMALE, ESTROGEN RECEPTOR POSITIVE (HCC): ICD-10-CM

## 2020-06-25 DIAGNOSIS — E78.5 DYSLIPIDEMIA: ICD-10-CM

## 2020-06-25 LAB
ALBUMIN SERPL-MCNC: 4.1 G/DL (ref 3.4–5)
ALBUMIN/GLOB SERPL: 1.2 {RATIO} (ref 0.8–1.7)
ALP SERPL-CCNC: 80 U/L (ref 45–117)
ALT SERPL-CCNC: 24 U/L (ref 13–56)
ANION GAP SERPL CALC-SCNC: 8 MMOL/L (ref 3–18)
AST SERPL-CCNC: 19 U/L (ref 10–38)
BILIRUB SERPL-MCNC: 0.4 MG/DL (ref 0.2–1)
BUN SERPL-MCNC: 15 MG/DL (ref 7–18)
BUN/CREAT SERPL: 16 (ref 12–20)
CALCIUM SERPL-MCNC: 9.8 MG/DL (ref 8.5–10.1)
CHLORIDE SERPL-SCNC: 98 MMOL/L (ref 100–111)
CHOLEST SERPL-MCNC: 164 MG/DL
CO2 SERPL-SCNC: 28 MMOL/L (ref 21–32)
CREAT SERPL-MCNC: 0.92 MG/DL (ref 0.6–1.3)
GLOBULIN SER CALC-MCNC: 3.3 G/DL (ref 2–4)
GLUCOSE SERPL-MCNC: 90 MG/DL (ref 74–99)
HDLC SERPL-MCNC: 48 MG/DL (ref 40–60)
HDLC SERPL: 3.4 {RATIO} (ref 0–5)
LDLC SERPL CALC-MCNC: 97.8 MG/DL (ref 0–100)
LIPID PROFILE,FLP: NORMAL
POTASSIUM SERPL-SCNC: 4.9 MMOL/L (ref 3.5–5.5)
PROT SERPL-MCNC: 7.4 G/DL (ref 6.4–8.2)
SODIUM SERPL-SCNC: 134 MMOL/L (ref 136–145)
TRIGL SERPL-MCNC: 91 MG/DL (ref ?–150)
VLDLC SERPL CALC-MCNC: 18.2 MG/DL

## 2020-06-25 PROCEDURE — 87635 SARS-COV-2 COVID-19 AMP PRB: CPT

## 2020-06-25 PROCEDURE — 36415 COLL VENOUS BLD VENIPUNCTURE: CPT

## 2020-06-25 PROCEDURE — 80053 COMPREHEN METABOLIC PANEL: CPT

## 2020-06-25 PROCEDURE — 80061 LIPID PANEL: CPT

## 2020-06-25 PROCEDURE — 93005 ELECTROCARDIOGRAM TRACING: CPT

## 2020-06-26 ENCOUNTER — HOSPITAL ENCOUNTER (OUTPATIENT)
Dept: PREADMISSION TESTING | Age: 80
Discharge: HOME | End: 2020-06-26
Payer: MEDICARE

## 2020-06-26 ENCOUNTER — HOSPITAL ENCOUNTER (OUTPATIENT)
Dept: LAB | Age: 80
Discharge: HOME | End: 2020-06-26
Payer: MEDICARE

## 2020-06-26 DIAGNOSIS — Z17.0 MALIGNANT NEOPLASM OF OVERLAPPING SITES OF LEFT BREAST IN FEMALE, ESTROGEN RECEPTOR POSITIVE (HCC): ICD-10-CM

## 2020-06-26 DIAGNOSIS — C50.812 MALIGNANT NEOPLASM OF OVERLAPPING SITES OF LEFT BREAST IN FEMALE, ESTROGEN RECEPTOR POSITIVE (HCC): ICD-10-CM

## 2020-06-26 LAB
ATRIAL RATE: 85 BPM
BASOPHILS # BLD: 0 K/UL (ref 0–0.1)
BASOPHILS NFR BLD: 0 % (ref 0–2)
CALCULATED R AXIS, ECG10: 27 DEGREES
CALCULATED T AXIS, ECG11: 68 DEGREES
DIAGNOSIS, 93000: NORMAL
DIFFERENTIAL METHOD BLD: NORMAL
EOSINOPHIL # BLD: 0 K/UL (ref 0–0.4)
EOSINOPHIL NFR BLD: 0 % (ref 0–5)
ERYTHROCYTE [DISTWIDTH] IN BLOOD BY AUTOMATED COUNT: 13.6 % (ref 11.6–14.5)
HCT VFR BLD AUTO: 44.3 % (ref 35–45)
HGB BLD-MCNC: 14.9 G/DL (ref 12–16)
LYMPHOCYTES # BLD: 1.7 K/UL (ref 0.9–3.6)
LYMPHOCYTES NFR BLD: 22 % (ref 21–52)
MCH RBC QN AUTO: 30.5 PG (ref 24–34)
MCHC RBC AUTO-ENTMCNC: 33.6 G/DL (ref 31–37)
MCV RBC AUTO: 90.6 FL (ref 74–97)
MONOCYTES # BLD: 0.5 K/UL (ref 0.05–1.2)
MONOCYTES NFR BLD: 7 % (ref 3–10)
NEUTS SEG # BLD: 5.7 K/UL (ref 1.8–8)
NEUTS SEG NFR BLD: 71 % (ref 40–73)
P-R INTERVAL, ECG05: 106 MS
PLATELET # BLD AUTO: 285 K/UL (ref 135–420)
PMV BLD AUTO: 10.1 FL (ref 9.2–11.8)
Q-T INTERVAL, ECG07: 378 MS
QRS DURATION, ECG06: 74 MS
QTC CALCULATION (BEZET), ECG08: 449 MS
RBC # BLD AUTO: 4.89 M/UL (ref 4.2–5.3)
VENTRICULAR RATE, ECG03: 85 BPM
WBC # BLD AUTO: 7.9 K/UL (ref 4.6–13.2)

## 2020-06-26 PROCEDURE — 85025 COMPLETE CBC W/AUTO DIFF WBC: CPT

## 2020-06-26 PROCEDURE — 36415 COLL VENOUS BLD VENIPUNCTURE: CPT

## 2020-06-26 PROCEDURE — 93005 ELECTROCARDIOGRAM TRACING: CPT

## 2020-06-27 LAB — SARS-COV-2, COV2NT: NOT DETECTED

## 2020-06-29 ENCOUNTER — ANESTHESIA EVENT (OUTPATIENT)
Dept: SURGERY | Age: 80
End: 2020-06-29
Payer: MEDICARE

## 2020-06-29 NOTE — PERIOP NOTES
PAT - SURGICAL PRE-ADMISSION INSTRUCTIONS    NAME:  Aura Mohamud                                                          TODAY'S DATE:  6/29/2020    SURGERY DATE:  6/30/2020                                  SURGERY ARRIVAL TIME:   0830 PER OFFICE, TBV    1. Do NOT eat or drink anything, including candy or gum, after MIDNIGHT on 6/29/20 , unless you have specific instructions from your Surgeon or Anesthesia Provider to do so. 2. No smoking on the day of surgery. 3. No alcohol 24 hours prior to the day of surgery. 4. No recreational drugs for one week prior to the day of surgery. 5. Leave all valuables, including money/purse, at home. 6. Remove all jewelry, nail polish, makeup (including mascara); no lotions, powders, deodorant, or perfume/cologne/after shave. 7. Glasses/Contact lenses and Dentures may be worn to the hospital.  They will be removed prior to surgery. 8. Call your doctor if symptoms of a cold or illness develop within 24 ours prior to surgery. 9. AN ADULT MUST DRIVE YOU HOME AFTER OUTPATIENT SURGERY. 10. If you are having an OUTPATIENT procedure, please make arrangements for a responsible adult to be with you for 24 hours after your surgery. 11. If you are admitted to the hospital, you will be assigned to a bed after surgery is complete. Normally a family member will not be able to see you until you are in your assigned bed. 15. Family is encouraged to accompany you to the hospital.  We ask visitors in the treatment area to be limited to ONE person at a time to ensure patient privacy. EXCEPTIONS WILL BE MADE AS NEEDED. 15. Children under 12 are discouraged from entering the treatment area and need to be supervised by an adult when in the waiting room. Special Instructions:    Bring inhaler. , Take these medications the morning of surgery with a sip of water:  AMLODIPINE, ZETIA,  SPIRIVA, STOP anticoagulants AT LEAST 1 WEEK PRIOR to your surgery or, follow other MD instructions: ASPIRIN    Patient Prep:    shower with anti-bacterial soap    These surgical instructions were reviewed with PATIENT & DAUGHTER during the PAT PHONE CALL. Directions: On the morning of surgery, please go to the MAIN ENTRANCE. Sign in at the Registration Desk.     If you have any questions and/or concerns, please do not hesitate to call:  (Prior to the day of surgery)  [facility] unit:  [phone]  (Day of surgery)  [facility] unit:  [phone]

## 2020-06-30 ENCOUNTER — HOSPITAL ENCOUNTER (OUTPATIENT)
Age: 80
Setting detail: OUTPATIENT SURGERY
Discharge: HOME | End: 2020-06-30
Attending: SURGERY | Admitting: SURGERY
Payer: MEDICARE

## 2020-06-30 ENCOUNTER — ANESTHESIA (OUTPATIENT)
Dept: SURGERY | Age: 80
End: 2020-06-30
Payer: MEDICARE

## 2020-06-30 ENCOUNTER — APPOINTMENT (OUTPATIENT)
Dept: NUCLEAR MEDICINE | Age: 80
End: 2020-06-30
Attending: SURGERY
Payer: MEDICARE

## 2020-06-30 ENCOUNTER — APPOINTMENT (OUTPATIENT)
Dept: MAMMOGRAPHY | Age: 80
End: 2020-06-30
Attending: SURGERY
Payer: MEDICARE

## 2020-06-30 VITALS
HEIGHT: 62 IN | BODY MASS INDEX: 21.79 KG/M2 | RESPIRATION RATE: 20 BRPM | SYSTOLIC BLOOD PRESSURE: 138 MMHG | HEART RATE: 85 BPM | DIASTOLIC BLOOD PRESSURE: 72 MMHG | TEMPERATURE: 98.1 F | WEIGHT: 118.4 LBS | OXYGEN SATURATION: 93 %

## 2020-06-30 DIAGNOSIS — Z17.0 MALIGNANT NEOPLASM OF OVERLAPPING SITES OF LEFT BREAST IN FEMALE, ESTROGEN RECEPTOR POSITIVE (HCC): ICD-10-CM

## 2020-06-30 DIAGNOSIS — C50.812 MALIGNANT NEOPLASM OF OVERLAPPING SITES OF LEFT BREAST IN FEMALE, ESTROGEN RECEPTOR POSITIVE (HCC): ICD-10-CM

## 2020-06-30 DIAGNOSIS — Z98.890 STATUS POST LEFT BREAST LUMPECTOMY: ICD-10-CM

## 2020-06-30 PROCEDURE — 74011636320 HC RX REV CODE- 636/320: Performed by: SURGERY

## 2020-06-30 PROCEDURE — 74011250636 HC RX REV CODE- 250/636: Performed by: NURSE ANESTHETIST, CERTIFIED REGISTERED

## 2020-06-30 PROCEDURE — 74011000258 HC RX REV CODE- 258: Performed by: SURGERY

## 2020-06-30 PROCEDURE — 77030031139 HC SUT VCRL2 J&J -A: Performed by: SURGERY

## 2020-06-30 PROCEDURE — 77030018836 HC SOL IRR NACL ICUM -A: Performed by: SURGERY

## 2020-06-30 PROCEDURE — 77030012510 HC MSK AIRWY LMA TELE -B: Performed by: ANESTHESIOLOGY

## 2020-06-30 PROCEDURE — 76010000149 HC OR TIME 1 TO 1.5 HR: Performed by: SURGERY

## 2020-06-30 PROCEDURE — 74011000250 HC RX REV CODE- 250: Performed by: NURSE ANESTHETIST, CERTIFIED REGISTERED

## 2020-06-30 PROCEDURE — 88307 TISSUE EXAM BY PATHOLOGIST: CPT

## 2020-06-30 PROCEDURE — 77030010516 HC APPL HEMA CLP TELE -B: Performed by: SURGERY

## 2020-06-30 PROCEDURE — 77030010507 HC ADH SKN DERMBND J&J -B: Performed by: SURGERY

## 2020-06-30 PROCEDURE — 76210000021 HC REC RM PH II 0.5 TO 1 HR: Performed by: SURGERY

## 2020-06-30 PROCEDURE — 77030002996 HC SUT SLK J&J -A: Performed by: SURGERY

## 2020-06-30 PROCEDURE — 77030002933 HC SUT MCRYL J&J -A: Performed by: SURGERY

## 2020-06-30 PROCEDURE — 74011000250 HC RX REV CODE- 250: Performed by: SURGERY

## 2020-06-30 PROCEDURE — 77030013079 HC BLNKT BAIR HGGR 3M -A: Performed by: ANESTHESIOLOGY

## 2020-06-30 PROCEDURE — 76060000033 HC ANESTHESIA 1 TO 1.5 HR: Performed by: SURGERY

## 2020-06-30 PROCEDURE — 76210000006 HC OR PH I REC 0.5 TO 1 HR: Performed by: SURGERY

## 2020-06-30 PROCEDURE — 74011250637 HC RX REV CODE- 250/637: Performed by: NURSE ANESTHETIST, CERTIFIED REGISTERED

## 2020-06-30 RX ORDER — DEXAMETHASONE SODIUM PHOSPHATE 4 MG/ML
INJECTION, SOLUTION INTRA-ARTICULAR; INTRALESIONAL; INTRAMUSCULAR; INTRAVENOUS; SOFT TISSUE AS NEEDED
Status: DISCONTINUED | OUTPATIENT
Start: 2020-06-30 | End: 2020-06-30 | Stop reason: HOSPADM

## 2020-06-30 RX ORDER — MAGNESIUM SULFATE 100 %
4 CRYSTALS MISCELLANEOUS AS NEEDED
Status: DISCONTINUED | OUTPATIENT
Start: 2020-06-30 | End: 2020-06-30 | Stop reason: HOSPADM

## 2020-06-30 RX ORDER — LIDOCAINE HYDROCHLORIDE 20 MG/ML
INJECTION, SOLUTION EPIDURAL; INFILTRATION; INTRACAUDAL; PERINEURAL AS NEEDED
Status: DISCONTINUED | OUTPATIENT
Start: 2020-06-30 | End: 2020-06-30 | Stop reason: HOSPADM

## 2020-06-30 RX ORDER — ONDANSETRON 2 MG/ML
INJECTION INTRAMUSCULAR; INTRAVENOUS AS NEEDED
Status: DISCONTINUED | OUTPATIENT
Start: 2020-06-30 | End: 2020-06-30 | Stop reason: HOSPADM

## 2020-06-30 RX ORDER — SODIUM CHLORIDE, SODIUM LACTATE, POTASSIUM CHLORIDE, CALCIUM CHLORIDE 600; 310; 30; 20 MG/100ML; MG/100ML; MG/100ML; MG/100ML
25 INJECTION, SOLUTION INTRAVENOUS CONTINUOUS
Status: DISCONTINUED | OUTPATIENT
Start: 2020-06-30 | End: 2020-06-30 | Stop reason: HOSPADM

## 2020-06-30 RX ORDER — ACETAMINOPHEN AND CODEINE PHOSPHATE 300; 30 MG/1; MG/1
1 TABLET ORAL
Qty: 5 TAB | Refills: 0 | Status: SHIPPED | OUTPATIENT
Start: 2020-06-30 | End: 2020-07-03

## 2020-06-30 RX ORDER — PROPOFOL 10 MG/ML
INJECTION, EMULSION INTRAVENOUS AS NEEDED
Status: DISCONTINUED | OUTPATIENT
Start: 2020-06-30 | End: 2020-06-30 | Stop reason: HOSPADM

## 2020-06-30 RX ORDER — SODIUM CHLORIDE 0.9 % (FLUSH) 0.9 %
5-40 SYRINGE (ML) INJECTION EVERY 8 HOURS
Status: DISCONTINUED | OUTPATIENT
Start: 2020-06-30 | End: 2020-06-30 | Stop reason: HOSPADM

## 2020-06-30 RX ORDER — ALBUTEROL SULFATE 90 UG/1
AEROSOL, METERED RESPIRATORY (INHALATION) AS NEEDED
Status: DISCONTINUED | OUTPATIENT
Start: 2020-06-30 | End: 2020-06-30 | Stop reason: HOSPADM

## 2020-06-30 RX ORDER — SODIUM CHLORIDE, SODIUM LACTATE, POTASSIUM CHLORIDE, CALCIUM CHLORIDE 600; 310; 30; 20 MG/100ML; MG/100ML; MG/100ML; MG/100ML
INJECTION, SOLUTION INTRAVENOUS
Status: DISCONTINUED | OUTPATIENT
Start: 2020-06-30 | End: 2020-06-30 | Stop reason: HOSPADM

## 2020-06-30 RX ORDER — FENTANYL CITRATE 50 UG/ML
25 INJECTION, SOLUTION INTRAMUSCULAR; INTRAVENOUS AS NEEDED
Status: DISCONTINUED | OUTPATIENT
Start: 2020-06-30 | End: 2020-06-30 | Stop reason: HOSPADM

## 2020-06-30 RX ORDER — ISOSULFAN BLUE 50 MG/5ML
INJECTION, SOLUTION SUBCUTANEOUS AS NEEDED
Status: DISCONTINUED | OUTPATIENT
Start: 2020-06-30 | End: 2020-06-30 | Stop reason: HOSPADM

## 2020-06-30 RX ORDER — SODIUM CHLORIDE 0.9 % (FLUSH) 0.9 %
5-40 SYRINGE (ML) INJECTION AS NEEDED
Status: DISCONTINUED | OUTPATIENT
Start: 2020-06-30 | End: 2020-06-30 | Stop reason: HOSPADM

## 2020-06-30 RX ORDER — EPHEDRINE SULFATE/0.9% NACL/PF 50 MG/5 ML
SYRINGE (ML) INTRAVENOUS AS NEEDED
Status: DISCONTINUED | OUTPATIENT
Start: 2020-06-30 | End: 2020-06-30 | Stop reason: HOSPADM

## 2020-06-30 RX ORDER — BUPIVACAINE HYDROCHLORIDE AND EPINEPHRINE 5; 5 MG/ML; UG/ML
INJECTION, SOLUTION EPIDURAL; INTRACAUDAL; PERINEURAL AS NEEDED
Status: DISCONTINUED | OUTPATIENT
Start: 2020-06-30 | End: 2020-06-30 | Stop reason: HOSPADM

## 2020-06-30 RX ORDER — FENTANYL CITRATE 50 UG/ML
50 INJECTION, SOLUTION INTRAMUSCULAR; INTRAVENOUS
Status: DISCONTINUED | OUTPATIENT
Start: 2020-06-30 | End: 2020-06-30 | Stop reason: HOSPADM

## 2020-06-30 RX ORDER — OXYCODONE AND ACETAMINOPHEN 5; 325 MG/1; MG/1
1 TABLET ORAL AS NEEDED
Status: DISCONTINUED | OUTPATIENT
Start: 2020-06-30 | End: 2020-06-30 | Stop reason: HOSPADM

## 2020-06-30 RX ORDER — FENTANYL CITRATE 50 UG/ML
INJECTION, SOLUTION INTRAMUSCULAR; INTRAVENOUS AS NEEDED
Status: DISCONTINUED | OUTPATIENT
Start: 2020-06-30 | End: 2020-06-30 | Stop reason: HOSPADM

## 2020-06-30 RX ORDER — FAMOTIDINE 20 MG/1
20 TABLET, FILM COATED ORAL ONCE
Status: COMPLETED | OUTPATIENT
Start: 2020-06-30 | End: 2020-06-30

## 2020-06-30 RX ADMIN — PROPOFOL 100 MG: 10 INJECTION, EMULSION INTRAVENOUS at 09:53

## 2020-06-30 RX ADMIN — ALBUTEROL SULFATE 2 PUFF: 90 AEROSOL, METERED RESPIRATORY (INHALATION) at 09:44

## 2020-06-30 RX ADMIN — FENTANYL CITRATE 25 MCG: 50 INJECTION, SOLUTION INTRAMUSCULAR; INTRAVENOUS at 10:45

## 2020-06-30 RX ADMIN — SODIUM CHLORIDE, SODIUM LACTATE, POTASSIUM CHLORIDE, AND CALCIUM CHLORIDE: 600; 310; 30; 20 INJECTION, SOLUTION INTRAVENOUS at 09:47

## 2020-06-30 RX ADMIN — Medication 20 MG: at 10:01

## 2020-06-30 RX ADMIN — CLINDAMYCIN PHOSPHATE 600 MG: 150 INJECTION, SOLUTION INTRAVENOUS at 10:00

## 2020-06-30 RX ADMIN — LIDOCAINE HYDROCHLORIDE 40 MG: 20 INJECTION, SOLUTION INTRAVENOUS at 09:53

## 2020-06-30 RX ADMIN — FAMOTIDINE 20 MG: 20 TABLET ORAL at 08:00

## 2020-06-30 RX ADMIN — ONDANSETRON 4 MG: 2 SOLUTION INTRAMUSCULAR; INTRAVENOUS at 10:15

## 2020-06-30 RX ADMIN — ALBUTEROL SULFATE 3 PUFF: 90 AEROSOL, METERED RESPIRATORY (INHALATION) at 10:33

## 2020-06-30 RX ADMIN — FENTANYL CITRATE 50 MCG: 50 INJECTION, SOLUTION INTRAMUSCULAR; INTRAVENOUS at 09:53

## 2020-06-30 RX ADMIN — DEXAMETHASONE SODIUM PHOSPHATE 4 MG: 4 INJECTION, SOLUTION INTRA-ARTICULAR; INTRALESIONAL; INTRAMUSCULAR; INTRAVENOUS; SOFT TISSUE at 10:15

## 2020-06-30 RX ADMIN — FENTANYL CITRATE 25 MCG: 50 INJECTION, SOLUTION INTRAMUSCULAR; INTRAVENOUS at 10:38

## 2020-06-30 NOTE — INTERVAL H&P NOTE
Update History & Physical    The Patient's History and Physicalwas reviewed with the patient and I examined the patient. There was no change. The surgical site was confirmed by the patient and me. Patient requesting Tylenol with codeine for pain postoperatively. Allergies noted, confirmed patient has taken and tolerated this or similar medication in the past and this is her choice for narcotic pain medication. We discussed tylenol and ibuprofen may be sufficient, assuming she has not been told to avoid either medication (generally due to concerns for kidneys, stomach or liver). I have advised her to limit acetaminophen from all sources to less than 4,000mg per day and not to drive while taking narcotic pain medication. I have recommended taking narcotic pain medication sparingly and only if needed. If using the narcotic pain medication, I have recommended taking with food. We discussed this class of medication can constipate, so I have encouraged use of Miralax or Milk of Magnesia if constipation occurs. This class of medication can also be addicting. Again I recommend taking narcotic pain medication sparingly and only if needed. Plan:  The risk, benefits, expected outcome, and alternative to the recommended procedure have been discussed with the patient. Patient understands and wants to proceed with the procedure.     Electronically signed by Dylon Casarez MD on 6/30/2020 at 9:11 AM

## 2020-06-30 NOTE — ANESTHESIA POSTPROCEDURE EVALUATION
Procedure(s):  Left Partial Mastectomy with two site localization  Left Sentinal Node Biopsy. general    Anesthesia Post Evaluation      Multimodal analgesia: multimodal analgesia used between 6 hours prior to anesthesia start to PACU discharge  Patient location during evaluation: bedside  Patient participation: complete - patient participated  Level of consciousness: awake  Pain management: adequate  Airway patency: patent  Anesthetic complications: no  Cardiovascular status: stable  Respiratory status: acceptable  Hydration status: acceptable  Post anesthesia nausea and vomiting:  controlled      INITIAL Post-op Vital signs:   Vitals Value Taken Time   /69 6/30/2020 11:34 AM   Temp 37.3 °C (99.2 °F) 6/30/2020 11:34 AM   Pulse 79 6/30/2020 11:48 AM   Resp 20 6/30/2020 11:48 AM   SpO2 96 % 6/30/2020 11:48 AM   Vitals shown include unvalidated device data.

## 2020-06-30 NOTE — PERIOP NOTES
Phase 2 Recovery Summary    Report received from OR    Vitals:    06/30/20 1114 06/30/20 1119 06/30/20 1134 06/30/20 1153   BP: 135/72 136/72 135/69 138/72   Pulse: 81 87 77 85   Resp: 21 23 20 20   Temp:   99.2 °F (37.3 °C) 98.1 °F (36.7 °C)   SpO2: 95% 90% 96% 93%   Weight:       Height:           oriented to time, place, person and situation          Lines and Drains  Peripheral Intravenous Line: Removed prior to discharge. Peripheral IV 06/30/20 Right Antecubital (Active)   Site Assessment Clean, dry, & intact 6/30/2020 11:34 AM   Phlebitis Assessment 0 6/30/2020 11:34 AM   Infiltration Assessment 0 6/30/2020 11:34 AM   Dressing Status Clean, dry, & intact 6/30/2020 11:34 AM   Dressing Type Tape;Transparent 6/30/2020 11:34 AM   Hub Color/Line Status Pink 6/30/2020 11:34 AM   Alcohol Cap Used Yes 6/30/2020  8:05 AM       Wound  Wound Breast Left And left axilla (Active)   Dressing Status Clean, dry, and intact 6/30/2020 11:34 AM   Dressing Type Topical skin adhesive/glue 6/30/2020 11:34 AM   Incision Site Well Approximated Yes 6/30/2020 11:34 AM   Number of days: 0        Patient assisted to chair with minimal assist. Pateint tolerating liquids well. [place] [facility] Discharge discussed with patient and family. No questions or concerns at this time. Patient had time to ask any questions. Discharge medications reviewed with patient and appropriate educational materials and side effects teaching were provided. Patient discharged to home with daughter.        Tk Gardner RN

## 2020-06-30 NOTE — OP NOTES
Date of Procedure: 6/30/2020  Preoperative Diagnosis: Malignant Neoplasm Left Breast C50.812   Postoperative Diagnosis: same  Procedure(s):  1.  left partial mastectomy with preoperative localization  2. left axillary sentinel node biopsy, deep axilla  3.  Injection of blue dye left breast for sentinel node mapping      Surgeon(s) and Role:      Indio Gonzalez MD - Primary         Surgical Staff: Circ-1: Delma Stovall RN  Scrub Tech-1: Heaven Coughlin  Surg Asst-1: Peterson Khan      Event Time In     Event Time In   Incision Start 1008   Incision Close      Event Time In   Patient In - Facility (Arrived) 8174   Patient In - [address] [facility]   Anesthesia Start 1212   Patient Ready for OR 3783   Surgeon Available 6099   Patient Out - Pre-op 8933   Patient In - [address]   Surgeon In [address] 1003   Incision Start 1008   Incision Close    Surgeon out of OR 1034   Patient Out - OR    Anesthesia Stop    Patient In - Phase I    Notice to Anesthesia    Care Complete - Phase I    Patient Out - Phase I    Patient In - Phase II    Patient Tolerates Liquids    Patient Ready for Visitors    Patient Education Complete    Patient Voided    Care Complete - Phase II    Patient Out - Phase II    End of Periop Care    Patient In - Overflow    Patient Out - Overflow        Anesthesia: General  Anesthesia staff: Anesthesiologist: Yaneth Grossman MD  CRNA: Darek Jefferson CRNA  Estimated Blood Loss: 3cc  Specimens:   ID Type Source Tests Collected by Time Destination   1 : Left breast short superior long lateral tan anterior Preservative Breast  Emperatriz Fonseca MD 6/30/2020 1014 Pathology   2 : Left breast new inferior margin stich on new margin Preservative Breast  Emperatriz Fonseca MD 6/30/2020 1024 Pathology   3 : Left axillary sentinel lymph node Preservative Lymph Node  Emperatriz Fonseca MD 6/30/2020 1025 Pathology        Findings: clipsx2, localizerx2 in specimen, posterior margin chest wall, new inferior margin sent Complications: none noted  Implants: * No implants in log *          The patient was identified in the preoperative holding area and the risks again were reviewed with the patient who understands and agrees. She understands the risk of bleeding, infection, damage to surrounding structures, hematoma/seroma formation, and the possibility of missing the lesion in question. She also understands additional surgery may be indicated. The patient previously underwent placement of a localizing clip and injection of technetium. She was also marked by me to confirm the site and the procedure. The patient was taken to the operating suite and placed supine on the table. Compression stockings were placed and anesthesia induced without complication. An appropriate  time out was performed and the left breast injected with 2cc of blue dye. She was then prepped and draped in the usual sterile fashion and another time-out was performed to confirm the patient, the side, and the procedure. Local anesthetic was infiltrated. An incision was made at the lateral circumareolar position in the left breast. We then dissected into the subcutaneous tissue of the breast towards both localizer clips. We then used electrocautery to remove a core of breast tissue around the clips attempting to remove the area of concern in its entirety with attention to margins with posterior margin chest wall. There was excellent hemostasis and the specimen was marked for orientation on removal of the tissue. This was then handed off the field for radiographic and pathologic analysis. Specimen radiograph was reviewed by the radiologist.  Additional inferior margin sent. Clips were placed to aby the site. The breast tissue was mobilized to close the defect with 2-0 vicryl. All sponge and instrument counts were reported to me as correct. We continued the closure with a 3-0 vicryl suture, followed by 4-0 monocryl suture.       In the axilla, I identified the appropriate region in the axilla with the gamma probe and made an incision in the axilla, dissecting through the subcutaneous tissue into the deep axilla. [place] lymph nodes were identified in the deep axilla which were  hot and blue. The axillary fascia was closed with 2-0 vicryl. The deep dermal layer was reapproximated with 3-0 vicryl, the skin closed with monocryl subcuticular. Dermabond was then applied. The family was updated after the procedure. The patient was taken to recovery in good condition.

## 2020-06-30 NOTE — PERIOP NOTES
Pre-Op Summary    Pt arrived via car with family/friend and is oriented to time, place, person and situation. Patient with steady gait with none assistive devices. Visit Vitals  /87 (BP 1 Location: Left arm, BP Patient Position: Sitting)   Pulse 85   Temp 98 °F (36.7 °C)   Resp 20   Ht 5' 2\" (1.575 m)   Wt 53.7 kg (118 lb 6.4 oz)   SpO2 100%   BMI 21.66 kg/m²       Peripheral IV located on Right antecubital .    Patients belongings are located locked in store room. Patient's point of contact is Mayra Hansen, daughter and their contact number is: [phone]. They will be called for . They are able to receive medication information. They will be their ride home.

## 2020-06-30 NOTE — PERIOP NOTES
Received pt. From OR via stretcher. Connected to monitors. 11:16 Daughter Ervin Tello updated on pt's condition.

## 2020-06-30 NOTE — ANESTHESIA PREPROCEDURE EVALUATION
Relevant Problems   No relevant active problems       Anesthetic History   No history of anesthetic complications            Review of Systems / Medical History  Patient summary reviewed and pertinent labs reviewed    Pulmonary    COPD: mild      Smoker         Neuro/Psych   Within defined limits           Cardiovascular  Within defined limits                Exercise tolerance: >4 METS     GI/Hepatic/Renal     GERD: well controlled           Endo/Other  Within defined limits           Other Findings   Comments:                  Physical Exam    Airway  Mallampati: II  TM Distance: 4 - 6 cm  Neck ROM: normal range of motion   Mouth opening: Normal     Cardiovascular  Regular rate and rhythm,  S1 and S2 normal,  no murmur, click, rub, or gallop  Rhythm: regular  Rate: normal         Dental    Dentition: Full lower dentures and Full upper dentures     Pulmonary  Breath sounds clear to auscultation               Abdominal  GI exam deferred       Other Findings            Anesthetic Plan    ASA: 3  Anesthesia type: general          Induction: Intravenous  Anesthetic plan and risks discussed with: Patient

## 2020-06-30 NOTE — DISCHARGE INSTRUCTIONS
Patient Education   Patient armband removed and shreddedPatient Education        Open Breast Biopsy: What to Expect at Home  Your Recovery  An open breast biopsy is surgery to take a sample of breast tissue. A breast biopsy may be done to check a lump found during a breast exam. Or it may be done to check an area of concern found on a mammogram or ultrasound. The breast tissue will be sent to a lab, where a doctor will look at the tissue under a microscope to check for breast cancer. Your doctor may have some answers right away. But it can take up to 1 to 2 weeks to get the final results. Your doctor will discuss the results with you. For a few days after the surgery, you will probably feel tired and have some pain. The skin around the cut (incision) may feel firm, swollen, and tender. The area may be bruised. Tenderness usually goes away in a few days, and the bruising within 2 weeks. Firmness and swelling may take 3 to 6 months to go away. The stitches in your incision may dissolve on their own. Or the doctor may take them out 7 to 10 days after surgery. This care sheet gives you a general idea about how long it will take for you to recover. But each person recovers at a different pace. Follow the steps below to get better as quickly as possible. How can you care for yourself at home? Activity  · Rest when you feel tired. Getting enough sleep will help you recover. · Try to walk each day. Start by walking a little more than you did the day before. Bit by bit, increase the amount you walk. Walking boosts blood flow and helps prevent pneumonia and constipation. · For 2 weeks, avoid strenuous activities that put pressure on your chest or that involve vigorous movement of your upper body and arm on the side of the biopsy. Examples of these might include strenuous housework, holding an active child, jogging, or aerobic exercise. · For 2 weeks, avoid lifting anything that would make you strain.  This may include heavy grocery bags and milk containers, a heavy briefcase or backpack, cat litter or dog food bags, a vacuum , or a child. · Ask your doctor when you can drive again. · You will probably need to take 1 or 2 days off from work. This depends on the type of work you do and how you feel. Diet  · You can eat your normal diet. If your stomach is upset, try bland, low-fat foods like plain rice, broiled chicken, toast, and yogurt. Medicines  · Your doctor will tell you if and when you can restart your medicines. He or she will also give you instructions about taking any new medicines. · If you take aspirin or some other blood thinner, ask your doctor if and when to start taking it again. Make sure that you understand exactly what your doctor wants you to do. · Be safe with medicines. Take pain medicines exactly as directed. ? If the doctor gave you a prescription medicine for pain, take it as prescribed. ? If you are not taking a prescription pain medicine, ask your doctor if you can take an over-the-counter medicine. · If you think your pain medicine is making you sick to your stomach:  ? Take your medicine after meals (unless your doctor has told you not to). ? Ask your doctor for a different pain medicine. · If your doctor prescribed antibiotics, take them as directed. Do not stop taking them just because you feel better. You need to take the full course of antibiotics. Incision care  · If you have strips of tape on the incision, leave the tape on for a week or until it falls off. · Wash the area daily with warm, soapy water, and pat it dry. Don't use hydrogen peroxide or alcohol, which can slow healing. You may cover the area with a gauze bandage if it weeps or rubs against clothing. Change the bandage every day. · Keep the area clean and dry. Other instructions  · For the first 3 days after surgery, wear a supportive bra all the time, even at night.   Follow-up care is a key part of your treatment and safety. Be sure to make and go to all appointments, and call your doctor if you are having problems. It's also a good idea to know your test results and keep a list of the medicines you take. When should you call for help? [id number] anytime you think you may need emergency care. For example, call if:  · You passed out (lost consciousness). · You have chest pain, are short of breath, or cough up blood. Call your doctor now or seek immediate medical care if:  · You are sick to your stomach or cannot drink fluids. · You have pain that does not get better after you take pain medicine. · You cannot pass stools or gas. · You have signs of a blood clot in your leg (called a deep vein thrombosis), such as:  ? Pain in your calf, back of the knee, thigh, or groin. ? Redness or swelling in your leg. · You have signs of infection, such as:  ? Increased pain, swelling, warmth, or redness. ? Red streaks leading from the incision. ? Pus draining from the incision. ? A fever. · You have loose stitches, or your incision comes open. · Bright red blood has soaked through the bandage over your incision. Watch closely for changes in your health, and be sure to contact your doctor if:  · You have any problems. · You have new or worse swelling or pain in your arm. Where can you learn more? Go to http://www.gray.com/  Enter C923 in the search box to learn more about \"Open Breast Biopsy: What to Expect at Home. \"  Current as of: August 22, 2019               Content Version: 12.5  © 0847-8567 Healthwise, Incorporated. Care instructions adapted under license by [employer] (which disclaims liability or warranty for this information). If you have questions about a medical condition or this instruction, always ask your healthcare professional. [place] [address] any warranty or liability for your use of this information.            Learning About Coronavirus (COVID-19)  Coronavirus (COVID-19): Overview  What is coronavirus ([id number])? The coronavirus disease (COVID-19) is caused by a virus. It is an illness that was [facility]. It has since spread worldwide. The virus can cause fever, cough, and trouble breathing. In severe cases, it can cause pneumonia and make it hard to breathe without help. It can cause death. Coronaviruses are a large group of viruses. They cause the common cold. They also cause more serious illnesses like Middle East respiratory syndrome (MERS) and severe acute respiratory syndrome (SARS). COVID-19 is caused by a novel coronavirus. That means it's a new type that has not been seen in people before. This virus spreads person-to-person through droplets from coughing and sneezing. It can also spread when you are close to someone who is infected. And it can spread when you touch something that has the virus on it, such as a doorknob or a tabletop. What can you do to protect yourself from coronavirus (COVID-19)? The best way to protect yourself from getting sick is to:  · Avoid areas where there is an outbreak. · Avoid contact with people who may be infected. · Wash your hands often with soap or alcohol-based hand sanitizers. · Avoid crowds and try to stay at least 6 feet away from other people. · Wash your hands often, especially after you cough or sneeze. Use soap and water, and scrub for at least 20 seconds. If soap and water aren't available, use an alcohol-based hand . · Avoid touching your mouth, nose, and eyes. What can you do to avoid spreading the virus to others? To help avoid spreading the virus to others:  · Cover your mouth with a tissue when you cough or sneeze. Then throw the tissue in the trash. · Use a disinfectant to clean things that you touch often. · Stay home if you are sick or have been exposed to the virus. Don't go to school, work, or public areas.  And don't use public transportation. · If you are sick:  ? Leave your home only if you need to get medical care. But call the doctor's office first so they know you're coming. And wear a face mask, if you have one.  ? If you have a face mask, wear it whenever you're around other people. It can help stop the spread of the virus when you cough or sneeze. ? Clean and disinfect your home every day. Use household  and disinfectant wipes or sprays. Take special care to clean things that you grab with your hands. These include doorknobs, remote controls, phones, and handles on your refrigerator and microwave. And don't forget countertops, tabletops, bathrooms, and computer keyboards. When to call for help  Call 911 anytime you think you may need emergency care. For example, call if:  · You have severe trouble breathing. (You can't talk at all.)  · You have constant chest pain or pressure. · You are severely dizzy or lightheaded. · You are confused or can't think clearly. · Your face and lips have a blue color. · You pass out (lose consciousness) or are very hard to wake up. Call your doctor now if you develop symptoms such as:  · Shortness of breath. · Fever. · Cough. If you need to get care, call ahead to the doctor's office for instructions before you go. Make sure you wear a face mask, if you have one, to prevent exposing other people to the virus. Where can you get the latest information? The following health organizations are tracking and studying this virus. Their websites contain the most up-to-date information. [place] also learn what to do if you think you may have been exposed to the virus. · U.S. Centers for Disease Control and Prevention (CDC): The CDC provides updated news about the disease and travel advice. The website also tells you how to prevent the spread of infection. www.cdc.gov  · World Health Organization [facility]): WHO offers information about the virus outbreaks. WHO also has travel advice. www.who.int  Current as of: April 1, 2020               Content Version: 12.4  © 4040-6968 Healthwise, Incorporated. Care instructions adapted under license by your healthcare professional. If you have questions about a medical condition or this instruction, always ask your healthcare professional. [address] any warranty or liability for your use of this information. DISCHARGE SUMMARY from Nurse    PATIENT INSTRUCTIONS:    After general anesthesia or intravenous sedation, for 24 hours or while taking prescription Narcotics:  · Limit your activities  · Do not drive and operate hazardous machinery  · Do not make important personal or business decisions  · Do  not drink alcoholic beverages  · If you have not urinated within 8 hours after discharge, please contact your surgeon on call. Report the following to your surgeon:  · Excessive pain, swelling, redness or odor of or around the surgical area  · Temperature over 100.5  · Nausea and vomiting lasting longer than 4 hours or if unable to take medications  · Any signs of decreased circulation or nerve impairment to extremity: change in color, persistent  numbness, tingling, coldness or increase pain  · Any questions      These are general instructions for a healthy lifestyle:    No smoking/ No tobacco products/ Avoid exposure to second hand smoke  Surgeon General's Warning:  Quitting smoking now greatly reduces serious risk to your health. Obesity, smoking, and sedentary lifestyle greatly increases your risk for illness    A healthy diet, regular physical exercise & weight monitoring are important for maintaining a healthy lifestyle    You may be retaining fluid if you have a history of heart failure or if you experience any of the following symptoms:  Weight gain of 3 pounds or more overnight or 5 pounds in a week, increased swelling in our hands or feet or shortness of breath while lying flat in bed.   Please call your doctor as soon as you notice any of these symptoms; do not wait until your next office visit. The discharge information has been reviewed with the patient. The patient verbalized understanding. Discharge medications reviewed with the patient and appropriate educational materials and side effects teaching were provided.   ___________________________________________________________________________________________________________________________________

## 2020-07-01 ENCOUNTER — NURSE NAVIGATOR (OUTPATIENT)
Dept: OTHER | Age: 80
End: 2020-07-01

## 2020-07-01 NOTE — NURSE NAVIGATOR
Postop call to pt s/p left lumpectomy with SLN biopsy. Spoke with patient's daughter who stated that pt was having pain about 9-10/10 at 6 am this morning, pt was given the prescribed pain medication. . Daughter said that around 10 am pt was still in pain. When asked pain scale at present daughter stated that patient said scale was 1-2/10. Encouraged daughter to given pain medication as prescribed  for pain. Daughter said that pt did not want to take another pain pill when she asked earlier. Informed daughter to call Dr. Yovanny Love office for any questions or concerns. Pt denies fever or discharge from incision site. Postop surgery scheduled for 7/8/2020. All questions answered.

## 2020-07-08 ENCOUNTER — NURSE NAVIGATOR (OUTPATIENT)
Dept: OTHER | Age: 80
End: 2020-07-08

## 2020-07-08 ENCOUNTER — OFFICE VISIT (OUTPATIENT)
Dept: SURGERY | Age: 80
End: 2020-07-08

## 2020-07-08 VITALS
TEMPERATURE: 97 F | RESPIRATION RATE: 20 BRPM | HEIGHT: 62 IN | DIASTOLIC BLOOD PRESSURE: 84 MMHG | WEIGHT: 118 LBS | SYSTOLIC BLOOD PRESSURE: 141 MMHG | BODY MASS INDEX: 21.71 KG/M2 | HEART RATE: 94 BPM

## 2020-07-08 DIAGNOSIS — Z17.0 MALIGNANT NEOPLASM OF OVERLAPPING SITES OF LEFT BREAST IN FEMALE, ESTROGEN RECEPTOR POSITIVE (HCC): Primary | ICD-10-CM

## 2020-07-08 DIAGNOSIS — C50.812 MALIGNANT NEOPLASM OF OVERLAPPING SITES OF LEFT BREAST IN FEMALE, ESTROGEN RECEPTOR POSITIVE (HCC): Primary | ICD-10-CM

## 2020-07-08 NOTE — LETTER
7/8/2020 10:05 AM 
 
Patient:  Elle Olson YOB: 1940 Date of Visit: 7/8/2020 Matt García MD 
[place] [address] [address] [address] [place] [id number] VIA In Basket Jose Alejandro Johnson DO 
[address] [facility] [address] [address] VIA In Basket Dear MD Jose Alejandro Young DO, 
 
 
I had the pleasure of seeing Ms. Destiney Askew in my office today for her breast cancer. I am including a copy of my office visit today. If you have questions, please do not hesitate to call me. I look forward to following MsDebbie Gorman along with you and will keep you updated as to her progress. Sincerely, Madalyn Golden MD

## 2020-07-08 NOTE — NURSE NAVIGATOR
Postop visit with Dr. Radha Marshall s/p left lumpectomy on 6/30/2020. Patient states that she is doing well, still experiencing some soreness at surgical site. Patient will follow up with Dr. Ruiz Frank on 7/20/2020 at 11 am [facility] office. She will continue to follow up with med/onc-Dr. Rachael Wilkinson. Daughter stated that pt stopped taking hormone blocking therapy 2 days before surgery as instructed by Dr. Rachael Wilkinson. No financial, social, or psychological concerns today. Pt counseled on smoking cessation. All questions answered.

## 2020-07-08 NOTE — PROGRESS NOTES
Breast Cancer     Ms. Elle Olson is a 79year old woman with left T1bN0 ER/UT positive, HER negative invasive ductal carcinoma, s/p left partial mastectomy, sentinel node biopsy 6/30/20. She was diagnosed on core biopsy 3/13/20. Due to the [place] pandemic, based on recommendations issued from medical and government sources, she was started on upfront aromatase inhibitor with delay of surgery until resources are more available and non-emergent surgery permitted. She was started on letrozole per Dr. Suleman Woods. The area of concern was identified on screening imaging. She denied palpable mass. She denied nipple discharge. She denied breast pain. There family history of breast cancer. A sister had pancreatic cancer. Her most recent previous mammogram was November 2015. She already followed with Dr. Suleman Woods for her monoclonal gammopathy. Genetic testing did not show any clinically significant mutation. She has an APC variant of uncertain significance. Posterior margin is close, but is chest wall. Breast/GYN history:    OB History    No obstetric history on file.           Past Medical History:   Diagnosis Date    Allergic rhinitis     Breast cancer ([facility] [address] 03/2020    Dr Kathleen Galeas; ER/UT+, HER-2 neg infiltrating ductal ca 2 sites LEFT breast; Dr Chase Sam adenoma     1/15 Dr Lenox Boast; polyp 2006 Dr Gwen Oneil COPD     on films    DJD (degenerative joint disease)     Dyslipidemia     FHx: heart disease     GERD (gastroesophageal reflux disease)     s/p dilation Dr. Maycol Mcihel 2006; [facility] and esophagitis 1/15 Dr Lenox Boast    Monoclonal gammopathy 2011 Dr. Sandoval Police Osteoporosis Dr. Aydee Suazo, t score -0.6 spine, -2.7 hip     took forteo 2012 to 2014 Dr Herring    Recurrent BCC (basal cell carcinoma)     Dr. Malone Sox dependence syndrome        Past Surgical History:   Procedure Laterality Date    [address]    neg RIGHT breast biopsy Dr. Tamanna Mcguire HX BREAST LUMPECTOMY Left 06/30/2020    HX BREAST LUMPECTOMY Left 6/30/2020    Left Sentinal Node Biopsy performed by Robi [facility]bs [facility] at [facility]      Dr. Codie Greene 2006 polyp; Dr Otho Boeck 1/15 adenoma    HX HYSTERECTOMY  1970s    for precancerous lesions in the cervix    HX MASTECTOMY Left 6/30/2020    Left Partial Mastectomy with two site localization performed by Robi Hobbs MD at [facility] MAIN OR    HX ORTHOPAEDIC      DEXA t score 0.32 spine, -2.28 hip FRAX 5.3 hip Dr Cindy Washburn (12/13); spine 0.68, hip -2.51 w FRAX 7.2 (12/15)    HX ORTHOPAEDIC  05/2017    RIGHT CTS release and thumb surgery Dr Devi Kathleen      s/p c spine surgery [facility] 2010?  NEUROLOGICAL PROCEDURE UNLISTED  12/2017    MMSE 30/30    [place] BIOPSY OF BREAST, NEEDLE CORE Left 03/2020    seeing Dr Long Weber       Current Outpatient Medications on File Prior to Visit   Medication Sig Dispense Refill    amLODIPine (NORVASC) 5 mg tablet Take 1 Tab by mouth daily. 90 Tab 3    atorvastatin (LIPITOR) 80 mg tablet Take 1 Tab by mouth daily. 90 Tab 3    ezetimibe (Zetia) 10 mg tablet Take 1 Tab by mouth daily. 90 Tab 3    tiotropium (Spiriva with HandiHaler) 18 mcg inhalation capsule Take 1 Cap by inhalation daily. 90 Cap 3    denosumab (PROLIA) 60 mg/mL injection 60 mg by SubCUTAneous route every 6 months.  CALCIUM CARBONATE (CALCIUM 500 PO) Take  by mouth daily.  albuterol (PROVENTIL HFA, VENTOLIN HFA, PROAIR HFA) 90 mcg/actuation inhaler Take 1 Puff by inhalation every six (6) hours as needed for Wheezing. 1 Inhaler 5    fluticasone (FLONASE) 50 mcg/actuation nasal spray 2 Sprays by Both Nostrils route daily. (Patient taking differently: 2 Sprays by Both Nostrils route as needed.) 3 Bottle 3    Cholecalciferol, Vitamin D3, (VITAMIN D3) 1,000 unit Cap Take 1,000 Units by mouth daily.  aspirin delayed-release 81 mg tablet Take 81 mg by mouth daily.       letrozole [facility]) 2.5 mg tablet Take 2.5 mg by mouth daily.  cyanocobalamin 1,000 mcg tablet Take 1,000 mcg by mouth daily. No current facility-administered medications on file prior to visit.         Allergies   Allergen Reactions    Penicillins Rash and Hives       Social History     Tobacco Use    Smoking status: Current Every Day Smoker     Packs/day: 1.00     Years: 50.00     Pack years: 50.00     Types: Cigarettes    Smokeless tobacco: Never Used   Substance Use Topics    Alcohol use: No    Drug use: No       Family History   Problem Relation Age of Onset    Heart Attack Father     Kidney Disease Mother         ADPKD    Diabetes Sister     Cancer Sister         leukemia         ROS: positives are bolded  General: fevers, chills, night sweats, fatigue, weight loss, weight gain  GI: nausea, vomiting, abdominal pain, change in bowel habits, hematochezia, melena, GERD  Integ: dermatitis, abnormal moles  HEENT: visual changes, vertigo, epistaxis, dysphagia, hoarseness  Cardiac: chest pain, palpitations, HTN, edema, varicosities  Resp: cough, shortness of breath, wheezing, hemoptysis, snoring, reactive airway disease  : hematuria, dysuria, frequency, urgency, nocturia, stress urinary incontinence   MSK: weakness, joint pain, arthritis  Endocrine:  thyroid disease, polyuria, polydipsia, polyphagia, poor wound healing, heat intolerance, cold intolerance  Lymph/Heme: anemia, bruising, history of blood transfusions  Neuro: dizziness, headache, fainting, seizures, stroke  Psych: anxiety, depression    Physical Exam:  Visit Vitals  /84   Pulse 94   Temp 97 °F (36.1 °C) (Oral)   Resp 20   Ht 5' 2\" (1.575 m)   Wt 53.5 kg (118 lb)   BMI 21.58 kg/m²       Gen:  No distress  Head: normocephalic, atraumatic  Mouth: Clear, no overt lesions, oral mucosa pink and moist  Neck: supple, no masses, no adenopathy, trachea midline  Resp: clear bilaterally  Cardio: Regular rate and rhythm  Abdomen: soft, nontender, nondistended  Extremeties: warm, well-perfused  Neuro: sensation and strength grossly intact and symmetrical  Psych: alert and oriented to person, place and time  Breasts: palpation deferred today  Right: Examined in both the supine and upright positions. There was no supraclavicular, infraclavicular, or axillary lympadenopathy. There were no dominant masses, no skin changes, no asymmetry identified   Left: Examined in both the supine and upright positions. There was no supraclavicular, infraclavicular, or axillary lympadenopathy. There were no skin changes  Incision clean, ecchymosis      Imaging:  3/10/20 left mammo/ultrasound (HV)  Irregular hypoechoic masses at the left breast the 11:30 position, 3 cm from the  nipple and 12:30 position, 3 cm from nipple are highly suspicious. Recommend  ultrasound-guided biopsy of both masses.     Findings were discussed with the patient and her daughter at the time of the  exam.      BIRADS 5:  Highly suggestive of malignancy:  appropriate action should be taken     Breast Density B: There are scattered areas of fibroglandular density. 3/4/20 bilateral mammo (HV)  2 spiculated masses in the left breast including upper inner quadrant and upper  outer quadrant. 2-D 3-D CC and MLO spot compression and mediolateral views  recommended along with ultrasound. Ultrasound of the left axilla recommended.     BI-RADS Assessment Category 0: Incomplete. Need additional imaging evaluation.      Density B: There are scattered areas of fibroglandular density. Pathology:  6/30/20     A: BREAST, LEFT, PARTIAL MASTECTOMY:   INFILTRATING DUCTAL CARCINOMAS. SIZES: MASS #1 - 1.0 CM; MASS #2 - 1.0 CM. HISTOLOGIC GRADE: MASS #1 - II OF III; MASS #2 - I OF III. TUBULAR DIFFERENTIATION, NUCLEAR PLEOMORPHISM AND MITOTIC ACTIVITY SCORES: MASS #1 - T3 N2 M1; MASS #2 - T2 N2 M1. IN-SITU COMPONENTS: HIGH AND INTERMEDIATE GRADE DUCTAL   CARCINOMAS IN-SITU (SEE MICROSCOPIC DESCRIPTION). MARGINS OF RESECTION: NOT INVOLVED. DISTANCE FROM THE CLOSEST MARGINS: THE INVASIVE COMPONENT IS   LOCATED 0.25 CM FROM THE ANTERIOR MARGIN AND THE IN-SITU   COMPONENT IS LOCATED 0.125 CM FROM THE POSTERIOR MARGIN. TREATMENT EFFECT: NOT KNOWN.   ESTROGEN RECEPTOR: POSITIVE ([id number]). PROGESTERONE RECEPTOR: POSITIVE ([id number]). HER-2/JARRED (IHC): NEGATIVE ([id number]). AJCC STAGE: pT1b N0   B: BREAST, LEFT, NEW INFERIOR MARGIN, PARTIAL MASTECTOMY:   CAUTERIZED TISSUE WITH NO RESIDUAL IN-SITU OR INVASIVE CARCINOMA. MARGINS OF RESECTION WITH NO SIGNIFICANT HISTOPATHOLOGIC ABNORMALITIES. C: LYMPH NODES, LEFT AXILLA, SENTINEL, BIOPSY:   TWO LYMPH NODES WITH NO METASTATIC CARCINOMA (0/2). 3/13/20   A: LEFT BREAST, 11:30, CORE NEEDLE BIOPSY-   INFILTRATING DUCTAL ADENOCARCINOMA, NUCLEAR GRADE 2. Estrogen Receptor (ER): Positive (>90% of cells stain with strong intensity). Progesterone Receptor (PgR): Positive (>90 % of cells stain with moderate intensity). Her-2 (IHC Method): Negative (score 1+)   Percentage of cells with intense complete membrane stainin%    B: LEFT BREAST, 12:30, CORE NEEDLE BIOPSY-   INFILTRATING DUCTAL ADENOCARCINOMA, NUCLEAR GRADE 2. Estrogen Receptor (ER): Positive (>90% of cells stain with strong intensity). Progesterone Receptor (PgR): Positive (>90 % of cells stain with strong intensity). Her-2 (IHC Method): Negative (score 1+)   Percentage of cells with intense complete membrane stainin% .      Impression:  Patient Active Problem List   Diagnosis Code    Colon polyps Dr. Valencia 2006 K63.5    Osteoporosis Dr. Aydee Suazo M81.0    Monoclonal gammopathy  Dr. Marika Trivedi D47.2    COPD on films J44.9    Dyslipidemia E78.5    Arthritis, degenerative M19.90    Gastroesophageal reflux disease without esophagitis K21.9    Advance directive discussed with patient Z71.89    Tobacco dependence syndrome F17.200    Primary hypertension I10    Cancer of overlapping sites of left female breast [facility]) C49.80        79year old woman with left T1bN0 ER/AR positive, HER negative invasive ductal carcinoma, s/p left partial mastectomy, sentinel node biopsy 6/30/20. The pathology was reviewed. Posterior margin is close, but is chest wall. Follow up with Dr. Diane Palomares and Dr. Karolyn Duarte. Follow up with me 1 month. Tobacco cessation again recommended. All questions were answered. She was asked to call with any additional questions or concerns.

## 2020-07-08 NOTE — PROGRESS NOTES
1. Have you been to the ER, urgent care clinic since your last visit? Hospitalized since your last visit? No    2. Have you seen or consulted any other health care providers outside of the [address] [place] [address] since your last visit? Include any pap smears or colon screening. No     Patient presents for post op left T1bN0 ER/ND positive, HER negative invasive ductal carcinoma, s/p left partial mastectomy, sentinel node biopsy 6/30/20.

## 2020-07-30 ENCOUNTER — HOSPITAL ENCOUNTER (OUTPATIENT)
Dept: RADIATION THERAPY | Age: 80
Discharge: HOME | End: 2020-07-30
Payer: MEDICARE

## 2020-07-30 PROCEDURE — 99211 OFF/OP EST MAY X REQ PHY/QHP: CPT

## 2020-07-31 ENCOUNTER — HOSPITAL ENCOUNTER (OUTPATIENT)
Dept: RADIATION THERAPY | Age: 80
Discharge: HOME | End: 2020-07-31
Payer: MEDICARE

## 2020-07-31 PROCEDURE — 77332 RADIATION TREATMENT AID(S): CPT

## 2020-07-31 PROCEDURE — 77290 THER RAD SIMULAJ FIELD CPLX: CPT

## 2020-08-03 ENCOUNTER — HOSPITAL ENCOUNTER (OUTPATIENT)
Dept: RADIATION THERAPY | Age: 80
Discharge: HOME | End: 2020-08-03
Payer: MEDICARE

## 2020-08-03 PROCEDURE — 77334 RADIATION TREATMENT AID(S): CPT

## 2020-08-03 PROCEDURE — 77300 RADIATION THERAPY DOSE PLAN: CPT

## 2020-08-03 PROCEDURE — 77295 3-D RADIOTHERAPY PLAN: CPT

## 2020-08-04 NOTE — PROGRESS NOTES
Breast Cancer     Ms. Que Atkinson is a 79year old woman with left T1bN0 ER/AK positive, HER negative invasive ductal carcinoma, s/p left partial mastectomy, sentinel node biopsy 6/30/20. She was diagnosed on core biopsy 3/13/20. Due to the [place] pandemic, based on recommendations issued from medical and government sources, she was started on upfront aromatase inhibitor with delay of surgery until resources are more available and non-emergent surgery permitted. She was started on letrozole per Dr. Karis Jack. The area of concern was identified on screening imaging. She denied palpable mass. She denied nipple discharge. She denied breast pain. There family history of breast cancer. A sister had pancreatic cancer. Her most recent previous mammogram was November 2015. She already followed with Dr. Karis Jack for her monoclonal gammopathy. Genetic testing did not show any clinically significant mutation. She has an APC variant of uncertain significance. Posterior margin is close, but is chest wall. She is starting radiation this week. Breast/GYN history:    OB History    No obstetric history on file.           Past Medical History:   Diagnosis Date    Allergic rhinitis     Breast cancer ([facility] [address] 03/2020    Dr Poncho Valdivia; ER/AK+, HER-2 neg infiltrating ductal ca 2 sites LEFT breast; Dr Marimar Shelley adenoma     1/15 Dr Ana Ruiz; polyp 2006 Dr Kelley Dials COPD     on films    DJD (degenerative joint disease)     Dyslipidemia     FHx: heart disease     GERD (gastroesophageal reflux disease)     s/p dilation Dr. Crystal Villalobos 2006; [place] and esophagitis 1/15 Dr Ana Ruiz    Monoclonal gammopathy 2011 Dr. Ginna Truong Osteoporosis Dr. Soraya Martines, t score -0.6 spine, -2.7 hip     took forteo 2012 to 2014 Dr Herring    Recurrent [address] (basal cell carcinoma)     Dr. Otilio Horton dependence syndrome        Past Surgical History:   Procedure Laterality Date   [address] neg RIGHT breast biopsy Dr. Chris Parmar HX BREAST LUMPECTOMY Left 06/30/2020    HX BREAST LUMPECTOMY Left 6/30/2020    Left Sentinal Node [facility]      Dr. Moran Letters 2006 polyp; Dr Taylor Helton 1/15 adenoma    HX HYSTERECTOMY  1970s    for precancerous lesions in the cervix    HX MASTECTOMY Left 6/30/2020    Left Partial Mastectomy with two site localization performed by Diana Abebe MD at [facility] MAIN OR    HX ORTHOPAEDIC      DEXA t score 0.32 spine, -2.28 hip FRAX 5.3 hip Dr Zion Fonseca (12/13); spine 0.68, hip -2.51 w FRAX 7.2 (12/15)    HX ORTHOPAEDIC  05/2017    RIGHT CTS release and thumb surgery Dr Evelin Guerrero      s/p c spine surgery [id number] 2010?  NEUROLOGICAL PROCEDURE UNLISTED  12/2017    MMSE 30/30    [place] BIOPSY OF BREAST, NEEDLE CORE Left 03/2020    seeing Dr Jason Henderson       Current Outpatient Medications on File Prior to Visit   Medication Sig Dispense Refill    amLODIPine (NORVASC) 5 mg tablet Take 1 Tab by mouth daily. 90 Tab 3    atorvastatin (LIPITOR) 80 mg tablet Take 1 Tab by mouth daily. 90 Tab 3    ezetimibe (Zetia) 10 mg tablet Take 1 Tab by mouth daily. 90 Tab 3    tiotropium (Spiriva with HandiHaler) 18 mcg inhalation capsule Take 1 Cap by inhalation daily. 90 Cap 3    denosumab (PROLIA) 60 mg/mL injection 60 mg by SubCUTAneous route every 6 months.  CALCIUM CARBONATE (CALCIUM 500 PO) Take  by mouth daily.  albuterol (PROVENTIL HFA, VENTOLIN HFA, PROAIR HFA) 90 mcg/actuation inhaler Take 1 Puff by inhalation every six (6) hours as needed for Wheezing. 1 Inhaler 5    fluticasone (FLONASE) 50 mcg/actuation nasal spray 2 Sprays by Both Nostrils route daily. (Patient taking differently: 2 Sprays by Both Nostrils route as needed.) 3 Bottle 3    Cholecalciferol, Vitamin D3, (VITAMIN D3) 1,000 unit Cap Take 1,000 Units by mouth daily.       aspirin delayed-release 81 mg tablet Take 81 mg by mouth daily.  letrozole (FEMARA) 2.5 mg tablet Take 2.5 mg by mouth daily.  cyanocobalamin 1,000 mcg tablet Take 1,000 mcg by mouth daily. No current facility-administered medications on file prior to visit.         Allergies   Allergen Reactions    Penicillins Rash and Hives       Social History     Tobacco Use    Smoking status: Current Every Day Smoker     Packs/day: 1.00     Years: 50.00     Pack years: 50.00     Types: Cigarettes    Smokeless tobacco: Never Used   Substance Use Topics    Alcohol use: No    Drug use: No       Family History   Problem Relation Age of Onset    Heart Attack Father     Kidney Disease Mother         ADPKD    Diabetes Sister     Cancer Sister         leukemia         ROS: positives are bolded  General: fevers, chills, night sweats, fatigue, weight loss, weight gain  GI: nausea, vomiting, abdominal pain, change in bowel habits, hematochezia, melena, GERD  Integ: dermatitis, abnormal moles  HEENT: visual changes, vertigo, epistaxis, dysphagia, hoarseness  Cardiac: chest pain, palpitations, HTN, edema, varicosities  Resp: cough, shortness of breath, wheezing, hemoptysis, snoring, reactive airway disease  : hematuria, dysuria, frequency, urgency, nocturia, stress urinary incontinence   MSK: weakness, joint pain, arthritis  Endocrine:  thyroid disease, polyuria, polydipsia, polyphagia, poor wound healing, heat intolerance, cold intolerance  Lymph/Heme: anemia, bruising, history of blood transfusions  Neuro: dizziness, headache, fainting, seizures, stroke  Psych: anxiety, depression    Physical Exam:  Visit Vitals  /81   Pulse 91   Temp 97.4 °F (36.3 °C) (Oral)   Resp 18   Ht 5' 2\" (1.575 m)   Wt 53.5 kg (118 lb)   BMI 21.58 kg/m²       Gen:  No distress  Head: normocephalic, atraumatic  Mouth: Clear, no overt lesions, oral mucosa pink and moist  Neck: supple, no masses, no adenopathy, trachea midline  Resp: clear bilaterally  Cardio: Regular rate and rhythm  Abdomen: soft, nontender, nondistended  Extremeties: warm, well-perfused  Neuro: sensation and strength grossly intact and symmetrical  Psych: alert and oriented to person, place and time  Breasts: palpation deferred today  Right: Examined in both the supine and upright positions. There was no supraclavicular, infraclavicular, or axillary lympadenopathy. There were no dominant masses, no skin changes, no asymmetry identified   Left: Examined in both the supine and upright positions. There was no supraclavicular, infraclavicular, or axillary lympadenopathy. There were no skin changes  Incision clean, healing well      Imaging:  3/10/20 left mammo/ultrasound (HV)  Irregular hypoechoic masses at the left breast the 11:30 position, 3 cm from the  nipple and 12:30 position, 3 cm from nipple are highly suspicious. Recommend  ultrasound-guided biopsy of both masses.     Findings were discussed with the patient and her daughter at the time of the  exam.      BIRADS 5:  Highly suggestive of malignancy:  appropriate action should be taken     Breast Density B: There are scattered areas of fibroglandular density. 3/4/20 bilateral mammo (HV)  2 spiculated masses in the left breast including upper inner quadrant and upper  outer quadrant. 2-D 3-D CC and MLO spot compression and mediolateral views  recommended along with ultrasound. Ultrasound of the left axilla recommended.     BI-RADS Assessment Category 0: Incomplete. Need additional imaging evaluation.      Density B: There are scattered areas of fibroglandular density. Pathology:  6/30/20     A: BREAST, LEFT, PARTIAL MASTECTOMY:   INFILTRATING DUCTAL CARCINOMAS. SIZES: MASS #1 - 1.0 CM; MASS #2 - 1.0 CM. HISTOLOGIC GRADE: MASS #1 - II OF III; MASS #2 - I OF III. TUBULAR DIFFERENTIATION, NUCLEAR PLEOMORPHISM AND MITOTIC ACTIVITY SCORES: MASS #1 - T3 N2 M1; MASS #2 - T2 N2 M1.    IN-SITU COMPONENTS: HIGH AND INTERMEDIATE GRADE DUCTAL   CARCINOMAS IN-SITU (SEE MICROSCOPIC DESCRIPTION). MARGINS OF RESECTION: NOT INVOLVED. DISTANCE FROM THE CLOSEST MARGINS: THE INVASIVE COMPONENT IS   LOCATED 0.25 CM FROM THE ANTERIOR MARGIN AND THE IN-SITU   COMPONENT IS LOCATED 0.125 CM FROM THE POSTERIOR MARGIN. TREATMENT EFFECT: NOT KNOWN.   ESTROGEN RECEPTOR: POSITIVE ([id number]). PROGESTERONE RECEPTOR: POSITIVE ([id number]). HER-2/JARRED (IHC): NEGATIVE ([id number]). AJCC STAGE: pT1b N0   B: BREAST, LEFT, NEW INFERIOR MARGIN, PARTIAL MASTECTOMY:   CAUTERIZED TISSUE WITH NO RESIDUAL IN-SITU OR INVASIVE CARCINOMA. MARGINS OF RESECTION WITH NO SIGNIFICANT HISTOPATHOLOGIC ABNORMALITIES. C: LYMPH NODES, LEFT AXILLA, SENTINEL, BIOPSY:   TWO LYMPH NODES WITH NO METASTATIC CARCINOMA (0/2). 3/13/20   A: LEFT BREAST, 11:30, CORE NEEDLE BIOPSY-   INFILTRATING DUCTAL ADENOCARCINOMA, NUCLEAR GRADE 2. Estrogen Receptor (ER): Positive (>90% of cells stain with strong intensity). Progesterone Receptor (PgR): Positive (>90 % of cells stain with moderate intensity). Her-2 (IHC Method): Negative (score 1+)   Percentage of cells with intense complete membrane stainin%    B: LEFT BREAST, 12:30, CORE NEEDLE BIOPSY-   INFILTRATING DUCTAL ADENOCARCINOMA, NUCLEAR GRADE 2. Estrogen Receptor (ER): Positive (>90% of cells stain with strong intensity). Progesterone Receptor (PgR): Positive (>90 % of cells stain with strong intensity). Her-2 (IHC Method): Negative (score 1+)   Percentage of cells with intense complete membrane stainin% .      Impression:  Patient Active Problem List   Diagnosis Code    Colon polyps Dr. Olga Hua  K63.5    Osteoporosis Dr. Mccormick [place] M81.0    Monoclonal gammopathy  Dr. Kemal Little D47.2    COPD on films J44.9    Dyslipidemia E78.5    Arthritis, degenerative M19.90    Gastroesophageal reflux disease without esophagitis K21.9    Advance directive discussed with patient Z71.89    Tobacco dependence syndrome F17.200    Primary hypertension I10    Cancer of overlapping sites of left female breast [facility]) C49.80        79year old woman with left T1bN0 ER/[place] positive, HER negative invasive ductal carcinoma, s/p left partial mastectomy, sentinel node biopsy 6/30/20. Radiation per Dr. Renan Herndon, letrozole per Dr. Rafiq Tolliver. Follow up with me 3 months. Tobacco cessation again recommended. All questions were answered. She was asked to call with any additional questions or concerns.

## 2020-08-05 ENCOUNTER — OFFICE VISIT (OUTPATIENT)
Dept: SURGERY | Age: 80
End: 2020-08-05

## 2020-08-05 VITALS
HEIGHT: 62 IN | SYSTOLIC BLOOD PRESSURE: 135 MMHG | WEIGHT: 118 LBS | HEART RATE: 91 BPM | BODY MASS INDEX: 21.71 KG/M2 | RESPIRATION RATE: 18 BRPM | DIASTOLIC BLOOD PRESSURE: 81 MMHG | TEMPERATURE: 97.4 F

## 2020-08-05 DIAGNOSIS — C50.812 MALIGNANT NEOPLASM OF OVERLAPPING SITES OF LEFT BREAST IN FEMALE, ESTROGEN RECEPTOR POSITIVE (HCC): Primary | ICD-10-CM

## 2020-08-05 DIAGNOSIS — Z17.0 MALIGNANT NEOPLASM OF OVERLAPPING SITES OF LEFT BREAST IN FEMALE, ESTROGEN RECEPTOR POSITIVE (HCC): Primary | ICD-10-CM

## 2020-08-05 NOTE — LETTER
8/5/2020 10:28 AM 
 
Patient:  Jorge Meyers YOB: 1940 Date of Visit: 8/5/2020 Kalpesh Mazariegos MD 
[address] [id number] [place] [phone] VIA In Basket Dear Kalpesh Mazariegos MD, 
 
 
I had the pleasure of seeing Ms. Teddy Kulkarni in my office today for her breast cancer. I am including a copy of my office visit today. If you have questions, please do not hesitate to call me. I look forward to following Ms. Obinna Salas along with you and will keep you updated as to her progress. Sincerely, Pelon Casey MD

## 2020-08-05 NOTE — PROGRESS NOTES
Patient presents for post op left T1bN0 ER/FL positive, HER negative invasive ductal carcinoma, s/p left partial mastectomy, sentinel node biopsy 6/30/20.     1. Have you been to the ER, urgent care clinic since your last visit? Hospitalized since your last visit? No    2. Have you seen or consulted any other health care providers outside of the [address] [place] [address] since your last visit? Include any pap smears or colon screening.  No

## 2020-08-06 ENCOUNTER — HOSPITAL ENCOUNTER (OUTPATIENT)
Dept: RADIATION THERAPY | Age: 80
Discharge: HOME | End: 2020-08-06
Payer: MEDICARE

## 2020-08-06 PROCEDURE — 77280 THER RAD SIMULAJ FIELD SMPL: CPT

## 2020-08-06 PROCEDURE — 77412 RADIATION TX DELIVERY LVL 3: CPT

## 2020-08-07 ENCOUNTER — NURSE NAVIGATOR (OUTPATIENT)
Dept: OTHER | Age: 80
End: 2020-08-07

## 2020-08-07 ENCOUNTER — APPOINTMENT (OUTPATIENT)
Dept: RADIATION THERAPY | Age: 80
End: 2020-08-07
Payer: MEDICARE

## 2020-08-10 ENCOUNTER — HOSPITAL ENCOUNTER (OUTPATIENT)
Dept: RADIATION THERAPY | Age: 80
Discharge: HOME | End: 2020-08-10
Payer: MEDICARE

## 2020-08-10 PROCEDURE — 77412 RADIATION TX DELIVERY LVL 3: CPT

## 2020-08-11 ENCOUNTER — HOSPITAL ENCOUNTER (OUTPATIENT)
Dept: RADIATION THERAPY | Age: 80
Discharge: HOME | End: 2020-08-11
Payer: MEDICARE

## 2020-08-11 PROCEDURE — 77387 GUIDANCE FOR RADJ TX DLVR: CPT

## 2020-08-11 PROCEDURE — 77412 RADIATION TX DELIVERY LVL 3: CPT

## 2020-08-12 ENCOUNTER — HOSPITAL ENCOUNTER (OUTPATIENT)
Dept: RADIATION THERAPY | Age: 80
Discharge: HOME | End: 2020-08-12
Payer: MEDICARE

## 2020-08-12 PROCEDURE — 77412 RADIATION TX DELIVERY LVL 3: CPT

## 2020-08-12 PROCEDURE — 77387 GUIDANCE FOR RADJ TX DLVR: CPT

## 2020-08-13 ENCOUNTER — HOSPITAL ENCOUNTER (OUTPATIENT)
Dept: RADIATION THERAPY | Age: 80
Discharge: HOME | End: 2020-08-13
Payer: MEDICARE

## 2020-08-13 PROCEDURE — 77412 RADIATION TX DELIVERY LVL 3: CPT

## 2020-08-13 PROCEDURE — 77336 RADIATION PHYSICS CONSULT: CPT

## 2020-08-13 PROCEDURE — 77387 GUIDANCE FOR RADJ TX DLVR: CPT

## 2020-08-14 ENCOUNTER — HOSPITAL ENCOUNTER (OUTPATIENT)
Dept: RADIATION THERAPY | Age: 80
Discharge: HOME | End: 2020-08-14
Payer: MEDICARE

## 2020-08-14 PROCEDURE — 77412 RADIATION TX DELIVERY LVL 3: CPT

## 2020-08-14 PROCEDURE — 77387 GUIDANCE FOR RADJ TX DLVR: CPT

## 2020-08-17 ENCOUNTER — HOSPITAL ENCOUNTER (OUTPATIENT)
Dept: RADIATION THERAPY | Age: 80
Discharge: HOME | End: 2020-08-17
Payer: MEDICARE

## 2020-08-17 PROCEDURE — 77387 GUIDANCE FOR RADJ TX DLVR: CPT

## 2020-08-17 PROCEDURE — 77412 RADIATION TX DELIVERY LVL 3: CPT

## 2020-08-18 ENCOUNTER — HOSPITAL ENCOUNTER (OUTPATIENT)
Dept: RADIATION THERAPY | Age: 80
Discharge: HOME | End: 2020-08-18
Payer: MEDICARE

## 2020-08-18 PROCEDURE — 77412 RADIATION TX DELIVERY LVL 3: CPT

## 2020-08-18 PROCEDURE — 77387 GUIDANCE FOR RADJ TX DLVR: CPT

## 2020-08-19 ENCOUNTER — HOSPITAL ENCOUNTER (OUTPATIENT)
Dept: RADIATION THERAPY | Age: 80
Discharge: HOME | End: 2020-08-19
Payer: MEDICARE

## 2020-08-19 PROCEDURE — 77387 GUIDANCE FOR RADJ TX DLVR: CPT

## 2020-08-19 PROCEDURE — 77412 RADIATION TX DELIVERY LVL 3: CPT

## 2020-08-20 ENCOUNTER — HOSPITAL ENCOUNTER (OUTPATIENT)
Dept: RADIATION THERAPY | Age: 80
Discharge: HOME | End: 2020-08-20
Payer: MEDICARE

## 2020-08-20 PROCEDURE — 77412 RADIATION TX DELIVERY LVL 3: CPT

## 2020-08-20 PROCEDURE — 77387 GUIDANCE FOR RADJ TX DLVR: CPT

## 2020-08-20 PROCEDURE — 77336 RADIATION PHYSICS CONSULT: CPT

## 2020-08-21 ENCOUNTER — HOSPITAL ENCOUNTER (OUTPATIENT)
Dept: RADIATION THERAPY | Age: 80
Discharge: HOME | End: 2020-08-21
Payer: MEDICARE

## 2020-08-21 PROCEDURE — 77387 GUIDANCE FOR RADJ TX DLVR: CPT

## 2020-08-21 PROCEDURE — 77412 RADIATION TX DELIVERY LVL 3: CPT

## 2020-08-24 ENCOUNTER — HOSPITAL ENCOUNTER (OUTPATIENT)
Dept: RADIATION THERAPY | Age: 80
Discharge: HOME | End: 2020-08-24
Payer: MEDICARE

## 2020-08-24 PROCEDURE — 77412 RADIATION TX DELIVERY LVL 3: CPT

## 2020-08-24 PROCEDURE — 77387 GUIDANCE FOR RADJ TX DLVR: CPT

## 2020-08-25 ENCOUNTER — HOSPITAL ENCOUNTER (OUTPATIENT)
Dept: RADIATION THERAPY | Age: 80
Discharge: HOME | End: 2020-08-25
Payer: MEDICARE

## 2020-08-25 PROCEDURE — 77387 GUIDANCE FOR RADJ TX DLVR: CPT

## 2020-08-25 PROCEDURE — 77412 RADIATION TX DELIVERY LVL 3: CPT

## 2020-08-26 ENCOUNTER — HOSPITAL ENCOUNTER (OUTPATIENT)
Dept: RADIATION THERAPY | Age: 80
Discharge: HOME | End: 2020-08-26
Payer: MEDICARE

## 2020-08-26 PROCEDURE — 77412 RADIATION TX DELIVERY LVL 3: CPT

## 2020-08-26 PROCEDURE — 77387 GUIDANCE FOR RADJ TX DLVR: CPT

## 2020-08-26 PROCEDURE — 77334 RADIATION TREATMENT AID(S): CPT

## 2020-08-26 PROCEDURE — 77307 TELETHX ISODOSE PLAN CPLX: CPT

## 2020-08-27 ENCOUNTER — HOSPITAL ENCOUNTER (OUTPATIENT)
Dept: RADIATION THERAPY | Age: 80
Discharge: HOME | End: 2020-08-27
Payer: MEDICARE

## 2020-08-27 PROCEDURE — 77412 RADIATION TX DELIVERY LVL 3: CPT

## 2020-08-27 PROCEDURE — 77336 RADIATION PHYSICS CONSULT: CPT

## 2020-08-27 PROCEDURE — 77387 GUIDANCE FOR RADJ TX DLVR: CPT

## 2020-08-28 ENCOUNTER — HOSPITAL ENCOUNTER (OUTPATIENT)
Dept: RADIATION THERAPY | Age: 80
Discharge: HOME | End: 2020-08-28
Payer: MEDICARE

## 2020-08-28 PROCEDURE — 77387 GUIDANCE FOR RADJ TX DLVR: CPT

## 2020-08-28 PROCEDURE — 77412 RADIATION TX DELIVERY LVL 3: CPT

## 2020-08-31 ENCOUNTER — HOSPITAL ENCOUNTER (OUTPATIENT)
Dept: RADIATION THERAPY | Age: 80
Discharge: HOME | End: 2020-08-31
Payer: MEDICARE

## 2020-08-31 PROCEDURE — 77280 THER RAD SIMULAJ FIELD SMPL: CPT

## 2020-08-31 PROCEDURE — 77412 RADIATION TX DELIVERY LVL 3: CPT

## 2020-09-01 ENCOUNTER — HOSPITAL ENCOUNTER (OUTPATIENT)
Dept: RADIATION THERAPY | Age: 80
Discharge: HOME | End: 2020-09-01
Payer: MEDICARE

## 2020-09-01 PROCEDURE — 77412 RADIATION TX DELIVERY LVL 3: CPT

## 2020-09-01 PROCEDURE — 77387 GUIDANCE FOR RADJ TX DLVR: CPT

## 2020-09-02 ENCOUNTER — HOSPITAL ENCOUNTER (OUTPATIENT)
Dept: RADIATION THERAPY | Age: 80
Discharge: HOME | End: 2020-09-02
Payer: MEDICARE

## 2020-09-02 PROCEDURE — 77387 GUIDANCE FOR RADJ TX DLVR: CPT

## 2020-09-02 PROCEDURE — 77412 RADIATION TX DELIVERY LVL 3: CPT

## 2020-09-03 ENCOUNTER — HOSPITAL ENCOUNTER (OUTPATIENT)
Dept: RADIATION THERAPY | Age: 80
Discharge: HOME | End: 2020-09-03
Payer: MEDICARE

## 2020-09-03 PROCEDURE — 77336 RADIATION PHYSICS CONSULT: CPT

## 2020-09-03 PROCEDURE — 77387 GUIDANCE FOR RADJ TX DLVR: CPT

## 2020-09-03 PROCEDURE — 77412 RADIATION TX DELIVERY LVL 3: CPT

## 2020-09-04 ENCOUNTER — HOSPITAL ENCOUNTER (OUTPATIENT)
Dept: RADIATION THERAPY | Age: 80
Discharge: HOME | End: 2020-09-04
Payer: MEDICARE

## 2020-09-04 PROCEDURE — 77387 GUIDANCE FOR RADJ TX DLVR: CPT

## 2020-09-04 PROCEDURE — 77412 RADIATION TX DELIVERY LVL 3: CPT

## 2020-09-08 ENCOUNTER — HOSPITAL ENCOUNTER (OUTPATIENT)
Dept: RADIATION THERAPY | Age: 80
Discharge: HOME | End: 2020-09-08
Payer: MEDICARE

## 2020-09-08 PROCEDURE — 77387 GUIDANCE FOR RADJ TX DLVR: CPT

## 2020-09-08 PROCEDURE — 77412 RADIATION TX DELIVERY LVL 3: CPT

## 2020-09-09 ENCOUNTER — HOSPITAL ENCOUNTER (OUTPATIENT)
Dept: RADIATION THERAPY | Age: 80
Discharge: HOME | End: 2020-09-09
Payer: MEDICARE

## 2020-09-09 PROCEDURE — 77387 GUIDANCE FOR RADJ TX DLVR: CPT

## 2020-09-09 PROCEDURE — 77412 RADIATION TX DELIVERY LVL 3: CPT

## 2020-09-21 ENCOUNTER — NURSE NAVIGATOR (OUTPATIENT)
Dept: OTHER | Age: 80
End: 2020-09-21

## 2020-09-21 NOTE — NURSE NAVIGATOR
.Survivorship care plan discussed with patient's daughter-Deana. All questions answered. Patient will call if needed in the future should she have any questions. Copy of plan mailed to patient and a copy faxed to PCP's office. No further f/u from navigator needed.

## 2020-09-22 ENCOUNTER — TELEPHONE (OUTPATIENT)
Dept: INTERNAL MEDICINE CLINIC | Age: 80
End: 2020-09-22

## 2020-09-22 DIAGNOSIS — I10 PRIMARY HYPERTENSION: Primary | ICD-10-CM

## 2020-09-22 NOTE — TELEPHONE ENCOUNTER
----- Message from Yomaira Jaffe sent at 9/22/2020  2:56 PM EDT -----  Regarding: Orders  These pt need lab orders fort 9/23 thank you

## 2020-09-23 ENCOUNTER — APPOINTMENT (OUTPATIENT)
Dept: INTERNAL MEDICINE CLINIC | Age: 80
End: 2020-09-23

## 2020-09-23 ENCOUNTER — HOSPITAL ENCOUNTER (OUTPATIENT)
Dept: LAB | Age: 80
Discharge: HOME | End: 2020-09-23
Payer: MEDICARE

## 2020-09-23 DIAGNOSIS — I10 PRIMARY HYPERTENSION: ICD-10-CM

## 2020-09-23 LAB
ALBUMIN SERPL-MCNC: 4 G/DL (ref 3.4–5)
ALBUMIN/GLOB SERPL: 1.3 {RATIO} (ref 0.8–1.7)
ALP SERPL-CCNC: 94 U/L (ref 45–117)
ALT SERPL-CCNC: 31 U/L (ref 13–56)
ANION GAP SERPL CALC-SCNC: 7 MMOL/L (ref 3–18)
AST SERPL-CCNC: 24 U/L (ref 10–38)
BILIRUB SERPL-MCNC: 0.8 MG/DL (ref 0.2–1)
BUN SERPL-MCNC: 15 MG/DL (ref 7–18)
BUN/CREAT SERPL: 16 (ref 12–20)
CALCIUM SERPL-MCNC: 9.2 MG/DL (ref 8.5–10.1)
CHLORIDE SERPL-SCNC: 104 MMOL/L (ref 100–111)
CO2 SERPL-SCNC: 28 MMOL/L (ref 21–32)
CREAT SERPL-MCNC: 0.94 MG/DL (ref 0.6–1.3)
ERYTHROCYTE [DISTWIDTH] IN BLOOD BY AUTOMATED COUNT: 14.7 % (ref 11.6–14.5)
GLOBULIN SER CALC-MCNC: 3.2 G/DL (ref 2–4)
GLUCOSE SERPL-MCNC: 89 MG/DL (ref 74–99)
HCT VFR BLD AUTO: 44 % (ref 35–45)
HGB BLD-MCNC: 14.4 G/DL (ref 12–16)
MCH RBC QN AUTO: 30.2 PG (ref 24–34)
MCHC RBC AUTO-ENTMCNC: 32.7 G/DL (ref 31–37)
MCV RBC AUTO: 92.2 FL (ref 74–97)
PLATELET # BLD AUTO: 240 K/UL (ref 135–420)
PMV BLD AUTO: 10.7 FL (ref 9.2–11.8)
POTASSIUM SERPL-SCNC: 4.1 MMOL/L (ref 3.5–5.5)
PROT SERPL-MCNC: 7.2 G/DL (ref 6.4–8.2)
RBC # BLD AUTO: 4.77 M/UL (ref 4.2–5.3)
SODIUM SERPL-SCNC: 139 MMOL/L (ref 136–145)
WBC # BLD AUTO: 7 K/UL (ref 4.6–13.2)

## 2020-09-23 PROCEDURE — 80053 COMPREHEN METABOLIC PANEL: CPT

## 2020-09-23 PROCEDURE — 36415 COLL VENOUS BLD VENIPUNCTURE: CPT

## 2020-09-23 PROCEDURE — 85027 COMPLETE CBC AUTOMATED: CPT

## 2020-09-29 NOTE — PROGRESS NOTES
This is a Subsequent Medicare Annual Wellness Visit     I have reviewed the patient's medical history in detail and updated the computerized patient record. History     Past Medical History:   Diagnosis Date    Allergic rhinitis     Breast cancer ([address] 03/2020    Dr Long Weber; ER/WA+, HER-2 neg infiltrating ductal ca 2 sites LEFT breast; Dr Sonido Salas adenoma     1/15 Dr Otho Boeck; polyp 2006 Dr Sweta Roberto COPD     on films    DJD (degenerative joint disease)     Dyslipidemia     FHx: heart disease     GERD (gastroesophageal reflux disease)     s/p dilation Dr. Codie Greene 2006; [facility] and esophagitis 1/15 Dr Otho Boeck    Monoclonal gammopathy 2011 Dr. Kia Friend Osteoporosis Dr. Cindy Washburn, t score -0.6 spine, -2.7 hip     took forteo 2012 to 2014 Dr Herring    Recurrent BCC (basal cell carcinoma)     Dr. Koroma Belton dependence syndrome       Past Surgical History:   Procedure Laterality Date   [address]    neg RIGHT breast biopsy Dr. Kiesha Oden HX BREAST LUMPECTOMY Left 06/30/2020    HX BREAST LUMPECTOMY Left 6/30/2020    Left Sentinal Node Biopsy performed by [facility]i [facility]bs [facility] at [facility]      Dr. Codie Greene 2006 polyp; Dr Otho Boeck 1/15 adenoma    HX HYSTERECTOMY  1970s    for precancerous lesions in the cervix    HX MASTECTOMY Left 6/30/2020    Left Partial Mastectomy with two site localization performed by Robi Hobbs MD at [facility] MAIN OR    HX ORTHOPAEDIC      DEXA t score 0.32 spine, -2.28 hip FRAX 5.3 hip Dr Cindy Washburn (12/13); spine 0.68, hip -2.51 w FRAX 7.2 (12/15)    HX ORTHOPAEDIC  05/2017    RIGHT CTS release and thumb surgery Dr Devi Kathleen      s/p c spine surgery [facility] 2010?     NEUROLOGICAL PROCEDURE UNLISTED  12/2017    MMSE 30/30    [place] BIOPSY OF BREAST, NEEDLE CORE Left 03/2020    seeing Dr Long Weber     Current Outpatient Medications   Medication Sig Dispense Refill    letrozole [facility]) 2.5 mg tablet Take 2.5 mg by mouth daily.  amLODIPine (NORVASC) 5 mg tablet Take 1 Tab by mouth daily. 90 Tab 3    atorvastatin (LIPITOR) 80 mg tablet Take 1 Tab by mouth daily. 90 Tab 3    ezetimibe (Zetia) 10 mg tablet Take 1 Tab by mouth daily. 90 Tab 3    tiotropium (Spiriva with HandiHaler) 18 mcg inhalation capsule Take 1 Cap by inhalation daily. 90 Cap 3    CALCIUM CARBONATE (CALCIUM 500 PO) Take  by mouth daily.  albuterol (PROVENTIL HFA, VENTOLIN HFA, PROAIR HFA) 90 mcg/actuation inhaler Take 1 Puff by inhalation every six (6) hours as needed for Wheezing. 1 Inhaler 5    fluticasone (FLONASE) 50 mcg/actuation nasal spray 2 Sprays by Both Nostrils route daily. (Patient taking differently: 2 Sprays by Both Nostrils route as needed.) 3 Bottle 3    Cholecalciferol, Vitamin D3, (VITAMIN D3) 1,000 unit Cap Take 1,000 Units by mouth daily.  aspirin delayed-release 81 mg tablet Take 81 mg by mouth daily.  denosumab (PROLIA) 60 mg/mL injection 60 mg by SubCUTAneous route every 6 months.  cyanocobalamin 1,000 mcg tablet Take 1,000 mcg by mouth daily.        Allergies   Allergen Reactions    Penicillins Rash and Hives     Family History   Problem Relation Age of Onset    Heart Attack Father     Kidney Disease Mother         ADPKD    Diabetes Sister     Cancer Sister         leukemia     Social History     Tobacco Use    Smoking status: Current Every Day Smoker     Packs/day: 1.00     Years: 50.00     Pack years: 50.00     Types: Cigarettes    Smokeless tobacco: Never Used   Substance Use Topics    Alcohol use: No     Depression Risk Factor Screening:     3 most recent PHQ Screens 9/30/2020   Little interest or pleasure in doing things Not at all   Feeling down, depressed, irritable, or hopeless Not at all   Total Score PHQ 2 0     SCREENINGS  Colonoscopy last done 1/15 Dr Rashad Friend last done 6/20  DEXA last done 8/18 Dr Letty Dhillon last done >5 yrs ago Immunization History   Administered Date(s) Administered    (RETIRED) Pneumococcal Vaccine (Unspecified Type) 11/03/2006    Influenza High Dose Vaccine PF 12/01/2016, 12/22/2017    Influenza Vaccine 09/12/2013, 10/22/2014, 12/08/2015    Influenza Vaccine Split 12/02/2011    Influenza Vaccine Whole 12/18/2008    Influenza, Quadrivalent, Adjuvanted (>65 Yrs FLUAD QUAD [id number]) 09/30/2020    Pneumococcal Conjugate (PCV-13) 05/19/2016    Pneumococcal Polysaccharide (PPSV-23) 09/26/2019    TDAP Vaccine 05/25/2011    Zoster 01/01/2008    Zoster Recombinant 09/26/2019, 11/26/2019     Alcohol Risk Factor Screening: On any occasion during the past 3 months, have you had more than 3 drinks containing alcohol? No    Do you average more than 7 drinks per week? No      Functional Ability and Level of Safety:     Hearing Loss   Moderate Dr Bety Locke - no acute complaints and is followed by [facility]    Activities of Daily Living   Self-care. Requires assistance with: no ADLs    Fall Risk     Fall Risk Assessment, last 12 mths 8/5/2020   Able to walk? Yes   Fall in past 12 months? No     Abuse Screen   Patient is not abused    Review of Systems   A comprehensive review of systems was negative except for that written in the HPI.     Physical Examination     Visit Vitals  BP (!) 146/82   Pulse 79   Temp 97.7 °F (36.5 °C) (Temporal)   Resp 14   Ht 5' 2\" (1.575 m)   Wt 124 lb (56.2 kg)   SpO2 99%   BMI 22.68 kg/m²       Evaluation of Cognitive Function:  Mood/affect:  neutral  Appearance: age appropriate, well dressed and within normal Limits  Family member/caregiver input: na    Patient Care Team:  Anabel Medina MD as PCP - General (Internal Medicine)  Anabel Medina MD as PCP - [facility]  Benito Dill RN as [facility] (Internal Medicine)  Rosalinda Herring MD (Rheumatology)  Fabiano Naranjo MD (Breast Surgery)  Sylvie Espinoza as Nurse Navigator (Oncology)    Advice/Referrals/Counseling   Education and counseling provided:  Are appropriate based on today's review and evaluation  End-of-Life planning (with patient's consent)  Pneumococcal Vaccine  Screening Mammography  Colorectal cancer screening tests  Cardiovascular screening blood test  Bone mass measurement (DEXA)  Diabetes screening test    Assessment/Plan       ICD-10-CM ICD-9-CM    1. Medicare annual wellness visit, subsequent  Z00.00 V70.0    2. Needs flu shot  Z23 V04.81 FLU (FLUAD QUAD INFLUENZA VACCINE,QUAD,ADJUVANTED)   3. B12 deficiency  E53.8 266.2 VITAMIN B12 & FOLATE   4. Dyslipidemia  E78.5 272.4 TSH AND FREE T4   5. Primary hypertension  I10 401.9    6. Gastroesophageal reflux disease without esophagitis  K21.9 530.81    7. Hyperplastic colonic polyp, unspecified part of colon  K63.5 211.3    8. Chronic obstructive pulmonary disease, unspecified COPD type ([facility]  J44.9 496    9. Age-related osteoporosis without current pathological fracture  M81.0 733.01    10. Malignant neoplasm of overlapping sites of left breast in female, estrogen receptor positive ([facility]  C50.812 174.8     Z17.0 V86.0    11. Advanced directives, counseling/discussion  Z71.89 V65.49 ADVANCE CARE PLANNING FIRST 30 MINS   12. Screening for depression  Z13.31 V79.0 [facility]   13. Screening for diabetes mellitus  Z13.1 V77.1    14. Memory problem  R41.3 780.93 REFERRAL TO NEUROLOGY     current treatment plan is effective  lab results and schedule of future lab studies reviewed with patient. End of life discussion undertaken.   Will work on getting amd in place  Colonoscopy beyond age  Mammo 2021  DEXA per Dr Jazmine galicia advocated

## 2020-09-29 NOTE — PROGRESS NOTES
79 y.o. WHITE OR  female who presents for f/u    No cardiovascular complaints, no exercise but remains active with household chores. No other execise    Averaging 1 ppd, not wanting anything for smoking cessation. Using prn alb but fairly consistent w spiriva    Denies any GI or  complaints     She continues to f/u with Dr Isha Poole and Dr Morris Salamanca and now on femara. She is not on prolia currently    She is asking to be worked up for memory problems.   She is very forgetful now; her daughter is in charge fo the bill paying, forgets where she puts things, conversations, not getting lost    [address]: 4/30/14, 5/1/15, 5/19/16, 5/16/17, 9/26/19, 9/30/20    Past Medical History:   Diagnosis Date    Allergic rhinitis     Breast cancer ([facility] [address] [address] 03/2020    Dr Isha Poole; ER/ME+, HER-2 neg infiltrating ductal ca 2 sites LEFT breast; Dr Cunningham Rout adenoma     1/15 Dr Tony Ley; polyp 2006 Dr Brenton Tolliver COPD     on films    DJD (degenerative joint disease)     Dyslipidemia     FHx: heart disease     GERD (gastroesophageal reflux disease)     s/p dilation Dr. Pamlea Avelar 2006; [place] and esophagitis 1/15 Dr Tony Ley    Monoclonal gammopathy 2011 Dr. Brina Mello Osteoporosis Dr. Eleni Rogers, t score -0.6 spine, -2.7 hip     took forteo 2012 to 2014 Dr Herring    Recurrent BCC (basal cell carcinoma)     Dr. Walton Grayson dependence syndrome      Past Surgical History:   Procedure Laterality Date   [address]    neg RIGHT breast biopsy Dr. Marsha Monique HX BREAST LUMPECTOMY Left 6/30/2020    Left Sentinal Node Biopsy performed by Ольга Delgado MD at [facility]      Dr. Pamela Avelar 2006 polyp; Dr Tony Ley 1/15 adenoma    HX HYSTERECTOMY  1970s    for precancerous lesions in the cervix    HX MASTECTOMY Left 6/30/2020    Left Partial Mastectomy with two site localization performed by Ольга Delgado MD at [address] [place] [address] t score 0.32 spine, -2.28 hip FRAX 5.3 hip Dr Cindy Washburn (12/13); spine 0.68, hip -2.51 w FRAX 7.2 (12/15)    HX ORTHOPAEDIC  05/2017    RIGHT CTS release and thumb surgery Dr Devi Kathleen      s/p c spine surgery [facility] 2010?  NEUROLOGICAL PROCEDURE UNLISTED      MMSE 30/30 (12/17);   MMSE 20/30 (9/20)    [place] BIOPSY OF BREAST, NEEDLE CORE Left 03/2020    seeing Dr Dahlia Nunez History     Socioeconomic History    Marital status:      Spouse name: Not on file    Number of children: 3    Years of education: Not on file    Highest education level: Not on file   Occupational History    Occupation: retired [employer]   Social Needs    Financial resource strain: Not on file    Food insecurity     Worry: Not on file     Inability: Not on file   [employer] needs     Medical: Not on file     Non-medical: Not on file   Tobacco Use    Smoking status: Current Every Day Smoker     Packs/day: 1.00     Years: 50.00     Pack years: 50.00     Types: Cigarettes    Smokeless tobacco: Never Used   Substance and Sexual Activity    Alcohol use: No    Drug use: No    Sexual activity: Not on file   Lifestyle    Physical activity     Days per week: Not on file     Minutes per session: Not on file    Stress: Not on file   Relationships    Social connections     Talks on phone: Not on file     Gets together: Not on file     Attends Jain service: Not on file     Active member of club or organization: Not on file     Attends meetings of clubs or organizations: Not on file     Relationship status: Not on file    Intimate partner violence     Fear of current or ex partner: Not on file     Emotionally abused: Not on file     Physically abused: Not on file     Forced sexual activity: Not on file   Other Topics Concern    Not on file   Social History Narrative    Not on file     Allergies   Allergen Reactions    Penicillins Rash and Hives       Current Outpatient Medications Medication Sig    letrozole (FEMARA) 2.5 mg tablet Take 2.5 mg by mouth daily.  amLODIPine (NORVASC) 5 mg tablet Take 1 Tab by mouth daily.  atorvastatin (LIPITOR) 80 mg tablet Take 1 Tab by mouth daily.  ezetimibe (Zetia) 10 mg tablet Take 1 Tab by mouth daily.  tiotropium (Spiriva with HandiHaler) 18 mcg inhalation capsule Take 1 Cap by inhalation daily.  CALCIUM CARBONATE (CALCIUM 500 PO) Take  by mouth daily.  albuterol (PROVENTIL HFA, VENTOLIN HFA, PROAIR HFA) 90 mcg/actuation inhaler Take 1 Puff by inhalation every six (6) hours as needed for Wheezing.  fluticasone (FLONASE) 50 mcg/actuation nasal spray 2 Sprays by Both Nostrils route daily. (Patient taking differently: 2 Sprays by Both Nostrils route as needed.)    Cholecalciferol, Vitamin D3, (VITAMIN D3) 1,000 unit Cap Take 1,000 Units by mouth daily.  aspirin delayed-release 81 mg tablet Take 81 mg by mouth daily. No current facility-administered medications for this visit. REVIEW OF SYSTEMS:  S/p hyst, mammo 3/20, DEXA 12/15 Dr Neha Faye, colo 1/15 Dr Radha Ferris, sees Dr Cristian Mohan and Dr Elis Marks  Ophtho - no vision change or eye pain  Oral - no mouth pain, tongue or tooth problems  Ears - no hearing loss, ear pain, fullness, no swallowing problems  Cardiac - no CP, PND, orthopnea, edema, palpitations or syncope  Chest - no breast masses  Resp - no wheezing, chronic coughing, dyspnea  GI - no heartburn, nausea, vomiting, change in bowel habits, bleeding, hemorrhoids  Urinary - no dysuria, hematuria, flank pain, urgency, frequency    Visit Vitals  BP (!) 146/82   Pulse 79   Temp 97.7 °F (36.5 °C) (Temporal)   Resp 14   Ht 5' 2\" (1.575 m)   Wt 124 lb (56.2 kg)   SpO2 99%   BMI 22.68 kg/m²     A&O x3  Affect is appropriate. Mood stable  No apparent distress  Anicteric, no JVD, adenopathy or thyromegaly.    No carotid bruits or radiated murmur  Lungs clear to auscultation, no wheezes or rales  Heart showed regular rate and rhythm. No murmur, rubs, gallops  Abdomen soft nontender, no hepatosplenomegaly or masses. Extremities without edema. Pulses 1-2+ symmetrically.       LABS  From 5/11 showed   gluc 90,   cr 0.70,    alt 21,          chol 149, tg 100, hdl 29, ldl-c 100, wbc 9.8, hb 14.2, plt 276  From 10/11 showed gluc 100, cr 1.04, gfr 55  From 11/11 showed                   ldl-p 1685, chol 157, tg 116, hdl 41, ldl-c 93,   spep+  From 3/12 showed   gluc 91,   cr 0.84, gfr 70,  alt 17, ldl-p 1309, chol 126, tg 86,   hdl 37, ldl-c 72  From 8/12 showed   gluc 95,   cr 0.86, gfr 68,  alt 21  From 3/13 showed                   ldl-p 1281, chol 118, tg 93,   hdl 40, ldl-c 59  From 9/13 showed   gluc 92,   cr 0.82, gfr 72,  alt 19, ldl-p 1268, chol 136, tg 71,   hdl 42, ldl-c 80,   wbc 7.7, hb 13.4, plt 310, ua neg pro  From 3/14 showed   gluc 92,   cr 0.88, gfr>60, alt<5,          chol 122, tg 104, hdl 35, ldl-c 66,   wbc 7.5, hb 12.7, plt 312, ua neg  From 10/14 showed               chol 140, tg 100, hdl 36, ldl-c 84  From 4/15 showed   gluc 94,   cr 0.96, gfr>60, alt 6,          wbc 9.3, hb 14.0, plt 313  From 11/15 showed               chol 144, tg 79,   hdl 44, ldl-c 84  From 5/16 showed   gluc 82,   cr 0.82, gfr>60, alt 26,          wbc 5.3, hb 14.6, plt 241  From 11/16 showed               chol 217, tg 165, hdl 47, ldl-c 137  From 5/17 showed   gluc 90,   cr 0.75, gfr>60, alt 29,          wbc 7.4, hb 14.7, plt 224  From 6/17 showed   gluc 86,   cr 0.86, gfr>60,           wbc 8.4, hb 15.4, plt 235  From 9/17 showed   gluc 80,   cr 0.81, gfr 71,  alt 18,          wbc 8.4, hb 15.1, plt 267, fe 113, %sat 37, b12 959, FERNY+ igg mono lambda light chain, vit d 41.5  From 12/17 showed               chol 109, tg 73,   hdl 49, ldl-c 45  From 8/18 showed   gluc 89,   cr 0.78, gfr>60,           chol 120, tg 80,   hdl 48, ldl-c 56  From 4/19 showed                  wbc 8.8, hb 14.5, plt 259  From 9/19 showed   gluc 85,   cr 0.81, gfr>60, alt 23,          chol 136, tg 92,   hdl 46, ldl-c 71  From 3/20 showed               wbc 7.6, hb 14.8, plt 266  From 6/20 showed   gluc 90,   cr 0.92, gfr 59,  alt 24,          chol 164, tg 91,   hdl 48, ldl-c 98    Results for orders placed or performed during the hospital encounter of 33/60/27   METABOLIC PANEL, COMPREHENSIVE   Result Value Ref Range    Sodium 139 136 - 145 mmol/L    Potassium 4.1 3.5 - 5.5 mmol/L    Chloride 104 100 - 111 mmol/L    CO2 28 21 - 32 mmol/L    Anion gap 7 3.0 - 18 mmol/L    Glucose 89 74 - 99 mg/dL    BUN 15 7.0 - 18 MG/DL    Creatinine 0.94 0.6 - 1.3 MG/DL    BUN/Creatinine ratio 16 12 - 20      GFR est AA >60 >60 ml/min/1.73m2    GFR est non-AA 57 (L) >60 ml/min/1.73m2    Calcium 9.2 8.5 - 10.1 MG/DL    Bilirubin, total 0.8 0.2 - 1.0 MG/DL    ALT (SGPT) 31 13 - 56 U/L    AST (SGOT) 24 10 - 38 U/L    Alk.  phosphatase 94 45 - 117 U/L    Protein, total 7.2 6.4 - 8.2 g/dL    Albumin 4.0 3.4 - 5.0 g/dL    Globulin 3.2 2.0 - 4.0 g/dL    A-G Ratio 1.3 0.8 - 1.7     CBC W/O DIFF   Result Value Ref Range    WBC 7.0 4.6 - 13.2 K/uL    RBC 4.77 4.20 - 5.30 M/uL    HGB 14.4 12.0 - 16.0 g/dL    HCT 44.0 35.0 - 45.0 %    MCV 92.2 74.0 - 97.0 FL    MCH 30.2 24.0 - 34.0 PG    MCHC 32.7 31.0 - 37.0 g/dL    RDW 14.7 (H) 11.6 - 14.5 %    PLATELET 806 565 - 318 K/uL    MPV 10.7 9.2 - 11.8 FL     We reviewed the patient's labs from the last several visits to point out trends in the numbers      Patient Active Problem List   Diagnosis Code    Colon polyps Dr. Maldonado Michaels 2006 K63.5    Osteoporosis Dr. Khalida Ferris M81.0    Monoclonal gammopathy 2011 Dr. Efra Garcia D47.2    COPD on films J44.9    Dyslipidemia E78.5    Arthritis, degenerative M19.90    Gastroesophageal reflux disease without esophagitis K21.9    Advance directive discussed with patient Z71.89    Tobacco dependence syndrome F17.200    Primary hypertension I10    Cancer of overlapping sites of left female breast ([facility] [address] R22.352     ASSESSMENT AND PLAN:  1. Dyslipidemia. Continue current regimen  2. GERD. Daily PPI and avoidance measures  3. DJD. Continue prn otc meds  4. Colon polyps. Fiber  5. COPD. Continue current. 6.  Osteoporosis. F/U Dr. Cindy Washburn as sched  7. MGUS. F/U Dr. Georgie Shay  8. Allergies. F/U katty Griffith cont  9. Smoking cessation. Declined meds  10. HTN. Continue current regimen. 11.  Breast ca. Per Dr Long Weber and Dr Georgie Shay  12. Memory problems. MMSE 20/30. Long discussion  mmse as above. Check labs, send to neuro and will defer imaging to them        RTC 9/21    Above conditions discussed at length and patient vocalized understanding.   All questions answered to patient satisfaction

## 2020-09-30 ENCOUNTER — OFFICE VISIT (OUTPATIENT)
Dept: INTERNAL MEDICINE CLINIC | Age: 80
End: 2020-09-30
Payer: MEDICARE

## 2020-09-30 ENCOUNTER — HOSPITAL ENCOUNTER (OUTPATIENT)
Dept: LAB | Age: 80
Discharge: HOME | End: 2020-09-30
Payer: MEDICARE

## 2020-09-30 VITALS
HEIGHT: 62 IN | SYSTOLIC BLOOD PRESSURE: 146 MMHG | TEMPERATURE: 97.7 F | WEIGHT: 124 LBS | HEART RATE: 79 BPM | RESPIRATION RATE: 14 BRPM | DIASTOLIC BLOOD PRESSURE: 82 MMHG | BODY MASS INDEX: 22.82 KG/M2 | OXYGEN SATURATION: 99 %

## 2020-09-30 DIAGNOSIS — Z13.1 SCREENING FOR DIABETES MELLITUS: ICD-10-CM

## 2020-09-30 DIAGNOSIS — I10 PRIMARY HYPERTENSION: ICD-10-CM

## 2020-09-30 DIAGNOSIS — R41.3 MEMORY PROBLEM: ICD-10-CM

## 2020-09-30 DIAGNOSIS — E53.8 B12 DEFICIENCY: ICD-10-CM

## 2020-09-30 DIAGNOSIS — C50.812 MALIGNANT NEOPLASM OF OVERLAPPING SITES OF LEFT BREAST IN FEMALE, ESTROGEN RECEPTOR POSITIVE (HCC): ICD-10-CM

## 2020-09-30 DIAGNOSIS — Z71.89 ADVANCED DIRECTIVES, COUNSELING/DISCUSSION: ICD-10-CM

## 2020-09-30 DIAGNOSIS — Z23 NEEDS FLU SHOT: ICD-10-CM

## 2020-09-30 DIAGNOSIS — J44.9 CHRONIC OBSTRUCTIVE PULMONARY DISEASE, UNSPECIFIED COPD TYPE (HCC): ICD-10-CM

## 2020-09-30 DIAGNOSIS — Z13.31 SCREENING FOR DEPRESSION: ICD-10-CM

## 2020-09-30 DIAGNOSIS — Z17.0 MALIGNANT NEOPLASM OF OVERLAPPING SITES OF LEFT BREAST IN FEMALE, ESTROGEN RECEPTOR POSITIVE (HCC): ICD-10-CM

## 2020-09-30 DIAGNOSIS — K21.9 GASTROESOPHAGEAL REFLUX DISEASE WITHOUT ESOPHAGITIS: ICD-10-CM

## 2020-09-30 DIAGNOSIS — Z00.00 MEDICARE ANNUAL WELLNESS VISIT, SUBSEQUENT: Primary | ICD-10-CM

## 2020-09-30 DIAGNOSIS — E78.5 DYSLIPIDEMIA: ICD-10-CM

## 2020-09-30 DIAGNOSIS — M81.0 AGE-RELATED OSTEOPOROSIS WITHOUT CURRENT PATHOLOGICAL FRACTURE: ICD-10-CM

## 2020-09-30 DIAGNOSIS — K63.5 HYPERPLASTIC COLONIC POLYP, UNSPECIFIED PART OF COLON: ICD-10-CM

## 2020-09-30 LAB
FOLATE SERPL-MCNC: >20 NG/ML (ref 3.1–17.5)
T4 FREE SERPL-MCNC: 1.1 NG/DL (ref 0.7–1.5)
TSH SERPL DL<=0.05 MIU/L-ACNC: 1.82 UIU/ML (ref 0.36–3.74)
VIT B12 SERPL-MCNC: 297 PG/ML (ref 211–911)

## 2020-09-30 PROCEDURE — G0439 PPPS, SUBSEQ VISIT: HCPCS | Performed by: INTERNAL MEDICINE

## 2020-09-30 PROCEDURE — G8754 DIAS BP LESS 90: HCPCS | Performed by: INTERNAL MEDICINE

## 2020-09-30 PROCEDURE — 99497 ADVNCD CARE PLAN 30 MIN: CPT | Performed by: INTERNAL MEDICINE

## 2020-09-30 PROCEDURE — 90471 IMMUNIZATION ADMIN: CPT | Performed by: INTERNAL MEDICINE

## 2020-09-30 PROCEDURE — 1101F PT FALLS ASSESS-DOCD LE1/YR: CPT | Performed by: INTERNAL MEDICINE

## 2020-09-30 PROCEDURE — G8510 SCR DEP NEG, NO PLAN REQD: HCPCS | Performed by: INTERNAL MEDICINE

## 2020-09-30 PROCEDURE — G8753 SYS BP > OR = 140: HCPCS | Performed by: INTERNAL MEDICINE

## 2020-09-30 PROCEDURE — G0444 DEPRESSION SCREEN ANNUAL: HCPCS | Performed by: INTERNAL MEDICINE

## 2020-09-30 PROCEDURE — 84439 ASSAY OF FREE THYROXINE: CPT

## 2020-09-30 PROCEDURE — G8420 CALC BMI NORM PARAMETERS: HCPCS | Performed by: INTERNAL MEDICINE

## 2020-09-30 PROCEDURE — G8536 NO DOC ELDER MAL SCRN: HCPCS | Performed by: INTERNAL MEDICINE

## 2020-09-30 PROCEDURE — 90694 VACC AIIV4 NO PRSRV 0.5ML IM: CPT

## 2020-09-30 PROCEDURE — 1090F PRES/ABSN URINE INCON ASSESS: CPT | Performed by: INTERNAL MEDICINE

## 2020-09-30 PROCEDURE — 82746 ASSAY OF FOLIC ACID SERUM: CPT

## 2020-09-30 PROCEDURE — 99215 OFFICE O/P EST HI 40 MIN: CPT | Performed by: INTERNAL MEDICINE

## 2020-09-30 PROCEDURE — 36415 COLL VENOUS BLD VENIPUNCTURE: CPT

## 2020-09-30 PROCEDURE — G0463 HOSPITAL OUTPT CLINIC VISIT: HCPCS | Performed by: INTERNAL MEDICINE

## 2020-09-30 PROCEDURE — G8427 DOCREV CUR MEDS BY ELIG CLIN: HCPCS | Performed by: INTERNAL MEDICINE

## 2020-09-30 NOTE — PROGRESS NOTES
Jackie Julian 1940 female who presents for routine immunizations. Patient denies any symptoms , reactions or allergies that would exclude them from being immunized today. Risks and adverse reactions were discussed and the VIS was given to them. All questions were addressed. Order placed for influenza,  per Verbal Order from [address] with read back. Patient was observed for 15 min post injection. There were no reactions observed. Ruben Blevins LPN     Recent Travel Screening and Travel History documentation     Travel Screening     Question   Response    In the last month, have you been in contact with someone who was confirmed or suspected to have Coronavirus / COVID-19? No / Unsure    Have you had a COVID-19 viral test in the last 14 days? No    Do you have any of the following new or worsening symptoms? None of these    Have you traveled internationally in the last month?   No      Travel History   Travel since 08/30/20     No documented travel since 08/30/20

## 2020-09-30 NOTE — PATIENT INSTRUCTIONS
Vaccine Information Statement    Influenza (Flu) Vaccine (Inactivated or Recombinant): What You Need to Know    Many Vaccine Information Statements are available in Korean and other languages. See www.immunize.org/vis  Hojas de información sobre vacunas están disponibles en español y en muchos otros idiomas. Visite www.immunize.org/vis    1. Why get vaccinated? Influenza vaccine can prevent influenza (flu). Flu is a contagious disease that spreads around the [facility] every year, usually between October and May. Anyone can get the flu, but it is more dangerous for some people. Infants and young children, people 72years of age and older, pregnant women, and people with certain health conditions or a weakened immune system are at greatest risk of flu complications. Pneumonia, bronchitis, sinus infections and ear infections are examples of flu-related complications. If you have a medical condition, such as heart disease, cancer or diabetes, flu can make it worse. Flu can cause fever and chills, sore throat, muscle aches, fatigue, cough, headache, and runny or stuffy nose. Some people may have vomiting and diarrhea, though this is more common in children than adults. Each year thousands of people in the [facility] die from flu, and many more are hospitalized. Flu vaccine prevents millions of illnesses and flu-related visits to the doctor each year. 2. Influenza vaccines     CDC recommends everyone 10months of age and older get vaccinated every flu season. Children 6 months through 6years of age may need 2 doses during a single flu season. Everyone else needs only 1 dose each flu season. It takes about 2 weeks for protection to develop after vaccination. There are many flu viruses, and they are always changing. Each year a new flu vaccine is made to protect against three or four viruses that are likely to cause disease in the upcoming flu season.  Even when the vaccine doesnt exactly match these viruses, it may still provide some protection. Influenza vaccine does not cause flu. Influenza vaccine may be given at the same time as other vaccines. 3. Talk with your health care provider    Tell your vaccine provider if the person getting the vaccine:   Has had an allergic reaction after a previous dose of influenza vaccine, or has any severe, life-threatening allergies.  Has ever had Guillain-Barré Syndrome (also called GBS). In some cases, your health care provider may decide to postpone influenza vaccination to a future visit. People with minor illnesses, such as a cold, may be vaccinated. People who are moderately or severely ill should usually wait until they recover before getting influenza vaccine. Your health care provider can give you more information. 4. Risks of a reaction     Soreness, redness, and swelling where shot is given, fever, muscle aches, and headache can happen after influenza vaccine.  There may be a very small increased risk of Guillain-Barré Syndrome (GBS) after inactivated influenza vaccine (the flu shot). Mattly Low children who get the flu shot along with pneumococcal vaccine (PCV13), and/or DTaP vaccine at the same time might be slightly more likely to have a seizure caused by fever. Tell your health care provider if a child who is getting flu vaccine has ever had a seizure. People sometimes faint after medical procedures, including vaccination. Tell your provider if you feel dizzy or have vision changes or ringing in the ears. As with any medicine, there is a very remote chance of a vaccine causing a severe allergic reaction, other serious injury, or death. 5. What if there is a serious problem? An allergic reaction could occur after the vaccinated person leaves the clinic.  If you see signs of a severe allergic reaction (hives, swelling of the face and throat, difficulty breathing, a fast heartbeat, dizziness, or weakness), call 9-1-1 and get the person to the nearest hospital.    For other signs that concern you, call your health care provider. Adverse reactions should be reported to the Vaccine Adverse Event Reporting System (VAERS). Your health care provider will usually file this report, or you can do it yourself. Visit the VAERS website at www.vaers. hhs.gov or call 7-860.564.1823. VAERS is only for reporting reactions, and VAERS staff do not give medical advice. 6. The National Vaccine Injury Compensation Program    The [facility] Vaccine Injury Compensation Program (VICP) is a federal program that was created to compensate people who may have been injured by certain vaccines. Visit the VICP website at www.[facility].gov/vaccinecompensation or call 8-188.177.2073 to learn about the program and about filing a claim. There is a time limit to file a claim for compensation. 7. How can I learn more?  Ask your health care provider.  Call your local or state health department.  Contact the Centers for Disease Control and Prevention (CDC):  - Call 9-313.483.6167 (9-184-YQF-INFO) or  - Visit CDCs influenza website at www.cdc.gov/flu    Vaccine Information Statement (Interim)  Inactivated Influenza Vaccine   8/15/2019  43 AMANDA Sybil Og 860QF-05   Department of Health and Human Services  Centers for Disease Control and Prevention    Office Use Only      Medicare Wellness Visit, Female     The best way to live healthy is to have a lifestyle where you eat a well-balanced diet, exercise regularly, limit alcohol use, and quit all forms of tobacco/nicotine, if applicable. Regular preventive services are another way to keep healthy. Preventive services (vaccines, screening tests, monitoring & exams) can help personalize your care plan, which helps you manage your own care. Screening tests can find health problems at the earliest stages, when they are easiest to treat.    [facility] follows the current, evidence-based guidelines published by the [facility] (USPSTF) when recommending preventive services for our patients. Because we follow these guidelines, sometimes recommendations change over time as research supports it. (For example, mammograms used to be recommended annually. Even though Medicare will still pay for an annual mammogram, the newer guidelines recommend a mammogram every two years for women of average risk). Of course, you and your doctor may decide to screen more often for some diseases, based on your risk and your co-morbidities (chronic disease you are already diagnosed with). Preventive services for you include:  - Medicare offers their members a free annual wellness visit, which is time for you and your primary care provider to discuss and plan for your preventive service needs. Take advantage of this benefit every year!  -All adults over the age of 72 should receive the recommended pneumonia vaccines. Current USPSTF guidelines recommend a series of two vaccines for the best pneumonia protection.   -All adults should have a flu vaccine yearly and a tetanus vaccine every 10 years.   -All adults age 48 and older should receive the shingles vaccines (series of two vaccines). -All adults age 38-68 who are overweight should have a diabetes screening test once every three years.   -All adults born between [birth year] and 1965 should be screened once for Hepatitis C.  -Other screening tests and preventive services for persons with diabetes include: an eye exam to screen for diabetic retinopathy, a kidney function test, a foot exam, and stricter control over your cholesterol.   -Cardiovascular screening for adults with routine risk involves an electrocardiogram (ECG) at intervals determined by your doctor.   -Colorectal cancer screenings should be done for adults age 54-65 with no increased risk factors for colorectal cancer.   There are a number of acceptable methods of screening for this type of cancer. Each test has its own benefits and drawbacks. Discuss with your doctor what is most appropriate for you during your annual wellness visit. The different tests include: colonoscopy (considered the best screening method), a fecal occult blood test, a fecal DNA test, and sigmoidoscopy.    -A bone mass density test is recommended when a woman turns 65 to screen for osteoporosis. This test is only recommended one time, as a screening. Some providers will use this same test as a disease monitoring tool if you already have osteoporosis. -Breast cancer screenings are recommended every other year for women of normal risk, age 54-69.  -Cervical cancer screenings for women over age 72 are only recommended with certain risk factors.      Here is a list of your current Health Maintenance items (your personalized list of preventive services) with a due date:  Health Maintenance Due   Topic Date Due    Glaucoma Screening   11/28/2018

## 2020-09-30 NOTE — ACP (ADVANCE CARE PLANNING)
Advance Care Planning       Advance Care Planning (ACP) Physician/NP/PA (Provider) Conversation        Date of ACP Conversation: 9/30/2020    Conversation Conducted with:   Patient with Decision Making Sumeet Powell Maker:    Current Designated Health Care Decision Maker:   (If there is a valid [address] named in the [address]" box in the ACP activity, but it is not visible above, be sure to open that field and then select the health care decision maker relationship (ie \"primary\") in the blank space to the right of the name.)    Note: Assess and validate information in current ACP documents, as indicated. If no Authorized Decision Maker has previously been identified, then patient chooses [address]:  \"Who would you like to name as your primary health care decision-maker? \"    Name: Tylor Camacho   Relationship: daughter Phone number:   Ian Schmidt this person be reached easily? \" YES  \"Who would you like to name as your back-up decision maker? \"   Name:   Relationship:  Phone number:   \"Can this person be reached easily? \" NO    Note: If the relationship of these Decision-Makers to the patient does NOT follow your state's Next of Kin hierarchy, recommend that patient complete ACP document that meets state-specific requirements to allow them to act on the patient's behalf when appropriate. Care Preferences:    Hospitalization: \"If your health worsens and it becomes clear that your chance of recovery is unlikely, what would your preference be regarding hospitalization? \"  If the patient would want hospitalization, answer \"yes\". If the patient would prefer comfort-focused treatment without hospitalization, answer \"no\". yes      Ventilation:   \"If you were in your present state of health and suddenly became very ill and were unable to breathe on your own, what would your preference be about the use of a ventilator (breathing machine) if it was available to you?\"    If patient would desire the use of a ventilator (breathing machine), answer \"yes\", if not answer \"no\":yes    \"If your health worsens and it becomes clear that your chance of recovery is unlikely, what would your preference be about the use of a ventilator (breathing machine) if it was available to you? \"   yes      Resuscitation:  \"CPR works best to restart the heart when there is a sudden event, like a heart attack, in someone who is otherwise healthy. Unfortunately, CPR does not typically restart the heart for people who have serious health conditions or who are very sick. \"    \"In the event your heart stopped as a result of an underlying serious health condition, would you want attempts to be made to restart your heart (answer \"yes\" for attempt to resuscitate) or would you prefer a natural death (answer \"no\" for do not attempt to resuscitate)? \"   yes    NOTE: If the patient has a valid advance directive AND provides care preference(s) that are inconsistent with that prior directive, advise the patient to consider either: creating a new advance directive that complies with state-specific requirements; or, if that is not possible, orally revoking that prior directive in accordance with state-specific requirements, which must be documented in the EHR.     Conversation Outcomes / Follow-Up Plan:   Recommended completion of Advance Directive      Length of Voluntary ACP Conversation in minutes:  16 minutes      Ana Stearns MD

## 2020-11-10 NOTE — PROGRESS NOTES
Breast Cancer     Ms. 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 She was diagnosed on core biopsy 3/13/20. Due to the [place] pandemic, based on recommendations issued from medical and government sources, she was started on upfront aromatase inhibitor with delay of surgery until resources were more available and non-emergent surgery permitted. She was started on letrozole per Dr. Jarrod Gamez. The area of concern was identified on screening imaging. She denied palpable mass. She denied nipple discharge. She denied breast pain. There family history of breast cancer. A sister had pancreatic cancer. Her most recent previous mammogram was November 2015. She already followed with Dr. Jarrod Gamez for her monoclonal gammopathy. Genetic testing did not show any clinically significant mutation. She has an APC variant of uncertain significance. Posterior margin is close, but is chest wall. She completed radiation per Dr. Angeline Marinelli. Breast/GYN history:    OB History    No obstetric history on file.           Past Medical History:   Diagnosis Date    Allergic rhinitis     Breast cancer ([facility] [address] 03/2020    Dr Wisam Gregory; ER/MN+, HER-2 neg infiltrating ductal ca 2 sites LEFT breast; Dr Rogers Neighbor adenoma     1/15 Dr Adrianna Donis; polyp 2006 Dr Diane Silveira COPD     on films    DJD (degenerative joint disease)     Dyslipidemia     FHx: heart disease     GERD (gastroesophageal reflux disease)     s/p dilation Dr. Nette Herrera 2006; [place] and esophagitis 1/15 Dr Adrianna Donis    Monoclonal gammopathy 2011 Dr. Marte Flank Osteoporosis Dr. Adeel Bhakta, t score -0.6 spine, -2.7 hip     took forteo 2012 to 2014 Dr Herring    [facility] (basal cell carcinoma)     Dr. Andrews Jenelle dependence syndrome        Past Surgical History:   Procedure Laterality Date   [address] neg RIGHT breast biopsy Dr. Hal Aschoff HX BREAST LUMPECTOMY Left 6/30/2020    Left Sentinal Node Biopsy performed by Katy Dietz MD at [facility]      Dr. Tamiko Tafoya 2006 polyp; Dr Teofilo Wang 1/15 adenoma    HX HYSTERECTOMY  1970s    for precancerous lesions in the cervix    HX MASTECTOMY Left 6/30/2020    Left Partial Mastectomy with two site localization performed by Katy Dietz MD at [facility] MAIN OR    HX ORTHOPAEDIC      DEXA t score 0.32 spine, -2.28 hip FRAX 5.3 hip Dr Jeremiah Valencia (12/13); spine 0.68, hip -2.51 w FRAX 7.2 (12/15)    HX ORTHOPAEDIC  05/2017    RIGHT CTS release and thumb surgery Dr Dc Santizo      s/p c spine surgery [facility] 2010?  NEUROLOGICAL PROCEDURE UNLISTED      MMSE 30/30 (12/17); MMSE 20/30 (9/20)    DC BIOPSY OF BREAST, NEEDLE CORE Left 03/2020    seeing Dr Oumar Degroot       Current Outpatient Medications on File Prior to Visit   Medication Sig Dispense Refill    tiotropium (Spiriva with HandiHaler) 18 mcg inhalation capsule Take 1 Cap by inhalation daily. 90 Cap 3    CALCIUM CARBONATE (CALCIUM 500 PO) Take  by mouth daily.  albuterol (PROVENTIL HFA, VENTOLIN HFA, PROAIR HFA) 90 mcg/actuation inhaler Take 1 Puff by inhalation every six (6) hours as needed for Wheezing. 1 Inhaler 5    fluticasone (FLONASE) 50 mcg/actuation nasal spray 2 Sprays by Both Nostrils route daily. (Patient taking differently: 2 Sprays by Both Nostrils route as needed.) 3 Bottle 3    Cholecalciferol, Vitamin D3, (VITAMIN D3) 1,000 unit Cap Take 1,000 Units by mouth daily.  aspirin delayed-release 81 mg tablet Take 81 mg by mouth daily.  letrozole (FEMARA) 2.5 mg tablet Take 2.5 mg by mouth daily.  amLODIPine (NORVASC) 5 mg tablet Take 1 Tab by mouth daily. 90 Tab 3    atorvastatin (LIPITOR) 80 mg tablet Take 1 Tab by mouth daily. 90 Tab 3    ezetimibe (Zetia) 10 mg tablet Take 1 Tab by mouth daily.  90 Tab 3     No current facility-administered medications on file prior to visit.         Allergies   Allergen Reactions    Penicillins Rash and Hives       Social History     Tobacco Use    Smoking status: Current Every Day Smoker     Packs/day: 1.00     Years: 50.00     Pack years: 50.00     Types: Cigarettes    Smokeless tobacco: Never Used   Substance Use Topics    Alcohol use: No    Drug use: No       Family History   Problem Relation Age of Onset    Heart Attack Father     Kidney Disease Mother         ADPKD    Diabetes Sister     Cancer Sister         leukemia         ROS: positives are bolded  General: fevers, chills, night sweats, fatigue, weight loss, weight gain  GI: nausea, vomiting, abdominal pain, change in bowel habits, hematochezia, melena, GERD  Integ: dermatitis, abnormal moles  HEENT: visual changes, vertigo, epistaxis, dysphagia, hoarseness  Cardiac: chest pain, palpitations, HTN, edema, varicosities  Resp: cough, shortness of breath, wheezing, hemoptysis, snoring, reactive airway disease  : hematuria, dysuria, frequency, urgency, nocturia, stress urinary incontinence   MSK: weakness, joint pain, arthritis  Endocrine:  thyroid disease, polyuria, polydipsia, polyphagia, poor wound healing, heat intolerance, cold intolerance  Lymph/Heme: anemia, bruising, history of blood transfusions  Neuro: dizziness, headache, fainting, seizures, stroke  Psych: anxiety, depression    Physical Exam:  Visit Vitals  BP (!) 142/80   Pulse 82   Temp 98.8 °F (37.1 °C)   Resp 20   Ht 5' 2\" (1.575 m)   Wt 56.7 kg (125 lb)   SpO2 99%   BMI 22.86 kg/m²       Gen:  No distress  Head: normocephalic, atraumatic  Mouth: Clear, no overt lesions, oral mucosa pink and moist  Neck: supple, no masses, no adenopathy, trachea midline  Resp: clear bilaterally  Cardio: Regular rate and rhythm  Abdomen: soft, nontender, nondistended  Extremeties: warm, well-perfused  Neuro: sensation and strength grossly intact and symmetrical  Psych: alert and oriented to person, place and time  Breasts:   Right: Examined in both the supine and upright positions. There was no supraclavicular, infraclavicular, or axillary lympadenopathy. There were no dominant masses, no skin changes, no asymmetry identified   Left: Examined in both the supine and upright positions. There was no supraclavicular, infraclavicular, or axillary lympadenopathy. There were no skin changes  Incision clean, healing well, resolving acute radiation changes      Imaging:  3/10/20 left mammo/ultrasound ([facility])  Irregular hypoechoic masses at the left breast the 11:30 position, 3 cm from the  nipple and 12:30 position, 3 cm from nipple are highly suspicious. Recommend  ultrasound-guided biopsy of both masses.     Findings were discussed with the patient and her daughter at the time of the  exam.      BIRADS 5:  Highly suggestive of malignancy:  appropriate action should be taken     Breast Density B: There are scattered areas of fibroglandular density. 3/4/20 bilateral mammo ([facility])  2 spiculated masses in the left breast including upper inner quadrant and upper  outer quadrant. 2-D 3-D CC and MLO spot compression and mediolateral views  recommended along with ultrasound. Ultrasound of the left axilla recommended.     BI-RADS Assessment Category 0: Incomplete. Need additional imaging evaluation.      Density B: There are scattered areas of fibroglandular density. Pathology:  6/30/20     A: BREAST, LEFT, PARTIAL MASTECTOMY:   INFILTRATING DUCTAL CARCINOMAS. SIZES: MASS #1 - 1.0 CM; MASS #2 - 1.0 CM. HISTOLOGIC GRADE: MASS #1 - II OF III; MASS #2 - I OF III. TUBULAR DIFFERENTIATION, NUCLEAR PLEOMORPHISM AND MITOTIC ACTIVITY SCORES: MASS #1 - T3 N2 M1; MASS #2 - T2 N2 M1. IN-SITU COMPONENTS: HIGH AND INTERMEDIATE GRADE DUCTAL   CARCINOMAS IN-SITU (SEE MICROSCOPIC DESCRIPTION). MARGINS OF RESECTION: NOT INVOLVED.    DISTANCE FROM THE CLOSEST MARGINS: THE INVASIVE COMPONENT IS   LOCATED 0.25 CM FROM THE ANTERIOR MARGIN AND THE IN-SITU   COMPONENT IS LOCATED 0.125 CM FROM THE POSTERIOR MARGIN. TREATMENT EFFECT: NOT KNOWN.   ESTROGEN RECEPTOR: POSITIVE ([id number]). PROGESTERONE RECEPTOR: POSITIVE ([id number]). HER-2/JARRED (IHC): NEGATIVE ([id number]). AJCC STAGE: pT1b N0   B: BREAST, LEFT, NEW INFERIOR MARGIN, PARTIAL MASTECTOMY:   CAUTERIZED TISSUE WITH NO RESIDUAL IN-SITU OR INVASIVE CARCINOMA. MARGINS OF RESECTION WITH NO SIGNIFICANT HISTOPATHOLOGIC ABNORMALITIES. C: LYMPH NODES, LEFT AXILLA, SENTINEL, BIOPSY:   TWO LYMPH NODES WITH NO METASTATIC CARCINOMA (0/2). 3/13/20   A: LEFT BREAST, 11:30, CORE NEEDLE BIOPSY-   INFILTRATING DUCTAL ADENOCARCINOMA, NUCLEAR GRADE 2. Estrogen Receptor (ER): Positive (>90% of cells stain with strong intensity). Progesterone Receptor (PgR): Positive (>90 % of cells stain with moderate intensity). Her-2 (IHC Method): Negative (score 1+)   Percentage of cells with intense complete membrane stainin%    B: LEFT BREAST, 12:30, CORE NEEDLE BIOPSY-   INFILTRATING DUCTAL ADENOCARCINOMA, NUCLEAR GRADE 2. Estrogen Receptor (ER): Positive (>90% of cells stain with strong intensity). Progesterone Receptor (PgR): Positive (>90 % of cells stain with strong intensity). Her-2 (IHC Method): Negative (score 1+)   Percentage of cells with intense complete membrane stainin% .      Impression:  Patient Active Problem List   Diagnosis Code    Colon polyps Dr. Gary Tyler 2006 K63.5    Osteoporosis Dr. David Nielson M81.0    Monoclonal gammopathy  Dr. Mart[facility] D47.2    COPD on films J44.9    Dyslipidemia E78.5    Arthritis, degenerative M19.90    Gastroesophageal reflux disease without esophagitis K21.9    Advance directive discussed with patient Z71.89    Tobacco dependence syndrome F17.200    Primary hypertension I10    Cancer of overlapping sites of left female breast [facility]) C49.80        79year old woman with left T1bN0 ER/[place] positive, HER negative invasive ductal carcinoma, s/p left partial mastectomy, sentinel node biopsy 6/30/20. Continue letrozole per Dr. Aman Tapia. Follow up with me 3 months. Tobacco cessation again recommended. All questions were answered. She was asked to call with any additional questions or concerns.

## 2020-11-11 ENCOUNTER — OFFICE VISIT (OUTPATIENT)
Dept: SURGERY | Age: 80
End: 2020-11-11
Payer: MEDICARE

## 2020-11-11 VITALS
HEIGHT: 62 IN | WEIGHT: 125 LBS | HEART RATE: 82 BPM | DIASTOLIC BLOOD PRESSURE: 80 MMHG | OXYGEN SATURATION: 99 % | TEMPERATURE: 98.8 F | RESPIRATION RATE: 20 BRPM | BODY MASS INDEX: 23 KG/M2 | SYSTOLIC BLOOD PRESSURE: 142 MMHG

## 2020-11-11 DIAGNOSIS — Z17.0 MALIGNANT NEOPLASM OF OVERLAPPING SITES OF LEFT BREAST IN FEMALE, ESTROGEN RECEPTOR POSITIVE (HCC): Primary | ICD-10-CM

## 2020-11-11 DIAGNOSIS — C50.812 MALIGNANT NEOPLASM OF OVERLAPPING SITES OF LEFT BREAST IN FEMALE, ESTROGEN RECEPTOR POSITIVE (HCC): Primary | ICD-10-CM

## 2020-11-11 PROCEDURE — 99213 OFFICE O/P EST LOW 20 MIN: CPT | Performed by: SURGERY

## 2020-11-11 NOTE — LETTER
11/11/2020 9:37 AM 
 
Patient:  Veronika Welch YOB: 1940 Date of Visit: 11/11/2020 Josiane Laws MD 
[place] [address] VIA In Basket Dear Josiane Laws MD, 
 
 
I had the pleasure of seeing Ms. Johnie Huizar in my office today for her breast cancer. I am including a copy of my office visit today. If you have questions, please do not hesitate to call me. I look forward to following Ms. Orlando Lorenzo along with you and will keep you updated as to her progress. Sincerely, Alvarado Wiseman MD

## 2021-02-08 PROBLEM — I21.19 ACUTE INFERIOR MYOCARDIAL INFARCTION (HCC): Status: ACTIVE | Noted: 2021-02-08

## 2021-02-15 ENCOUNTER — VIRTUAL VISIT (OUTPATIENT)
Dept: INTERNAL MEDICINE CLINIC | Age: 81
End: 2021-02-15
Payer: MEDICARE

## 2021-02-15 DIAGNOSIS — I21.19 ACUTE INFERIOR MYOCARDIAL INFARCTION (HCC): Primary | ICD-10-CM

## 2021-02-15 DIAGNOSIS — I25.10 CORONARY ARTERY DISEASE DUE TO CALCIFIED CORONARY LESION: ICD-10-CM

## 2021-02-15 DIAGNOSIS — E78.5 DYSLIPIDEMIA: ICD-10-CM

## 2021-02-15 DIAGNOSIS — I10 PRIMARY HYPERTENSION: ICD-10-CM

## 2021-02-15 DIAGNOSIS — I25.84 CORONARY ARTERY DISEASE DUE TO CALCIFIED CORONARY LESION: ICD-10-CM

## 2021-02-15 PROCEDURE — 99496 TRANSJ CARE MGMT HIGH F2F 7D: CPT | Performed by: INTERNAL MEDICINE

## 2021-02-15 PROCEDURE — G8427 DOCREV CUR MEDS BY ELIG CLIN: HCPCS | Performed by: INTERNAL MEDICINE

## 2021-02-15 NOTE — PROGRESS NOTES
Hemanth Sutherland is a [de-identified] y.o. female who was seen by synchronous (real-time) audio-video technology on 2/15/2021 for Heart Problem        Assessment & Plan:   Diagnoses and all orders for this visit:    1. Acute inferior myocardial infarction ([address]    2. Coronary artery disease due to calcified coronary lesion    3. Dyslipidemia  -     LIPID PANEL; Future  -     METABOLIC PANEL, COMPREHENSIVE; Future    4. Primary hypertension        I spent at least 21 minutes on this visit with this established patient. 712  Subjective:     Objective:     Patient-Reported Vitals 2/14/2021   Patient-Reported Weight 120      General: alert, cooperative, no distress   Mental  status: normal mood, behavior, speech, dress, motor activity, and thought processes, able to follow commands   HENT: NCAT   Neck: no visualized mass   Resp: no respiratory distress   Neuro: no gross deficits   Skin: no discoloration or lesions of concern on visible areas   Psychiatric: normal affect, consistent with stated mood, no evidence of hallucinations     Additional exam findings: We discussed the expected course, resolution and complications of the diagnosis(es) in detail. Medication risks, benefits, costs, interactions, and alternatives were discussed as indicated. I advised her to contact the office if her condition worsens, changes or fails to improve as anticipated. She expressed understanding with the diagnosis(es) and plan. Hemanth Sutherland, who was evaluated through a patient-initiated, synchronous (real-time) audio-video encounter, and/or her healthcare decision maker, is aware that it is a billable service, with coverage as determined by her insurance carrier. She provided verbal consent to proceed: Yes, and patient identification was verified.  It was conducted pursuant to the emergency declaration under the [address] [facility], [address]5 waiver authority and the [place] [employer] Supplemental Appropriations Act. A caregiver was present when appropriate. Ability to conduct physical exam was limited. I was in the office. The patient was at home. Delilah Rome MD    Patient being seen today for transitional care management    Patient was admitted to [facility] from 2/8-11/21             I thoroughly reviewed the discharge summary, notes, consults, labs and imaging studies in the electronic record. Pertinent details are summarized below and the record has been updated to reflect recent events    She had presented with chest pain shortness of breath and was diagnosed with inferior posterior MI on EKG. She was taken to Cath Lab and had PTCA and stenting x3 of culprit RCA lesion by Dr. Pavithra Chan. She was started on metoprolol, aspirin, Brilinta. Echo as above. She is supposed to follow-up with Dr. Nneka Torres in early March. No chest pain since the hospitalization and she feels okay. Trying to cut down on the smoking with event above, not wanting anything for smoking cessation. Using prn alb but fairly consistent w spiriva    Denies any GI or  complaints     She continues to f/u with Dr Hilda Dewitt and Dr Kennedy Mendes and now on femara.  She is not on prolia currently    LAST MEDICARE WELLNESS EXAM: 4/30/14, 5/1/15, 5/19/16, 5/16/17, 9/26/19, 9/30/20    Past Medical History:   Diagnosis Date    Allergic rhinitis     Breast cancer ([facility] 03/2020    Dr Hilda Dewitt; ER/IL+, HER-2 neg infiltrating ductal ca 2 sites LEFT breast; Dr Maureen Shea adenoma     1/15 Dr Ameena Sprague; polyp 2006 Dr Myers Serum COPD     on films    DJD (degenerative joint disease)     Dyslipidemia     FHx: heart disease     GERD (gastroesophageal reflux disease)     s/p dilation Dr. Maribel Paul 2006; [facility] and esophagitis 1/15 Dr Jeannette Mcmahan H/O cardiac catheterization 02/2021    Dr Pavithra Chan [facility]; LAD mild irreg, L Cx clean, RCA heavy calc throughout, occluded midsegment, s/p cutting balloon and angioscult, STEPHANE x3    H/O echocardiogram 02/2021    [facility]; ef 56%, gr 2dd, mild MR    Monoclonal gammopathy 2011 Dr. Bi Orellana Osteoporosis Dr. Kala Van, t score -0.6 spine, -2.7 hip     took forteo 2012 to 2014 Dr Herring    Recurrent BCC (basal cell carcinoma)     Dr. Cherise Hoskins STEMI (ST elevation myocardial infarction) [facility]) 02/2021    [facility]; inf/post MI; cath Dr Riri Smith showed cutting balloon angioplasty, angiosculpt, STEPHANE x3 heavily calc RCA    Tobacco dependence syndrome      Past Surgical History:   Procedure Laterality Date    HX BREAST LUMPECTOMY Left 6/30/2020    Left Sentinal Node Biopsy performed by Romana Neptune, MD at [facility]      Dr. Juan Kelly 2006 polyp; Dr Mo Ruano 1/15 adenoma    HX HYSTERECTOMY  1970s    for precancerous lesions in the cervix    HX MASTECTOMY Left 6/30/2020    Left Partial Mastectomy with two site localization performed by Romana Neptune, MD at [facility] MAIN OR    HX ORTHOPAEDIC      DEXA t score 0.32 spine, -2.28 hip FRAX 5.3 hip Dr Kala Van (12/13); spine 0.68, hip -2.51 w FRAX 7.2 (12/15)    HX ORTHOPAEDIC  05/2017    RIGHT CTS release and thumb surgery Dr Yojana Kraus      s/p c spine surgery [facility] 2010?  NEUROLOGICAL PROCEDURE UNLISTED      MMSE 30/30 (12/17);   MMSE 20/30 (9/20)    [place] BIOPSY OF BREAST, NEEDLE CORE Left 03/2020    seeing Dr Markus Collier    neg RIGHT breast biopsy Dr. Lauren Laureano Marital status:      Spouse name: Not on file    Number of children: 3    Years of education: Not on file    Highest education level: Not on file   Occupational History    Occupation: retired [employer]   Social Needs    Financial resource strain: Not on file    Food insecurity     Worry: Not on file     Inability: Not on file   [employer] needs     Medical: Not on file     Non-medical: Not on file   Tobacco Use    Smoking status: Current Every Day Smoker Packs/day: 1.00     Years: 50.00     Pack years: 50.00     Types: Cigarettes    Smokeless tobacco: Never Used   Substance and Sexual Activity    Alcohol use: No    Drug use: No    Sexual activity: Not on file   Lifestyle    Physical activity     Days per week: Not on file     Minutes per session: Not on file    Stress: Not on file   Relationships    Social connections     Talks on phone: Not on file     Gets together: Not on file     Attends Bahai service: Not on file     Active member of club or organization: Not on file     Attends meetings of clubs or organizations: Not on file     Relationship status: Not on file    Intimate partner violence     Fear of current or ex partner: Not on file     Emotionally abused: Not on file     Physically abused: Not on file     Forced sexual activity: Not on file   Other Topics Concern    Not on file   Social History Narrative    Not on file     Allergies   Allergen Reactions    Penicillins Rash and Hives       Current Outpatient Medications   Medication Sig    ticagrelor (BRILINTA) 90 mg tablet Take 1 Tab by mouth two (2) times a day.  aspirin 81 mg chewable tablet Take 1 Tab by mouth daily.  metoprolol tartrate (LOPRESSOR) 25 mg tablet Take 1 Tab by mouth two (2) times a day.  letrozole (FEMARA) 2.5 mg tablet Take 2.5 mg by mouth daily.  amLODIPine (NORVASC) 5 mg tablet Take 1 Tab by mouth daily.  atorvastatin (LIPITOR) 80 mg tablet Take 1 Tab by mouth daily.  ezetimibe (Zetia) 10 mg tablet Take 1 Tab by mouth daily.  tiotropium (Spiriva with HandiHaler) 18 mcg inhalation capsule Take 1 Cap by inhalation daily.  CALCIUM CARBONATE (CALCIUM 500 PO) Take  by mouth daily.  albuterol (PROVENTIL HFA, VENTOLIN HFA, PROAIR HFA) 90 mcg/actuation inhaler Take 1 Puff by inhalation every six (6) hours as needed for Wheezing.  fluticasone (FLONASE) 50 mcg/actuation nasal spray 2 Sprays by Both Nostrils route daily.  (Patient taking differently: 2 Sprays by Both Nostrils route as needed.)     No current facility-administered medications for this visit.       REVIEW OF SYSTEMS:  S/p hyst, mammo 3/20, DEXA 12/15 Dr Sintia Villegas, colo 1/15 Dr Prasanna Del Castillo, sees Dr Ravindra Toro and Dr Nikko Lord  no vision change or eye pain  Oral  no mouth pain, tongue or tooth problems  Ears  no hearing loss, ear pain, fullness, no swallowing problems  Chest  no breast masses  Resp  no wheezing, chronic coughing, dyspnea  GI  no heartburn, nausea, vomiting, change in bowel habits, bleeding, hemorrhoids  Urinary  no dysuria, hematuria, flank pain, urgency, frequency    LABS  From 5/11 showed   gluc 90,   cr 0.70,    alt 21,          chol 149, tg 100, hdl 29, ldl-c 100, wbc 9.8, hb 14.2, plt 276  From 10/11 showed gluc 100, cr 1.04, gfr 55  From 11/11 showed                   ldl-p 1685, chol 157, tg 116, hdl 41, ldl-c 93,   spep+  From 3/12 showed   gluc 91,   cr 0.84, gfr 70,  alt 17, ldl-p 1309, chol 126, tg 86,   hdl 37, ldl-c 72  From 8/12 showed   gluc 95,   cr 0.86, gfr 68,  alt 21  From 3/13 showed                   ldl-p 1281, chol 118, tg 93,   hdl 40, ldl-c 59  From 9/13 showed   gluc 92,   cr 0.82, gfr 72,  alt 19, ldl-p 1268, chol 136, tg 71,   hdl 42, ldl-c 80,   wbc 7.7, hb 13.4, plt 310, ua neg pro  From 3/14 showed   gluc 92,   cr 0.88, gfr>60, alt<5,          chol 122, tg 104, hdl 35, ldl-c 66,   wbc 7.5, hb 12.7, plt 312, ua neg  From 10/14 showed               chol 140, tg 100, hdl 36, ldl-c 84  From 4/15 showed   gluc 94,   cr 0.96, gfr>60, alt 6,          wbc 9.3, hb 14.0, plt 313  From 11/15 showed               chol 144, tg 79,   hdl 44, ldl-c 84  From 5/16 showed   gluc 82,   cr 0.82, gfr>60, alt 26,          wbc 5.3, hb 14.6, plt 241  From 11/16 showed               chol 217, tg 165, hdl 47, ldl-c 137  From 5/17 showed   gluc 90,   cr 0.75, gfr>60, alt 29,          wbc 7.4, hb 14.7, plt 224  From 6/17 showed   gluc 86,   cr 0.86, gfr>60, wbc 8.4, hb 15.4, plt 235  From 9/17 showed   gluc 80,   cr 0.81, gfr 71,  alt 18,          wbc 8.4, hb 15.1, plt 267, fe 113, %sat 37, b12 959, FERNY+ igg mono lambda light chain, vit d 41.5  From 12/17 showed               chol 109, tg 73,   hdl 49, ldl-c 45  From 8/18 showed   gluc 89,   cr 0.78, gfr>60,           chol 120, tg 80,   hdl 48, ldl-c 56  From 4/19 showed                  wbc 8.8, hb 14.5, plt 259  From 9/19 showed   gluc 85,   cr 0.81, gfr>60, alt 23,          chol 136, tg 92,   hdl 46, ldl-c 71  From 3/20 showed               wbc 7.6, hb 14.8, plt 266  From 6/20 showed   gluc 90,   cr 0.92, gfr 59,  alt 24,          chol 164, tg 91,   hdl 48, ldl-c 98  From 9/20 showed   gluc 89,   cr 0.94, gfr 57,  alt 31,          wbc 7.0, hb 14.4, plt 240, tsh 1.82, ft4 1.10, b12 297, fol>20  From 2/21 showed   gluc 95,   cr 0.90, gfr>60,           chol 108, tg 44,   hdl 61, ldl-c 52,   wb 11.1, hb 12.3, plt 236, trop 68    We reviewed the patient's labs from the last several visits to point out trends in the numbers          Patient Active Problem List   Diagnosis Code    Colon polyps Dr. Garry Do 2006 K63.5    Osteoporosis Dr. Doni Matthews M81.0    Monoclonal gammopathy 2011 Dr. Yue Mitchell D47.2    COPD on films J44.9    Dyslipidemia E78.5    Arthritis, degenerative M19.90    Gastroesophageal reflux disease without esophagitis K21.9    Advance directive discussed with patient Z71.89    Tobacco dependence syndrome F17.200    Primary hypertension I10    Cancer of overlapping sites of left female breast ([facility] [address] C50.812    Acute inferior myocardial infarction ([facility] [address] I21.19    CHD (coronary heart disease) I25.10     ASSESSMENT AND PLAN:  1. Dyslipidemia. Continue current regimen  2. GERD. Daily PPI and avoidance measures  3. DJD. Continue prn otc meds  4. Colon polyps. Fiber  5. COPD. Continue current. 6.  Osteoporosis. F/U Dr. Doni Matthews as sched  7. MGUS. F/U Dr. Destin Bauer  8. Allergies.   F/U  Gary, flonase cont  9. Smoking cessation again reiterated  10. HTN. Continue current regimen. 11.  Breast ca. Per Dr Yarelis White and Dr Jennifer Kelly  12. CHD. Continue current regimen and f/u Dr Kyle Gamboa as scheduled        RTC 3/21    Above conditions discussed at length and patient vocalized understanding.   All questions answered to patient satisfaction

## 2021-03-02 ENCOUNTER — TRANSCRIBE ORDER (OUTPATIENT)
Dept: SCHEDULING | Age: 81
End: 2021-03-02

## 2021-03-02 ENCOUNTER — OFFICE VISIT (OUTPATIENT)
Dept: NEUROLOGY | Age: 81
End: 2021-03-02
Payer: MEDICARE

## 2021-03-02 VITALS
SYSTOLIC BLOOD PRESSURE: 110 MMHG | DIASTOLIC BLOOD PRESSURE: 60 MMHG | HEART RATE: 67 BPM | HEIGHT: 60 IN | RESPIRATION RATE: 18 BRPM | TEMPERATURE: 97.7 F | WEIGHT: 121 LBS | OXYGEN SATURATION: 98 % | BODY MASS INDEX: 23.75 KG/M2

## 2021-03-02 DIAGNOSIS — R41.3 MEMORY LOSS: ICD-10-CM

## 2021-03-02 DIAGNOSIS — R41.3 MEMORY LOSS: Primary | ICD-10-CM

## 2021-03-02 DIAGNOSIS — Z12.31 VISIT FOR SCREENING MAMMOGRAM: Primary | ICD-10-CM

## 2021-03-02 PROCEDURE — G0463 HOSPITAL OUTPT CLINIC VISIT: HCPCS | Performed by: NURSE PRACTITIONER

## 2021-03-02 PROCEDURE — 99204 OFFICE O/P NEW MOD 45 MIN: CPT | Performed by: NURSE PRACTITIONER

## 2021-03-02 NOTE — PROGRESS NOTES
Diane Camacho presents today for   Chief Complaint   Patient presents with    Memory Loss    New Patient       Is someone accompanying this pt? Yes, Daughter    Is the patient using any DME equipment during [address]? no    Depression Screening:  3 most recent PHQ Screens 9/30/2020   Little interest or pleasure in doing things Not at all   Feeling down, depressed, irritable, or hopeless Not at all   Total Score PHQ 2 0       Learning Assessment:  Learning Assessment 2/20/2019   PRIMARY LEARNER Patient   HIGHEST LEVEL OF EDUCATION - PRIMARY LEARNER  DID NOT GRADUATE [facility] PRIMARY LEARNER NONE   CO-LEARNER CAREGIVER No   PRIMARY LANGUAGE ENGLISH   LEARNER PREFERENCE PRIMARY DEMONSTRATION     -     -   ANSWERED BY patient   RELATIONSHIP SELF       Abuse Screening:  Abuse Screening Questionnaire 3/31/2020   Do you ever feel afraid of your partner? N   Are you in a relationship with someone who physically or mentally threatens you? N   Is it safe for you to go home? Y       Fall Risk  Fall Risk Assessment, last 12 mths 3/2/2021   Able to walk? Yes   Fall in past 12 months? 0   Do you feel unsteady? 0   Are you worried about falling 0         Coordination of Care:  1. Have you been to the ER, urgent care clinic since your last visit? Hospitalized since your last visit? Yes, [address]    2. Have you seen or consulted any other health care providers outside of the [address] [place] [address] since your last visit? Include any pap smears or colon screening.  wilman

## 2021-03-02 NOTE — PROGRESS NOTES
[facility]  [address] [facility], [address], [place], [address]  [address] Bijan Figueroa, [address]  Office:  [phone]  Fax: [phone]    Referring: Zeyad Gill MD    Chief Complaint   Patient presents with    Memory Loss    New Patient       HPI:  This is a [de-identified]year old female who presents with chief complaints of memory loss. She is accompanied in office today by her daughter Elis Man. Her daughter provides a majority of the history today. Onset of symptoms was two to three years ago. Isaura mentions she can repeating and asking the same questions. Living at home with her daughter. She has been home with her over the past year. 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 This week her daughter will be out of town for 4 days. Mom will be going to stay with her son and his family, as they live locally. Recently was admitted to [facility] for heart attack. Her daughter said she came home from work and mom was lying in bed. Said her chest hurt. Her daughter thought she had COVID. She called 911 and was admitted to the hospital for three days. She saw her primary care provider Dr. Fredo Soares in follow up. Denies concerns since discharge for chest pain, shortness or breath or palpitations. Denies history of weakness, numbness, or tingling. Denies history of lightheadedness or stroke like symptoms. Denies headaches. Her daughter denies concerns for falls or dangerous behaviors. Denies wandering, hallucinations, or aggression. Patient does not cook. Has not driven since being in the hospital. Daughter manages her finances and has for the past two years. Patient needs help with her medications. Able to perform activities of daily living including bathing, eating, and dressing with no concerns. Denies family history of memory loss. She is going to start cardiac rehab doing this over the phone per patient's daughter. This will start next week.  She denies recent head imaging. Breast CA last year. Left breast lumpectomy performed. Per daughter she is clear now. Radiation for this. Recently saw oncolongy. Social History     Socioeconomic History    Marital status:      Spouse name: Not on file    Number of children: 3    Years of education: Not on file    Highest education level: Not on file   Occupational History    Occupation: retired [employer]   Social Needs    Financial resource strain: Not on file    Food insecurity     Worry: Not on file     Inability: Not on file   [employer] needs     Medical: Not on file     Non-medical: Not on file   Tobacco Use    Smoking status: Current Every Day Smoker     Packs/day: 1.00     Years: 50.00     Pack years: 50.00     Types: Cigarettes    Smokeless tobacco: Never Used   Substance and Sexual Activity    Alcohol use: No    Drug use: No    Sexual activity: Not on file   Lifestyle    Physical activity     Days per week: Not on file     Minutes per session: Not on file    Stress: Not on file   Relationships    Social connections     Talks on phone: Not on file     Gets together: Not on file     Attends Pent[employer]ism service: Not on file     Active member of club or organization: Not on file     Attends meetings of clubs or organizations: Not on file     Relationship status: Not on file    Intimate partner violence     Fear of current or ex partner: Not on file     Emotionally abused: Not on file     Physically abused: Not on file     Forced sexual activity: Not on file   Other Topics Concern    Not on file   Social History Narrative    Not on file       Family History   Problem Relation Age of Onset    Heart Attack Father     Kidney Disease Mother         ADPKD    Diabetes Sister     Cancer Sister         leukemia       Current Outpatient Medications   Medication Sig Dispense Refill    ticagrelor (BRILINTA) 90 mg tablet Take 1 Tab by mouth two (2) times a day.  180 Tab 3    aspirin 81 mg chewable tablet Take 1 Tab by mouth daily. 180 Tab 1    metoprolol tartrate (LOPRESSOR) 25 mg tablet Take 1 Tab by mouth two (2) times a day. 180 Tab 3    amLODIPine (NORVASC) 5 mg tablet Take 1 Tab by mouth daily. 90 Tab 3    atorvastatin (LIPITOR) 80 mg tablet Take 1 Tab by mouth daily. 90 Tab 3    ezetimibe (Zetia) 10 mg tablet Take 1 Tab by mouth daily. 90 Tab 3    tiotropium (Spiriva with HandiHaler) 18 mcg inhalation capsule Take 1 Cap by inhalation daily. 90 Cap 3    CALCIUM CARBONATE (CALCIUM 500 PO) Take  by mouth daily.  letrozole (FEMARA) 2.5 mg tablet Take 2.5 mg by mouth daily.  albuterol (PROVENTIL HFA, VENTOLIN HFA, PROAIR HFA) 90 mcg/actuation inhaler Take 1 Puff by inhalation every six (6) hours as needed for Wheezing. 1 Inhaler 5    fluticasone (FLONASE) 50 mcg/actuation nasal spray 2 Sprays by Both Nostrils route daily.  (Patient taking differently: 2 Sprays by Both Nostrils route as needed.) 3 Bottle 3       Past Medical History:   Diagnosis Date    Allergic rhinitis     Breast cancer ([facility] [address] 03/2020    Dr María Elena Townsend; ER/VT+, HER-2 neg infiltrating ductal ca 2 sites LEFT breast; Dr Cisco Cline adenoma     1/15 Dr Miranda Titus; polyp 2006 Dr Felipe Leblanc COPD     on films    DJD (degenerative joint disease)     Dyslipidemia     FHx: heart disease     GERD (gastroesophageal reflux disease)     s/p dilation Dr. Jacinta Zee 2006; [place] and esophagitis 1/15 Dr Katja Llamas H/O cardiac catheterization 02/2021    Dr Reese Griffin [facility]; LAD mild irreg, L Cx clean, RCA heavy calc throughout, occluded midsegment, s/p cutting balloon and angioscult, STEPHANE x3    H/O echocardiogram 02/2021    [facility]; ef 56%, gr 2dd, mild MR    Monoclonal gammopathy 2011 Dr. Nato Ivan Osteoporosis Dr. Liliana Cruz, t score -0.6 spine, -2.7 hip     took forteo 2012 to 2014 Dr Herring    Recurrent BCC (basal cell carcinoma)     Dr. Sangeetha Whitaker STEMI (ST elevation myocardial infarction) [facility]) 02/2021    [facility]; inf/post MI; cath Dr Reese Griffin showed cutting balloon angioplasty, angiosculpt, STEPHANE x3 heavily calc RCA    Tobacco dependence syndrome        Past Surgical History:   Procedure Laterality Date    HX BREAST LUMPECTOMY Left 6/30/2020    Left Sentinal Node Biopsy [facility]      Dr. Quiana Viera 2006 polyp; Dr Su Miller 1/15 adenoma    HX HYSTERECTOMY  1970s    for precancerous lesions in the cervix    HX MASTECTOMY Left 6/30/2020    Left Partial Mastectomy with two site localization performed by Jacob [facility] MD at [facility] MAIN OR    HX ORTHOPAEDIC      DEXA t score 0.32 spine, -2.28 hip FRAX 5.3 hip Dr Augeda Garcia (12/13); spine 0.68, hip -2.51 w FRAX 7.2 (12/15)    HX ORTHOPAEDIC  05/2017    RIGHT CTS release and thumb surgery Dr Kacie Hogue      s/p c spine surgery [facility] 2010?  NEUROLOGICAL PROCEDURE UNLISTED      MMSE 30/30 (12/17);   MMSE 20/30 (9/20)    OR BIOPSY OF BREAST, NEEDLE CORE Left 03/2020    seeing Dr Sal ceron RIGHT breast biopsy Dr. Lex Barbosa       Allergies   Allergen Reactions    Penicillins Rash and Hives       Patient Active Problem List   Diagnosis Code    Colon polyps Dr. Quiana Viera 2006 K63.5    Osteoporosis Dr. Agueda Garcia M81.0    Monoclonal gammopathy 2011 Dr. Nba Avalos D47.2    COPD on films J44.9    Dyslipidemia E78.5    Arthritis, degenerative M19.90    Gastroesophageal reflux disease without esophagitis K21.9    Advance directive discussed with patient Z71.89    Tobacco dependence syndrome F17.200    Primary hypertension I10    Cancer of overlapping sites of left female breast ([address] C50.812    Acute inferior myocardial infarction ([address] I21.19    CHD (coronary heart disease) I25.10         Review of Systems:   Constitutional: no fever or chills  Skin denies rash or itching  HEENT:  Denies tinnitus, hearing loss, or visual changes  Respiratory: denies shortness of breath  Cardiovascular: denies chest pain, dyspnea on exertion  Gastrointestinal: does not report nausea or vomiting  Genitourinary: does not report dysuria or incontinence  Musculoskeletal: does not report joint pain or swelling  Endocrine: denies weight change  Hematology: denies easy bruising or bleeding   Neurological: as above in HPI      PHYSICAL EXAMINATION:      VITAL SIGNS:    Visit Vitals  /60 (BP 1 Location: Left upper arm, BP Patient Position: Sitting, BP Cuff Size: Adult)   Pulse 67   Temp 97.7 °F (36.5 °C)   Resp 18   Ht 5' (1.524 m)   Wt 54.9 kg (121 lb)   SpO2 98%   BMI 23.63 kg/m²   MEMORY QUESTIONS:    Today's date: year 2021, promting March, Holiday, unsure what Syble Brisa is in March, unsure what day of the week it is,  Unsure date  Location: State is [place], lives in [address] [place] [address], unsure what country we are in  Current president: Bhumika Lowry  Previous president: Saul  Recall: 0/3  Recognition: 3/3  3 step command: intact  How many quarters in $1.50: 6  WORLD: DROW unable to spell world backwards     GENERAL: Well developed, well nourished, in no apparent distress. HEART: RR, no murmurs heard, no carotid bruits  LUNGS:                      CTAB  EXTREMITIES: No clubbing, cyanosis, or edema is identified. Pulses 2+    and symmetrical.  HEAD:   Normocephalic, atraumatic. NEUROLOGIC EXAMINATION    MENTAL STATUS: Awake, alert, and oriented to person. Disoriented to time and location. Attention and STM are abnormal. There is no aphasia. Fund of knowledge is adequate. Mood and affect are appropriate  CRANIAL NERVES: Visual fields are full to confrontation. No fundus anomalies observed. Pupils are reactive to light and accommodation. Extraocular movements are intact and there is no nystagmus. Facial sensation is normal  Face is symmetrical.   Hearing is grossly intact. SCM/TPZ 5/5  Palate rises symmetrically. Tongue is in the midline.         MOTOR:   Normal tone, bulk, and strength, 5/5 muscle strength throughout. No cogwheel rigidity or clonus present. CEREBELLAR: Finger to nose was normal.   No tremors or dysmetria    SENSORY:  Normal PP, vibration, propioception. Romberg deferred. DTR's:   +2 throughout, toes downgoing     GAIT:   Normal gait      Impression/Plan  Diane Camacho is a [de-identified] y.o. female whose history and physical are consistent with memory loss. Here with daughter who reports concerns of memory loss over the past 3-4 years. Living with her daughter. Daughter travels for work. Has been home and working locally over the past year. We discussed safety concerns. Patient with deficits and short term memory and disoriented to place and time. Denies dangerous behaviors. Denies family hx of memory loss. Discussed workup including MRI of the brain and referral to Dr. Sara Murphy for formal memory testing. Serum labs ordered. She will have testing complete and follow up pending results determine appropriate plan of care. All questions addressed and patient and patient's daughter are agreeable with plan of care. Diagnoses and all orders for this visit:    1. Memory loss  -     MRI BRAIN WO CONT; Future  -     VITAMIN B12; Future  -     TSH AND FREE T4; Future  -     REFERRAL TO NEUROPSYCHOLOGY      I spent 45 minutes with the patient in face-to-face consultation, with 37 minutes spent in counseling and coordination of care as described above. This note will not be viewable in [address] Signed By: Boom Meza NP      PLEASE NOTE:   Portions of this document may have been produced using voice recognition software. Unrecognized errors in transcription may be present.

## 2021-03-08 ENCOUNTER — HOSPITAL ENCOUNTER (OUTPATIENT)
Dept: LAB | Age: 81
Discharge: HOME | End: 2021-03-08
Payer: MEDICARE

## 2021-03-08 ENCOUNTER — OFFICE VISIT (OUTPATIENT)
Dept: NEUROLOGY | Age: 81
End: 2021-03-08
Payer: MEDICARE

## 2021-03-08 DIAGNOSIS — F41.8 ANXIETY ABOUT HEALTH: ICD-10-CM

## 2021-03-08 DIAGNOSIS — E78.5 DYSLIPIDEMIA: ICD-10-CM

## 2021-03-08 DIAGNOSIS — J41.0 SIMPLE CHRONIC BRONCHITIS (HCC): ICD-10-CM

## 2021-03-08 DIAGNOSIS — R41.3 MEMORY LOSS: Primary | ICD-10-CM

## 2021-03-08 DIAGNOSIS — R41.3 MEMORY LOSS: ICD-10-CM

## 2021-03-08 LAB
ALBUMIN SERPL-MCNC: 3.9 G/DL (ref 3.4–5)
ALBUMIN/GLOB SERPL: 1.2 {RATIO} (ref 0.8–1.7)
ALP SERPL-CCNC: 97 U/L (ref 45–117)
ALT SERPL-CCNC: 34 U/L (ref 13–56)
ANION GAP SERPL CALC-SCNC: 6 MMOL/L (ref 3–18)
AST SERPL-CCNC: 22 U/L (ref 10–38)
BILIRUB SERPL-MCNC: 0.9 MG/DL (ref 0.2–1)
BUN SERPL-MCNC: 10 MG/DL (ref 7–18)
BUN/CREAT SERPL: 14 (ref 12–20)
CALCIUM SERPL-MCNC: 9.2 MG/DL (ref 8.5–10.1)
CHLORIDE SERPL-SCNC: 106 MMOL/L (ref 100–111)
CHOLEST SERPL-MCNC: 133 MG/DL
CO2 SERPL-SCNC: 28 MMOL/L (ref 21–32)
CREAT SERPL-MCNC: 0.71 MG/DL (ref 0.6–1.3)
GLOBULIN SER CALC-MCNC: 3.3 G/DL (ref 2–4)
GLUCOSE SERPL-MCNC: 93 MG/DL (ref 74–99)
HDLC SERPL-MCNC: 49 MG/DL (ref 40–60)
HDLC SERPL: 2.7 {RATIO} (ref 0–5)
LDLC SERPL CALC-MCNC: 66.6 MG/DL (ref 0–100)
LIPID PROFILE,FLP: NORMAL
POTASSIUM SERPL-SCNC: 4.2 MMOL/L (ref 3.5–5.5)
PROT SERPL-MCNC: 7.2 G/DL (ref 6.4–8.2)
SODIUM SERPL-SCNC: 140 MMOL/L (ref 136–145)
T4 FREE SERPL-MCNC: 1.2 NG/DL (ref 0.7–1.5)
TRIGL SERPL-MCNC: 87 MG/DL (ref ?–150)
TSH SERPL DL<=0.05 MIU/L-ACNC: 1.53 UIU/ML (ref 0.36–3.74)
VIT B12 SERPL-MCNC: 339 PG/ML (ref 211–911)
VLDLC SERPL CALC-MCNC: 17.4 MG/DL

## 2021-03-08 PROCEDURE — 90791 PSYCH DIAGNOSTIC EVALUATION: CPT | Performed by: PSYCHOLOGIST

## 2021-03-08 PROCEDURE — 84439 ASSAY OF FREE THYROXINE: CPT

## 2021-03-08 PROCEDURE — 36415 COLL VENOUS BLD VENIPUNCTURE: CPT

## 2021-03-08 PROCEDURE — 80061 LIPID PANEL: CPT

## 2021-03-08 PROCEDURE — 80053 COMPREHEN METABOLIC PANEL: CPT

## 2021-03-08 PROCEDURE — 82607 VITAMIN B-12: CPT

## 2021-03-08 RX ORDER — ALBUTEROL SULFATE 90 UG/1
1 AEROSOL, METERED RESPIRATORY (INHALATION)
Qty: 1 INHALER | Refills: 5 | Status: SHIPPED | OUTPATIENT
Start: 2021-03-08 | End: 2021-08-03 | Stop reason: SDUPTHER

## 2021-03-08 NOTE — PROGRESS NOTES
[address] [facility]  Neuroscience   [address] [facility] [place], [address]  Office:  [phone]  Fax: [phone]                  Initial Office Exam  Patient Name: Oneil Garrett  Age: [de-identified] y.o. Gender: female   Handedness: right handed   Presenting Concern: memory loss  Primary Care Physician: Gagan Boss MD  Referring Provider: Lissett Bonner NP      REASON FOR REFERRAL:  This comprehensive and medically necessary neuropsychological assessment was requested to assist a differential diagnosis of memory complaints. The use and purpose of this examination, as well as the extent and limitations of confidentiality, were explained prior to obtaining permission to participate. Instructions were provided regarding the necessity to put forth optimal effort and answer questions truthfully in order to obtain reliable and accurate test results. REVIEW OF RECORDS:  Ms. Juan Alberto Travis was referred by neurology for a work-up of memory loss. Neuropsychiatric features, wandering, and dangerous behaviors have been denied. Ms. Juan Alberto Travis' daughter has noted memory loss over the last 2 to 3 years. Ms. Juan Alberto Travis resides with her daughter and is dependent on her for medication management and bill payment. She remains independent for bathing, grooming, and dressing. Ms. Juan Alberto Travis was recently admitted to [facility] for a heart attack. Ms. Juan Alberto Travis is home alone during the day while her daughter works. Her daughter recently went out of town for 4 days and Ms. Juan Alberto Travis stayed with her son. An MRI has been ordered. Current Outpatient Medications   Medication Sig    ticagrelor (BRILINTA) 90 mg tablet Take 1 Tab by mouth two (2) times a day.  aspirin 81 mg chewable tablet Take 1 Tab by mouth daily.  metoprolol tartrate (LOPRESSOR) 25 mg tablet Take 1 Tab by mouth two (2) times a day.  letrozole (FEMARA) 2.5 mg tablet Take 2.5 mg by mouth daily.     amLODIPine (NORVASC) 5 mg tablet Take 1 Tab by mouth daily.  atorvastatin (LIPITOR) 80 mg tablet Take 1 Tab by mouth daily.  ezetimibe (Zetia) 10 mg tablet Take 1 Tab by mouth daily.  tiotropium (Spiriva with HandiHaler) 18 mcg inhalation capsule Take 1 Cap by inhalation daily.  CALCIUM CARBONATE (CALCIUM 500 PO) Take  by mouth daily.  albuterol (PROVENTIL HFA, VENTOLIN HFA, PROAIR HFA) 90 mcg/actuation inhaler Take 1 Puff by inhalation every six (6) hours as needed for Wheezing. No current facility-administered medications for this visit.         Past Medical History:   Diagnosis Date    Allergic rhinitis     Breast cancer ([facility] [address] 03/2020    Dr Melany Buckley; ER/DE+, HER-2 neg infiltrating ductal ca 2 sites LEFT breast; Dr Hurtado Lav adenoma     1/15 Dr Mo Ruano; polyp 2006 Dr Ott Rang COPD     on films    DJD (degenerative joint disease)     Dyslipidemia     FHx: heart disease     GERD (gastroesophageal reflux disease)     s/p dilation Dr. Juan Kelly 2006; [facility] and esophagitis 1/15 Dr Anand Gagnon H/O cardiac catheterization 02/2021    Dr Menezes Smith [facility]; LAD mild irreg, L Cx clean, RCA heavy calc throughout, occluded midsegment, s/p cutting balloon and angioscult, STEPHANE x3    H/O echocardiogram 02/2021    [facility]; ef 56%, gr 2dd, mild MR    Monoclonal gammopathy 2011 Dr. Bi Orellana Osteoporosis Dr. Kala Van, t score -0.6 spine, -2.7 hip     took forteo 2012 to 2014 Dr Herring    Recurrent BCC (basal cell carcinoma)     Dr. Cherise Hoskins STEMI (ST elevation myocardial infarction) [facility]) 02/2021    [facility]; inf/post MI; cath Dr Riri Smith showed cutting balloon angioplasty, angiosculpt, STEPHANE x3 heavily calc RCA    Tobacco dependence syndrome            Past Surgical History:   Procedure Laterality Date    HX BREAST LUMPECTOMY Left 6/30/2020    Left Sentinal Node Biopsy performed by Romana Neptune, [facility] at [facility]      Dr. Juan Kelly 2006 polyp; Dr Mo Ruano 1/15 adenoma    HX HYSTERECTOMY  1970s for precancerous lesions in the cervix    HX MASTECTOMY Left 6/30/2020    Left Partial Mastectomy with two site localization performed by Otis Morris MD at [address] HX ORTHOPAEDIC      DEXA t score 0.32 spine, -2.28 hip FRAX 5.3 hip Dr Perla Gayle (12/13); spine 0.68, hip -2.51 w FRAX 7.2 (12/15)    HX ORTHOPAEDIC  05/2017    RIGHT CTS release and thumb surgery Dr Lang Perez      s/p c spine surgery [facility] 2010?  NEUROLOGICAL PROCEDURE UNLISTED      MMSE 30/30 (12/17); MMSE 20/30 (9/20)    MS BIOPSY OF BREAST, NEEDLE CORE Left 03/2020    seeing Dr Danielle Brock    neg RIGHT breast biopsy Dr. Jennifer Berry:  Ms. Juan Alberto Travis was accompanied by her adult daughter for the clinical interview. The ordered MRI has been scheduled for the 23rd of this month. Consistent with records, Ms. Juan Alberto Travis' daughter reported memory loss which first emerged approximately 3 years ago and has remained stable over that time. Neurologic history is negative for seizures, stroke, syncope, and significant head trauma. Sleep disturbance includes difficulties with sleep maintenance due to daytime napping. Snoring was denied. Pain complaints were denied. There is no significant history of alcohol or illicit substance use. Tobacco use includes 4-5 cigarettes a day. Family history of neurologic illness was denied. With regard to emotional functioning, there is no significant history of psychiatric illness. History is also negative for psychiatric hospitalizations, self-harm behaviors, suicidal ideation, and psychological trauma. Socially, Ms. Juan Alberto Travis has been  since 1. She had been  since the early 63s. However, when asked about this, she said she was  in 2005 and had not been  \"too long\" before his passing. Ms. Juan Alberto Travis has 3 children, 1 daughter and 2 sons.   When asked, however, she indicated that she only had 1 son.  Academically, she completed 12 years of education and retired in 2010.  She was unable to recall what she had done for living but her daughter reminded her that she had been working at [employer].  Hobbies are limited.    Functionally, Ms. Michaels is dependent forIADLs but is independent for ADLs.  She is no longer driving.      MENTAL STATUS:    Sensorium  Awake, Aware, Alert   Orientation person, place, situation, day of week and year   Relations passive   Eye Contact appropriate   Appearance:  age appropriate   Motor Behavior:  within normal limits   Speech:  normal pitch and normal volume   Vocabulary average   Thought Process: disorganized   Thought Content free of delusions and free of hallucinations   Suicidal ideations none   Homicidal ideations none   Mood:  euthymic   Affect:  mood-congruent   Memory recent  impaired   Memory remote:  impaired   Concentration:  impaired   Abstraction:  concrete   Insight:  limited   Reliability poor   Judgment:  limited         DIAGNOSTIC IMPRESSIONS:  1. Cognitive Decline: R/O Major Neurocognitive Disorder  2.Anxiety about health      PLAN:  1. Complete a comprehensive neuropsychological assessment to provide a differential diagnosis of presenting concerns as well as to assist with disposition and treatment planning as appropriate.  2. Consider compensatory and remedial cognitive training.  3. Consider nonpharmacological interventions for mood disorder.  4. Consider an adaptive driving evaluation.      [id number] x 1 Review of records. Face to face interview w/ patient. Determine test protocol: 60 minutes. Total 1 unit      Vern Castrejon, PHD  Licensed Clinical Psychologist    This note was created using voice recognition software. Despite editing, there may be syntax errors.       This note will not be viewable in [place] for the following reason(s). This is a Psychotherapy Note. (Behavorial Health Providers  Only)

## 2021-03-12 ENCOUNTER — OFFICE VISIT (OUTPATIENT)
Dept: NEUROLOGY | Age: 81
End: 2021-03-12
Payer: MEDICARE

## 2021-03-12 DIAGNOSIS — F01.50 MIXED ALZHEIMER'S AND VASCULAR DEMENTIA (HCC): Primary | ICD-10-CM

## 2021-03-12 DIAGNOSIS — G30.9 MIXED ALZHEIMER'S AND VASCULAR DEMENTIA (HCC): Primary | ICD-10-CM

## 2021-03-12 DIAGNOSIS — F02.80 MIXED ALZHEIMER'S AND VASCULAR DEMENTIA (HCC): Primary | ICD-10-CM

## 2021-03-12 DIAGNOSIS — F41.8 ANXIETY ABOUT HEALTH: ICD-10-CM

## 2021-03-12 PROCEDURE — 96138 PSYCL/NRPSYC TECH 1ST: CPT | Performed by: PSYCHOLOGIST

## 2021-03-12 PROCEDURE — 96137 PSYCL/NRPSYC TST PHY/QHP EA: CPT | Performed by: PSYCHOLOGIST

## 2021-03-12 PROCEDURE — 96132 NRPSYC TST EVAL PHYS/QHP 1ST: CPT | Performed by: PSYCHOLOGIST

## 2021-03-12 PROCEDURE — 96139 PSYCL/NRPSYC TST TECH EA: CPT | Performed by: PSYCHOLOGIST

## 2021-03-12 PROCEDURE — 96136 PSYCL/NRPSYC TST PHY/QHP 1ST: CPT | Performed by: PSYCHOLOGIST

## 2021-03-12 PROCEDURE — 96133 NRPSYC TST EVAL PHYS/QHP EA: CPT | Performed by: PSYCHOLOGIST

## 2021-03-12 NOTE — PROGRESS NOTES
Luana Calvin [facility] [address]  Office:  [phone]  Fax: [phone]                  Neuropsychological Evaluation Report      PATIENT HISTORY (OBTAINED DURING INITIAL CLINICAL EVALUATION):    Luana Calvin [facility] [address]  Office:  [phone]  Fax: [phone]                  Initial Office Exam  Patient Name: Genny Farnsworth  Age: [de-identified] y.o. Gender: female   Handedness: right handed   Presenting Concern: memory loss  Primary Care Physician: Laverne Garibay MD  Referring Provider: Adonis Jacob NP      REASON FOR REFERRAL:  This comprehensive and medically necessary neuropsychological assessment was requested to assist a differential diagnosis of memory complaints. The use and purpose of this examination, as well as the extent and limitations of confidentiality, were explained prior to obtaining permission to participate. Instructions were provided regarding the necessity to put forth optimal effort and answer questions truthfully in order to obtain reliable and accurate test results. REVIEW OF RECORDS:  Ms. Yael Trujillo was referred by neurology for a work-up of memory loss. Neuropsychiatric features, wandering, and dangerous behaviors have been denied. Ms. Yael Trujillo' daughter has noted memory loss over the last 2 to 3 years. Ms. Yael Trujillo resides with her daughter and is dependent on her for medication management and bill payment. She remains independent for bathing, grooming, and dressing. Ms. Yael Trujillo was recently admitted to [facility] for a heart attack. Ms. Yael Trujillo is home alone during the day while her daughter works. Her daughter recently went out of town for 4 days and Ms. Yael Trujillo stayed with her son. An MRI has been ordered.     Current Outpatient Medications   Medication Sig    albuterol (PROVENTIL HFA, VENTOLIN HFA, PROAIR HFA) 90 mcg/actuation inhaler Take 1 Puff by inhalation every six (6) hours as needed for Wheezing.  ticagrelor (BRILINTA) 90 mg tablet Take 1 Tab by mouth two (2) times a day.  aspirin 81 mg chewable tablet Take 1 Tab by mouth daily.  metoprolol tartrate (LOPRESSOR) 25 mg tablet Take 1 Tab by mouth two (2) times a day.  letrozole (FEMARA) 2.5 mg tablet Take 2.5 mg by mouth daily.  amLODIPine (NORVASC) 5 mg tablet Take 1 Tab by mouth daily.  atorvastatin (LIPITOR) 80 mg tablet Take 1 Tab by mouth daily.  ezetimibe (Zetia) 10 mg tablet Take 1 Tab by mouth daily.  tiotropium (Spiriva with HandiHaler) 18 mcg inhalation capsule Take 1 Cap by inhalation daily.  CALCIUM CARBONATE (CALCIUM 500 PO) Take  by mouth daily. No current facility-administered medications for this visit.         Past Medical History:   Diagnosis Date    Allergic rhinitis     Breast cancer ([facility] [address] 03/2020    Dr Kala Howard; ER/CO+, HER-2 neg infiltrating ductal ca 2 sites LEFT breast; Dr Daniel Gasca adenoma     1/15 Dr Bedford Goldmann; polyp 2006 Dr Idania Lynch COPD     on films    DJD (degenerative joint disease)     Dyslipidemia     FHx: heart disease     GERD (gastroesophageal reflux disease)     s/p dilation Dr. Johnie Tao 2006; [place] and esophagitis 1/15 Dr Cristiano Nicholson H/O cardiac catheterization 02/2021    Dr Latia Ramirez [facility]; LAD mild irreg, L Cx clean, RCA heavy calc throughout, occluded midsegment, s/p cutting balloon and angioscult, STEPHANE x3    H/O echocardiogram 02/2021    [facility]; ef 56%, gr 2dd, mild MR    Monoclonal gammopathy 2011 Dr. Lorena Randhawa Osteoporosis Dr. Johny Landeros, t score -0.6 spine, -2.7 hip     took forteo 2012 to 2014 Dr Herring    Recurrent BCC (basal cell carcinoma)     Dr. Thuan Castañeda STEMI (ST elevation myocardial infarction) [facility]) 02/2021    [facility]; inf/post MI; cath Dr Latia Ramirez showed cutting balloon angioplasty, angiosculpt, STEPHANE x3 heavily calc RCA    Tobacco dependence syndrome Past Surgical History:   Procedure Laterality Date    HX BREAST LUMPECTOMY Left 6/30/2020    Left Sentinal Node Biopsy performed by Reuben Singh MD at [facility] Walterine Marino Russo 2006 polyp; Dr Gurrola Anchors 1/15 adenoma    HX HYSTERECTOMY  1970s    for precancerous lesions in the cervix    HX MASTECTOMY Left 6/30/2020    Left Partial Mastectomy with two site localization performed by Reuben Singh MD at [facility] MAIN OR    HX ORTHOPAEDIC      DEXA t score 0.32 spine, -2.28 hip FRAX 5.3 hip Dr Yaquelin Pina (12/13); spine 0.68, hip -2.51 w FRAX 7.2 (12/15)    HX ORTHOPAEDIC  05/2017    RIGHT CTS release and thumb surgery Dr Ervin Alvarez      s/p c spine surgery [facility] 2010?  NEUROLOGICAL PROCEDURE UNLISTED      MMSE 30/30 (12/17); MMSE 20/30 (9/20)    ME BIOPSY OF BREAST, NEEDLE CORE Left 03/2020    seeing Dr Issac Bradford    neg RIGHT breast biopsy Dr. Osborne Riding:  Ms. Sun Voss was accompanied by her adult daughter for the clinical interview. The ordered MRI has been scheduled for the 23rd of this month. Consistent with records, Ms. Sun Voss' daughter reported memory loss which first emerged approximately 3 years ago and has remained stable over that time. Neurologic history is negative for seizures, stroke, syncope, and significant head trauma. Sleep disturbance includes difficulties with sleep maintenance due to daytime napping. Snoring was denied. Pain complaints were denied. There is no significant history of alcohol or illicit substance use. Tobacco use includes 4-5 cigarettes a day. Family history of neurologic illness was denied. With regard to emotional functioning, there is no significant history of psychiatric illness. History is also negative for psychiatric hospitalizations, self-harm behaviors, suicidal ideation, and psychological trauma.     Socially, Ms. Sun Voss has been  since 1997. She had been  since the early 63s. However, when asked about this, she said she was  in 2005 and had not been  \"too long\" before his passing. Ms. Dimitris Crisostmoo has 3 children, one daughter and two sons. When asked, however, she indicated that she only had one son. Academically, she completed 12 years of education and retired in 2010. She was unable to recall what she had done for living but her daughter reminded her that she had been working at [employer]. Hobbies are limited. Functionally, Ms. Dimitris Crisostomo is dependent for iADLs but is independent for ADLs. She is no longer driving. MENTAL STATUS:    Sensorium  Awake, Aware, Alert   Orientation person, place, situation, day of week and year   Relations passive   Eye Contact appropriate   Appearance:  age appropriate   Motor Behavior:  within normal limits   Speech:  normal pitch and normal volume   Vocabulary average   Thought Process: disorganized   Thought Content free of delusions and free of hallucinations   Suicidal ideations none   Homicidal ideations none   Mood:  euthymic   Affect:  mood-congruent   Memory recent  impaired   Memory remote:  impaired   Concentration:  impaired   Abstraction:  concrete   Insight:  limited   Reliability poor   Judgment:  limited       METHODS OF ASSESSMENT (Current Evaluation):  Clinician Administered: Adaptive Behavior Assessment System (ABAS-3)  Clinical Assessment of Geriatric Emotional Status (CASE)  Clock Drawing Task  Geriatric Depression Scale: Short Form (GDS)  Modified Mini-Mental Status Exam (3MS)  Test of Premorbid Functioning (TOPF)    Technician Administered:  Allie Dementia Rating Scale-2  Neuropsychological Assessment Battery-Select Subtests  [place] Functional Living Scale (TFLS)  Trailmaking Test    TEST OBSERVATIONS:  Ms. Dimitris Crisostomo arrived promptly for the testing session.   Dress and grooming were appropriate; physical presentation was unchanged from that observed during the clinical interview. Speech was fluent and coherent with normal rate, rhythm, tone, and volume. Comprehension difficulties were noted for more complex instructions. No unusual behaviors or psychomotor abnormalities were observed. Thought process was logical. Thought content showed no apparent delusional ideation. Auditory and visual hallucinations were denied and there was no obvious response to internal stimuli. Affect was congruent with the euthymic mood conveyed. Ms. Hailey Leon was adequately cooperative and appeared to put forth good effort throughout this examination. Rapport with the examiner was adequately established and maintained. Minimal prompting was required. Comprehension of test instructions was not problematic. Given the above observations, plus comments contained in the Mental Status section, the results of this examination are regarded as reasonably reliable and valid. TEST RESULTS:  Quantitative test results are derived from comparisons to age and education corrected cohort normative data, where applicable. Percentiles are included in these instances. Qualitative test results are determined using clinical observations. General Orientation and Awareness:       Orientation to person yes   Time no  Place no  Circumstance yes                     Sensory-Perceptual and Motor Functioning:    Visual and auditory acuity:  Glasses        Gait and balance:   unsteady                 Cognitive Screening: On the Modified Mini-Mental Status Exam, Ms. Hailey Leon obtained a severely impaired score. Difficulties were noted in the following areas: Mental reversal, immediate and delayed spontaneous/cued recall, orientation (misidentified the month, date, and state), confrontational naming, verbal fluency, abstract reasoning, and figure copy. Clock drawing was significantly impaired.     Attention/Concentration:       Simple visuomotor tracking (<1 percentile)           Severely Impaired    Visuospatial and Constructional Praxis:     Visual discrimination (5 percentile)                        Mildly Impaired     Language:         Object naming (<1 percentile)                                                         Severely Impaired     Auditory comprehension (1 percentile)           Severely Impaired     Executive Function:  Ability to think flexibly, Trailmaking B (N/A)                      Discontinued due to confusion    Dementia Rating Scale -2  Scale:     Age-Corrected MOANS Scaled Score  Percentile  Attention    12       72-81  Initiation and Preservation  2       <1   Construction    10       41-59  Conceptualization   6       6-10  Memory    2       <1  Total Score     2           Intellectual and Basic Cognitive Functioning:  ACS Test of pre-morbid functioning reading recognition lower limit estimated WAIS-IV FSIQ score:      Estimated full scale IQ: 89   Percentile: 23.2     Rating: Low Average    Adaptive Behavior Measure for Independent Living ([place] Functional Living Scale):  Time                 <2 percentile  Severely Impaired  Money and calculation   <2 percentile  Severely Impaired  Communication     <2 percentile  Severely Impaired  Memory      <2 percentile  Severely Impaired  Total                 <1 percentile  Severely Impaired    Adaptive Behavior (Adaptive Behavior Assessment System)  General Adaptive Composite:  83   Percentile: 13  Low Average   Conceptual:  72    Percentile: 3  Moderately Impaired   Social:  84    Percentile: 14  Low Average   Practical:  89    Percentile: 23  Low Average    Emotional Functioning:  Screening of Emotional/Psychiatric Status:  Level of self-reported depression   (2/15)  Normal Range    On a measure of general emotional functioning completed by Ms. Suyapa Oquendo' daughter, she reported observing the following: Cognitive deficits and low energy. IMPRESSIONS/RECOMMENDATIONS:  Test performance was consistent with advanced dementia.   With regard to etiology I suspect a mix of Alzheimer's and vascular pathologies. Neuroimaging will be helpful in providing correlate for this. Cognitive deficiencies were diffuse and noted in the following areas: orientation, memory, naming, auditory comprehension, verbal fluency, abstract reasoning, visuospatial abilities, and executive function. There also appears to be a significant amount of functional impairment associated with Ms. Davila cognitive decline. For this reason, it is clear that Ms. Dimitris Crisostomo will require 24/7 care and assistance across iADLs as well as ADLs. It should also be noted that Ms. Michaels lacks capacity for medical and financial decision making, voting, marrying, and owning a fire arm. Guardianship should be assigned and other areas of advanced care planning addressed. Ms. Dimitris Frias family is encouraged to connect with their local Department of  to determine her eligibility for assistance and respite care. Behavior and social activity commensurate with Ms. Dimitris Frias current cognitive functioning is also encouraged. This will assist with sleep disturbance and will help to mitigate the development of behavioral disturbance and neuropsychiatric features. More general recommendations can be found below. DIAGNOSTIC IMPRESSIONS:  1. Mixed Vascular and Alzheimer's Dementia, severe  2. Anxiety about health    GENERAL RECOMMENDATIONS:  · When planning daily activities, consider the following:  · Focus on abilities rather than on limitations as much as possible. Caregiver should give honest praise and encouragement. · A predictable routine is important. It decreases anxiety and reduce his need to adapt to change  · Break down tasks into simple steps.   Provide one step at a time instructions or cues and use short words and simple sentences  · Except that each daily task will gradually take more time to do as the disease progresses  · Encourage activities that have little risk of failure. Give credit for trying rather than for completing a task  · Reduce distractions such as background noise to improve the ability to concentrate on task  · Allow the person to make choices to improve self-esteem. Confusion, anxiety, or agitation in response to a choice or clues that making that particular decision may be too difficult  · People with dementia are highly sensitive to the moods of people around him. Caregiver should try to remain calm and patient and avoid criticism. · Early fatigue and short attention span are common in individuals with dementing diseases. When possible, plan activities at the best time of day and allow time for rest.     · Individuals with dementia symptoms often have heightened sensitivities and can therefore be more vulnerable to distress and agitation. Creating a calm home will be important; the following are recommended:  · If reflections seem to call cause visual disturbance, cover reflective surfaces such as mirrors, windows, or glass tables or shiny appliances. · A flickering television can be disturbing, or could even induce hallucinations. · Reduce background noise such as radio or television. This may lessen confusion or anxiety and also increase the ability to concentrate. · Limit the number of activities or visitors if they are causing fatigue or agitation  · Allow plenty of time to engage in everyday tasks without rushing. This may mean that to get to the doctor's appointment on time, you may need to begin to get ready to hours beforehand  · Simplify the living areas of the home if too much clutter is confusing    · Plan ahead for caregiver emergencies  · Make arrangements for others to assume care at a moments notice: Family, friend, neighbor, home health care agency offering 24-hour care, group home, or nursing home  · Post emergency information and a prominent place such as the refrigerator door or next to the phone.   Include: Family names, addresses, work and home phone numbers, whom to contact in an emergency, whom you have selected to make healthcare decisions for you if you are incapacitated, doctor's name and phone number (including weekend and evenings), name and address of preferred hospital, insurance names and policy numbers, the list of medications, schedules, and where they are kept for both the person with dementia and the caregiver     Currently, there is a deficit in optimal levels of behavior and social activation. Therefore,  programming is recommended (e.g. [employer] [facility] program)    · Fall prevention  · Use nonskid wax or vinyl floors  · Remove any throw rugs are fast and then to the floor  · Use nonskid surfaces such as textured strips or decals in the bathtub or shower  · Install grab bars near the toilet and in the bath. Consider a raised toilet seat, shower stool, and a hand-held shower  · Make sure hallways and stairways are well lit and free of clutter  · Use a night light in the back bedroom and/or bathroom  · Install secure railings on all stairs and marked the edges of stairs with brightly colored tape. Install Brown on the top of stairs if needed  · Remove any tripping hazards such as extension cords  · Encourage the use of sturdy chairs that have high seats and strong arms  · Make sure the person with dementia wear shoes that are comfortable and safe  · Correct or treat other health problems that may increase the risk of falling such as hearing and vision loss or arthritis  · Closely monitor medication use, especially those that could cause dizziness or confusion. Check with the doctor immediately if you suspect a medication reaction  · Consider the use of an emergency alarm to alert others if there is a fall    · Fire hazards  · Make sure there is a smoke alarm that works.   If possible, connected to the apartment office or fire station so that someone in addition to the person with dementia will be alerted if it goes off  · Explore the possibility of a microwave.  If the person with Alzheimer's disease already knows how to operate a microwave or can learn to do so, remove the knobs of the regular stove or disconnected  · Limit the amount of cooking by bringing and prepared meals, hiring a homemaker, or arranging for home delivered meals  · Call the fire department to see whether there is a program for registering persons with disabilities so that in case of a fire they would be rescued first  · Allow smoking only in one area and only with supervision.  Lock up all smoking materials when not in use    · Creating a safe home  · Remove or lock up sharp or dangerous kitchen utensils such as knives, Food processors, mixers, toaster ovens, and coffee makers  · Remove her lockup sharp outside equipment such as lawnmowers, chain saws, snowblowers, gasoline containers, ladders, boats, and farm implements  · Remove or lock up weapons, especially guns  · Remove her lack of items in the basement such as electric power tools or paint  · Remove her lock up poison such as cleaning supplies, insecticides, or poisonous plants  · Remove her lack of alcohol and drugs  · Remove objects that may be eaten such as buttons, pins, etc.      Thank you for allowing me the opportunity to assist you in Ms. Michaels's care. Please do not hesitate to contact me should you have additional questions that I may not have addressed.    96136 x 1  96137 x 1  96138 x 1  96139 x 4  96132 x 1  96133 x 1    Vern Castrejon, Ph.D.   Licensed Clinical Psychologist        Time Documentation:     96136 x 1 Neuropsych testing/data gathering by Neuropsychologist (1 hour)   63592     96138 x 1  96139 x 4 Test Administration/Data Gathering By Technician: (2.5 hours). 96138 x 1 (first 30 minutes), 96139 x 4 (each additional 30 minutes)     96132 x 1  96133 x 1 Testing Evaluation Services by Neuropsychologist (1 hour, 50 minutes) 96132 x 1 (first hour), 96133 x 1 (50  minutes)     The above includes: Record review. Review of history provided by patient. Review of collaborative information. Testing by Clinician. Review of raw data. Scoring. Report writing of individual tests administered by Clinician. Integration of individual tests administered by psychometrist with NSE/testing by clinician, review of records/history/collaborative information, case Conceptualization, treatment planning, clinical decision making, report writing, coordination Of Care. Psychometry test codes as time spent by psychometrist administering and scoring neurocognitive/psychological tests under supervision of neuropsychologist.  Integral services including scoring of raw data, data interpretation, case conceptualization, report writing etcetera were initiated after the patient finished testing/raw data collected and was completed on the date the report was signed. This note was created using voice recognition software. Despite editing, there may be syntax errors. This note will not be viewable in [employer] for the following reason(s). This is a Psychotherapy Note. ([address], [facility] Providers Only)          I have reviewed the documentation provided by Dr. Michelle Zaragoza, PhD, Linda Rodriguez. Dr. Donita Sanchez is in her second year of Fellowship in Clinical Neuropsychology. Dr. Donita Sanchez is licensed and credentialed to practice in the [facility], is providing the current services via her NPI and licensure, and had been providing similar services prior to her employment with [employer]. No additional insurance billing is done by me on her cases, my NPI is not being used, etc.   I have not had any face to face engagement with her patients, and am providing supervision and consultation services with her as she works towards advancing her career and subspecialities.   I have reviewed the history, the neurocognitive and psychological test results, the medical records available, and the impressions and recommendations generated by Dr. Kari Lopez. We have engaged in peer to peer supervision as needed. I have reviewed history noted in the records, the tests administered, the test scores, and the overall case history and profile and report generated by Dr. Kari Lopez. Dr. Saige Flynn clinical case formulation, diagnostic impressions, and the proposed management plans/treatment recommendations are her own and based on her clinical training, level of expertise, and judgment. Any additional comments, concerns, or recommendations that I am making are offered here: Moderate to severe dementia seen on exam. Concur with recs as noted above. Salmaan A. Colen Eisenmenger, KEVIN  Neuropsychology

## 2021-03-23 ENCOUNTER — HOSPITAL ENCOUNTER (OUTPATIENT)
Age: 81
Discharge: HOME | End: 2021-03-23
Attending: NURSE PRACTITIONER
Payer: MEDICARE

## 2021-03-23 PROCEDURE — 70551 MRI BRAIN STEM W/O DYE: CPT

## 2021-03-30 ENCOUNTER — VIRTUAL VISIT (OUTPATIENT)
Dept: NEUROLOGY | Age: 81
End: 2021-03-30
Payer: MEDICARE

## 2021-03-30 DIAGNOSIS — F41.8 ANXIETY ABOUT HEALTH: ICD-10-CM

## 2021-03-30 DIAGNOSIS — F01.50 MIXED ALZHEIMER'S AND VASCULAR DEMENTIA (HCC): Primary | ICD-10-CM

## 2021-03-30 DIAGNOSIS — G30.9 MIXED ALZHEIMER'S AND VASCULAR DEMENTIA (HCC): Primary | ICD-10-CM

## 2021-03-30 DIAGNOSIS — F02.80 MIXED ALZHEIMER'S AND VASCULAR DEMENTIA (HCC): Primary | ICD-10-CM

## 2021-03-30 PROCEDURE — 90847 FAMILY PSYTX W/PT 50 MIN: CPT | Performed by: PSYCHOLOGIST

## 2021-03-30 NOTE — PROGRESS NOTES
Interactive Psychotherapy/office feedback        Interactive office feedback session with Ms. Lonnie Madrigal and her adult daughter. I reviewed the results of the recent Neuropsychological Evaluation  including the observed areas of neurocognitive strengths and weaknesses. Education was provided regarding my diagnostic impressions, and treatment plan/options were discussed. I also answered numerous questions related to the clinical findings, including the various methods to improve cognition and mood. CBT, psychoeducation, and supportive psychotherapy techniques were utilized. Prior to seeing the patient I reviewed the records, including the previously completed report, the records in [place], and any updated visits from other providers since I saw the patient last.       Diagnoses:      1. Mixed Vascular and Alzheimer's Dementia, severe  2. Anxiety about health                 The patient will follow up with the referring provider, and reported being very pleased with the services provided. [facility] months. [address] psychotherapy with pt and adult daughter. 45 minutes       This note was created using voice recognition software. Despite editing, there may be syntax errors. Tiny Lesch is a [de-identified] y.o. female who was evaluated by an audio-video encounter for concerns as above. Patient identification was verified prior to start of the visit. Pursuant to the emergency declaration under the [facility], [facility]5 waiver authority and the [employer] General Act, this Virtual Visit was conducted, with patient's (and/or legal guardian's) consent, to reduce the patient's risk of exposure to COVID-19 and provide necessary medical care. Services were provided through a synchronous discussion virtually to substitute for in-person clinic visit. I was at home. The patient was at home.     This note will not be viewable in MyChart for the following reason(s). This is a Psychotherapy Note.  ([address] [facility] [address] Providers Only)

## 2021-04-09 ENCOUNTER — VIRTUAL VISIT (OUTPATIENT)
Dept: NEUROLOGY | Age: 81
End: 2021-04-09
Payer: MEDICARE

## 2021-04-09 DIAGNOSIS — G30.9 MIXED ALZHEIMER'S AND VASCULAR DEMENTIA (HCC): Primary | ICD-10-CM

## 2021-04-09 DIAGNOSIS — F01.50 MIXED ALZHEIMER'S AND VASCULAR DEMENTIA (HCC): Primary | ICD-10-CM

## 2021-04-09 DIAGNOSIS — F02.80 MIXED ALZHEIMER'S AND VASCULAR DEMENTIA (HCC): Primary | ICD-10-CM

## 2021-04-09 PROCEDURE — G0463 HOSPITAL OUTPT CLINIC VISIT: HCPCS | Performed by: NURSE PRACTITIONER

## 2021-04-09 PROCEDURE — 99214 OFFICE O/P EST MOD 30 MIN: CPT | Performed by: NURSE PRACTITIONER

## 2021-04-09 NOTE — PROGRESS NOTES
Carmen Margarita presents today for   Chief Complaint   Patient presents with    Dementia     follow up       Is someone accompanying this pt? Daughter, Virtual visit: [phone]    Is the patient using any DME equipment during [address]? no    Depression Screening:  3 most recent PHQ Screens 9/30/2020   Little interest or pleasure in doing things Not at all   Feeling down, depressed, irritable, or hopeless Not at all   Total Score PHQ 2 0       Learning Assessment:  Learning Assessment 2/20/2019   PRIMARY LEARNER Patient   HIGHEST LEVEL OF EDUCATION - PRIMARY LEARNER  DID NOT GRADUATE [facility] PRIMARY LEARNER NONE   CO-LEARNER CAREGIVER No   PRIMARY LANGUAGE ENGLISH   LEARNER PREFERENCE PRIMARY DEMONSTRATION     -     -   ANSWERED BY patient   RELATIONSHIP SELF       Abuse Screening:  Abuse Screening Questionnaire 3/31/2020   Do you ever feel afraid of your partner? N   Are you in a relationship with someone who physically or mentally threatens you? N   Is it safe for you to go home? Y       Fall Risk  Fall Risk Assessment, last 12 mths 3/2/2021   Able to walk? Yes   Fall in past 12 months? 0   Do you feel unsteady? 0   Are you worried about falling 0         Coordination of Care:  1. Have you been to the ER, urgent care clinic since your last visit? Hospitalized since your last visit? no    2. Have you seen or consulted any other health care providers outside of the [address] [place] [address] since your last visit? Include any pap smears or colon screening.  no

## 2021-04-09 NOTE — PROGRESS NOTES
Candelario Márquez is a [de-identified] y.o. female who was seen by synchronous (real-time) audio-video technology on 4/9/2021 for Dementia (follow up)        Assessment & Plan:   Diagnoses and all orders for this visit:    1. Mixed Alzheimer's and vascular dementia ([facility] [address]      We discussed results including MRI of the brain consistent with No evidence of acute intracranial abnormality. Mild cerebral volume loss. Moderate burden of periventricular and subcortical white matter hyperintensities, nonspecific and most likely sequelae of chronic microvascular disease. Reviewed labs with no abnormalities. Discussed safety and encourage follow-up in regards to establishing with pace program.  Patient's daughter says that she will present to the office to  the paperwork that was left for her by Dr. Alannah Chowdhury. Will maintain 6-month follow-up or sooner as needed. All questions addressed and patient and patient's daughter are agreeable with plan of care. 712  Subjective: This is an 81-year-old female who presents for follow-up memory loss. She is accompanied by her daughter Yusef Michel.  In the interim she had evaluation complete by Dr. Terry Benites. Per documentation diagnosed with mixed dementia including vascular and Alzheimer's etiology, severe. In the interim they expressed no new concerns. Her daughter is going to present to the office to  the paperwork on the pace program.  Patient lives at home with her daughter Yusef Michel who occasionally travels for several days due to her job. At those times she will stay with her son. No clear plans for 24-hour care otherwise at this time. Denies aggression, hallucinations, or wandering. No other safety concerns. They have no additional questions at this time. Prior to Admission medications    Medication Sig Start Date End Date Taking?  Authorizing Provider   albuterol (PROVENTIL HFA, VENTOLIN HFA, PROAIR HFA) 90 mcg/actuation inhaler Take 1 Puff by inhalation every six (6) hours as needed for Wheezing. 3/8/21  Yes Kelsey Garcia MD   ticagrelor (BRILINTA) 90 mg tablet Take 1 Tab by mouth two (2) times a day. 2/11/21  Yes Lalita Dickens PA   aspirin 81 mg chewable tablet Take 1 Tab by mouth daily. 2/12/21  Yes Lalita Dickens PA   metoprolol tartrate (LOPRESSOR) 25 mg tablet Take 1 Tab by mouth two (2) times a day. 2/11/21  Yes Lalita Dickens PA   letrozole [facility]) 2.5 mg tablet Take 2.5 mg by mouth daily. 5/4/20  Yes Provider, Historical   amLODIPine (NORVASC) 5 mg tablet Take 1 Tab by mouth daily. 3/11/20  Yes Kelsey Garcia MD   atorvastatin (LIPITOR) 80 mg tablet Take 1 Tab by mouth daily. 3/11/20  Yes Kelsey Garcia MD   ezetimibe (Zetia) 10 mg tablet Take 1 Tab by mouth daily. 3/11/20  Yes Kelsey Garcia MD   tiotropium (Spiriva with HandiHaler) 18 mcg inhalation capsule Take 1 Cap by inhalation daily. 3/11/20  Yes Kelsey Garcia MD   CALCIUM CARBONATE (CALCIUM 500 PO) Take  by mouth daily.    Yes Provider, Historical     Patient Active Problem List   Diagnosis Code    Colon polyps Dr. Alanna Dunn 2006 K63.5    Osteoporosis Dr. Emerald Philippe M81.0    Monoclonal gammopathy 2011 Dr. Mayda Laws D47.2    COPD on films J44.9    Dyslipidemia E78.5    Arthritis, degenerative M19.90    Gastroesophageal reflux disease without esophagitis K21.9    Advance directive discussed with patient Z71.89    Tobacco dependence syndrome F17.200    Primary hypertension I10    Cancer of overlapping sites of left female breast ([address] C50.812    Acute inferior myocardial infarction ([address] I21.19    CHD (coronary heart disease) I25.10     Patient Active Problem List    Diagnosis Date Noted    Acute inferior myocardial infarction ([address] 02/08/2021     Priority: 1 - One    CHD (coronary heart disease) 02/15/2021    Cancer of overlapping sites of left female breast ([address] 03/23/2020    Primary hypertension 04/24/2018    Tobacco dependence syndrome 05/17/2016    Advance directive discussed with patient 11/18/2015    Arthritis, degenerative 11/15/2015    Gastroesophageal reflux disease without esophagitis 11/15/2015    Dyslipidemia 09/12/2013    COPD on films 04/02/2012    Osteoporosis Dr. Emerald Philippe 12/02/2011    Monoclonal gammopathy 2011 Dr. Mayda Laws 12/02/2011    Colon polyps Dr. Alanna Dunn 2006      Current Outpatient Medications   Medication Sig Dispense Refill    albuterol (PROVENTIL HFA, VENTOLIN HFA, PROAIR HFA) 90 mcg/actuation inhaler Take 1 Puff by inhalation every six (6) hours as needed for Wheezing. 1 Inhaler 5    ticagrelor (BRILINTA) 90 mg tablet Take 1 Tab by mouth two (2) times a day. 180 Tab 3    aspirin 81 mg chewable tablet Take 1 Tab by mouth daily. 180 Tab 1    metoprolol tartrate (LOPRESSOR) 25 mg tablet Take 1 Tab by mouth two (2) times a day. 180 Tab 3    letrozole (FEMARA) 2.5 mg tablet Take 2.5 mg by mouth daily.  amLODIPine (NORVASC) 5 mg tablet Take 1 Tab by mouth daily. 90 Tab 3    atorvastatin (LIPITOR) 80 mg tablet Take 1 Tab by mouth daily. 90 Tab 3    ezetimibe (Zetia) 10 mg tablet Take 1 Tab by mouth daily. 90 Tab 3    tiotropium (Spiriva with HandiHaler) 18 mcg inhalation capsule Take 1 Cap by inhalation daily. 90 Cap 3    CALCIUM CARBONATE (CALCIUM 500 PO) Take  by mouth daily.        Allergies   Allergen Reactions    Penicillins Rash and Hives     Past Medical History:   Diagnosis Date    Allergic rhinitis     Breast cancer ([facility] [address] 03/2020    Dr Cheryle Asp; ER/MO+, HER-2 neg infiltrating ductal ca 2 sites LEFT breast; Dr Dominique Dy adenoma     1/15 Dr Gordy Olsen; polyp 2006 Dr Duane Ortega COPD     on films    Dementia [facility]) 03/2021    mixed vascular and AD Dr Kalia MUNGUIAD (degenerative joint disease)     Dyslipidemia     FHx: heart disease     GERD (gastroesophageal reflux disease)     s/p dilation Dr. Alanna Dunn 2006; [place] and esophagitis 1/15 Dr Ronquillo Ran H/O cardiac catheterization 02/2021     [facility]; LAD mild irreg, L Cx clean, RCA heavy calc throughout, occluded midsegment, s/p cutting balloon and angioscult, STEPHANE x3    H/O echocardiogram 02/2021    [address]; ef 56%, gr 2dd, mild MR    Monoclonal gammopathy 2011 Dr. Sousa Plants Osteoporosis Dr. Prem Stokes, t score -0.6 spine, -2.7 hip     took forteo 2012 to 2014 Dr Herring    Recurrent BCC (basal cell carcinoma)     Dr. Lety Cordero STEMI (ST elevation myocardial infarction) [facility]) 02/2021    [address]; inf/post MI; cath Dr Nora Bethea showed cutting balloon angioplasty, angiosculpt, STEPHANE x3 heavily calc RCA    Tobacco dependence syndrome      Past Surgical History:   Procedure Laterality Date    HX BREAST LUMPECTOMY Left 6/30/2020    Left Sentinal Node Biopsy performed by Evangelist Melton MD at [address] [place] [address]      Dr. Kanwal Herbert 2006 polyp; Dr Carmelita Grullon 1/15 adenoma    HX HYSTERECTOMY  1970s    for precancerous lesions in the cervix    HX MASTECTOMY Left 6/30/2020    Left Partial Mastectomy with two site localization performed by Evangelist Melton MD at [address] [place] [address] MAIN OR    HX ORTHOPAEDIC      DEXA t score 0.32 spine, -2.28 hip FRAX 5.3 hip Dr Prem Stokes (12/13); spine 0.68, hip -2.51 w FRAX 7.2 (12/15)    HX ORTHOPAEDIC  05/2017    RIGHT CTS release and thumb surgery Dr Terrence Roque      s/p c spine surgery [address] 2010?  NEUROLOGICAL PROCEDURE UNLISTED      MMSE 30/30 (12/17); MMSE 20/30 (9/20)    SD BIOPSY OF BREAST, NEEDLE CORE Left 03/2020    seeing Dr Kamala Houser    neg RIGHT breast biopsy Dr. Paz Osorio     Family History   Problem Relation Age of Onset    Heart Attack Father     Kidney Disease Mother         ADPKD    Diabetes Sister     Cancer Sister         leukemia     Social History     Tobacco Use    Smoking status: Current Every Day Smoker     Packs/day: 1.00     Years: 50.00     Pack years: 50.00     Types: Cigarettes    Smokeless tobacco: Never Used   Substance Use Topics    Alcohol use:  No Review of Systems  GENERAL: Denies fever or fatigue  CARDIAC: No CP or SOB  PULMONARY: No cough of SOB  MUSCULOSKELETAL: No new joint pain  NEURO: SEE HPI      Objective:     Patient-Reported Vitals 4/9/2021   Patient-Reported Weight 120lb   Patient-Reported Height -   Patient-Reported Systolic  281   Patient-Reported Diastolic 79                                        Additional exam findings: This is a very pleasant 81-year-old female. She is alert and in no apparent distress. Speech is clear. No facial asymmetry. Appears appropriately dressed and well cared for. Gait deferred. She is seated in chair. We discussed the expected course, resolution and complications of the diagnosis(es) in detail. Medication risks, benefits, costs, interactions, and alternatives were discussed as indicated. I advised her to contact the office if her condition worsens, changes or fails to improve as anticipated. She expressed understanding with the diagnosis(es) and plan. Eufemia Burns, was evaluated through a synchronous (real-time) audio-video encounter. The patient (or guardian if applicable) is aware that this is a billable service. Verbal consent to proceed has been obtained within the past 12 months. The visit was conducted pursuant to the emergency declaration under the [facility], [address] [place] [address] authority and the [employer] and [employer] General Act. Patient identification was verified, and a caregiver was present when appropriate. The patient was located in a state where the provider was credentialed to provide care.     Riri Concepcion NP

## 2021-05-01 ENCOUNTER — PATIENT MESSAGE (OUTPATIENT)
Dept: INTERNAL MEDICINE CLINIC | Age: 81
End: 2021-05-01

## 2021-05-04 ENCOUNTER — TELEPHONE (OUTPATIENT)
Dept: INTERNAL MEDICINE CLINIC | Age: 81
End: 2021-05-04

## 2021-05-04 DIAGNOSIS — C44.90 SKIN CANCER: Primary | ICD-10-CM

## 2021-05-04 NOTE — TELEPHONE ENCOUNTER
Patient wants referral to go to [facility] She has seen derm in the past for cancer spot removal.

## 2021-05-04 NOTE — TELEPHONE ENCOUNTER
----- Message from Elliott Woodson sent at 5/4/2021  2:35 PM EDT -----  Regarding: FW: referral    ----- Message -----  From: Dania Starkey: 5/4/2021  11:07 AM EDT  To: [facility] Front Office  Subject: referral                                         Pt's granddaughter states Dr. Norma Gomez referred pt to skin physician for her cancer.  She would like to change this specialist, but needs Dr. Norma Gomez to sumbit a new referral. Please call granddaughter, Tim Yen @ [phone]

## 2021-05-11 ENCOUNTER — HOSPITAL ENCOUNTER (OUTPATIENT)
Dept: MAMMOGRAPHY | Age: 81
Discharge: HOME | End: 2021-05-11
Attending: INTERNAL MEDICINE
Payer: MEDICARE

## 2021-05-11 DIAGNOSIS — Z12.31 VISIT FOR SCREENING MAMMOGRAM: ICD-10-CM

## 2021-05-11 PROCEDURE — 77063 BREAST TOMOSYNTHESIS BI: CPT

## 2021-05-20 ENCOUNTER — HOSPITAL ENCOUNTER (OUTPATIENT)
Dept: RADIATION THERAPY | Age: 81
Discharge: HOME | End: 2021-05-20

## 2021-05-23 ENCOUNTER — HOSPITAL ENCOUNTER (EMERGENCY)
Age: 81
Discharge: HOME | End: 2021-05-23
Attending: EMERGENCY MEDICINE
Payer: MEDICARE

## 2021-05-23 VITALS
HEART RATE: 77 BPM | RESPIRATION RATE: 17 BRPM | OXYGEN SATURATION: 97 % | WEIGHT: 121 LBS | TEMPERATURE: 98.5 F | SYSTOLIC BLOOD PRESSURE: 146 MMHG | DIASTOLIC BLOOD PRESSURE: 90 MMHG | BODY MASS INDEX: 20.16 KG/M2 | HEIGHT: 65 IN
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
## 2021-05-23 DIAGNOSIS — T14.8XXA MULTIPLE SKIN TEARS: ICD-10-CM

## 2021-05-23 PROCEDURE — 99282 EMERGENCY DEPT VISIT SF MDM: CPT

## 2021-05-23 NOTE — ED PROVIDER NOTES
[de-identified] yo F with PMHx dementia presents with skin tears to left arm and right hand after accidental fall. Pt unable to give reliable history, states she's not sure what happened. Per family, pt normally lives with grand-daughter but is staying with son this weekend since caregiver out of town. Pt family heard pt fall and noted bleeding to left arm area, think she may have hit a dresser. Pt denies neck pain, HA, no cp/sob, no other injuries. Pt has had a couple weeks of cough, but no fevers, no other symptoms. Pt is on brilinta but at baseline per family.            Past Medical History:   Diagnosis Date    Allergic rhinitis     Breast cancer ([facility] [address] 03/2020    Dr Scar Cormier; ER/AL+, HER-2 neg infiltrating ductal ca 2 sites LEFT breast; Dr Philippe Bullion adenoma     1/15 Dr Iris Martinez; polyp 2006 Dr Paniagua Pouch COPD     on films    Dementia [facility]) 03/2021    mixed vascular and AD Dr Manpreet Stevens DJD (degenerative joint disease)     Dyslipidemia     FHx: heart disease     GERD (gastroesophageal reflux disease)     s/p dilation Dr. Barrios Poster 2006; [facility] and esophagitis 1/15 Dr Jerilynn Spatz H/O cardiac catheterization 02/2021    Dr Kasie Rogel [facility]; LAD mild irreg, L Cx clean, RCA heavy calc throughout, occluded midsegment, s/p cutting balloon and angioscult, STEPHANE x3    H/O echocardiogram 02/2021    [facility]; ef 56%, gr 2dd, mild MR    Monoclonal gammopathy 2011 Dr. Rosendo Norris Osteoporosis Dr. Naheed Marques, t score -0.6 spine, -2.7 hip     took forteo 2012 to 2014 Dr Herring    Recurrent BCC (basal cell carcinoma)     Dr. Stephanie Doherty STEMI (ST elevation myocardial infarction) [facility]) 02/2021    [facility]; inf/post MI; cath Dr Kasie Rogel showed cutting balloon angioplasty, angiosculpt, STEPHANE x3 heavily calc RCA    Tobacco dependence syndrome        Past Surgical History:   Procedure Laterality Date    HX BREAST LUMPECTOMY Left 6/30/2020    Left Sentinal Node Biopsy performed by Dillon Moreno MD at [facility]       [place]  2006 polyp; Dr Lalita Diaz 1/15 adenoma    HX HYSTERECTOMY  1970s    for precancerous lesions in the cervix    HX MASTECTOMY Left 6/30/2020    Left Partial Mastectomy with two site localization performed by Ino Lucas MD at [address] t score 0.32 spine, -2.28 hip FRAX 5.3 hip Dr Rita Lara (12/13); spine 0.68, hip -2.51 w FRAX 7.2 (12/15)    HX ORTHOPAEDIC  05/2017    RIGHT CTS release and thumb surgery Dr Wally Sanchez      s/p c spine surgery [facility] 2010?  NEUROLOGICAL PROCEDURE UNLISTED      MMSE 30/30 (12/17); MMSE 20/30 (9/20)    OR BIOPSY OF BREAST, NEEDLE CORE Left 03/2020    seeing Dr Luca Henning    neg RIGHT breast biopsy Dr. Reny Brooks         Family History:   Problem Relation Age of Onset    Heart Attack Father     Kidney Disease Mother         ADPKD    Diabetes Sister     Cancer Sister         leukemia       Social History     Socioeconomic History    Marital status:      Spouse name: Not on file    Number of children: 3    Years of education: Not on file    Highest education level: Not on file   Occupational History    Occupation: [employer]   Tobacco Use    Smoking status: Current Every Day Smoker     Packs/day: 1.00     Years: 50.00     Pack years: 50.00     Types: Cigarettes    Smokeless tobacco: Never Used   Substance and Sexual Activity    Alcohol use: No    Drug use: No    Sexual activity: Not on file   Other Topics Concern    Not on file   Social History Narrative    Not on file     Social Determinants of Health     Financial Resource Strain:     Difficulty of Paying Living Expenses:    Food Insecurity:     Worried About [address] in the Last Year:     [address] [employer] in the Last Year:    Transportation Needs:     Lack of Transportation (Medical):      Lack of Transportation (Non-Medical):    Physical Activity:     Days of Exercise per Week:     Minutes of Exercise per Session:    Stress:     Feeling of Stress :    Social Connections:     Frequency of Communication with Friends and Family:     Frequency of Social Gatherings with Friends and Family:     Attends [employer] Services:     Active Member of Clubs or Organizations:     Attends Club or Organization Meetings:     Marital Status:    Intimate Partner Violence:     Fear of Current or Ex-Partner:     Emotionally Abused:     Physically Abused:     Sexually Abused: ALLERGIES: Penicillins    Review of Systems   Unable to perform ROS: Dementia       Vitals:    05/23/21 0209 05/23/21 0214   BP:  (!) 146/90   Pulse: 77    Resp: 17    Temp: 98.5 °F (36.9 °C)    SpO2: 97%    Weight: 54.9 kg (121 lb)    Height: 5' 5\" (1.651 m)             Physical Exam  Vitals and nursing note reviewed. Constitutional:       General: She is not in acute distress. Appearance: She is well-developed. Comments: elderly-appearing, nad   HENT:      Head: Normocephalic and atraumatic. Cardiovascular:      Rate and Rhythm: Normal rate. Pulmonary:      Effort: Pulmonary effort is normal. No respiratory distress. Breath sounds: Normal breath sounds. Abdominal:      Palpations: Abdomen is soft. Tenderness: There is no abdominal tenderness. Musculoskeletal:         General: No tenderness, deformity or signs of injury. Normal range of motion. Cervical back: Normal range of motion. Comments: Mechanically stable   Skin:     Comments: Two larger skin tears to left elbow region, tiny superficial skin tear to back of right hand   Neurological:      General: No focal deficit present. Mental Status: She is alert. Mental status is at baseline.       Comments: Pt able to get self on and off stretcher, ambulates without difficulty   Psychiatric:         Behavior: Behavior normal.          MDM  Number of Diagnoses or Management Options  Accidental fall, initial encounter  Multiple skin tears  Diagnosis management comments: [de-identified] yo F with PMhx dementia presents with skin tears after accidental fall. No other injuries or complaints. Examination with two large skin tears to left elbow area, not amenable to sutures in ED. Pt also has tiny, superficial abrasion to back of right hand. No other injuries or complaints. No spinal tenderness and mechanically stable throughout. Pt has baseline dementia, but awake and alert, answers basic questions, follows basic commands, is at normal baseline mental status per family. No indication for emergent imaging in ED. Will observe. 2:53 AM  Pt doing well, remains asymptomatic, able to get self on and off stretcher and ambulate in ED. Discussed poc for dc home, wound care, symptom management, follow-up, return precautions. Amount and/or Complexity of Data Reviewed  Obtain history from someone other than the patient: yes  Review and summarize past medical records: yes    Patient Progress  Patient progress: stable         Procedures    PROGRESS NOTES    2:35 AM:   Bernard Aquino MD arrives to the bedside to evaluate the patient. Answered the patient's questions regarding the treatment plan. CONSULTATIONS  None      MEDICATIONS ORDERED  Medications - No data to display    RADIOLOGY INTERPRETATIONS  No orders to display       EKG READINGS/LABORATORY RESULTS  No results found for this or any previous visit (from the past 12 hour(s)). ED DIAGNOSIS & DISPOSITION INFORMATION  Diagnosis:   1. Accidental fall, initial encounter    2.  Multiple skin tears        Disposition: Discharged    Follow-up Information     Follow up With Specialties Details Why Contact Info    Hanny Conley MD Internal Medicine Schedule an appointment as soon as possible for a visit   [address]      [id number] [place] [address] EMERGENCY DEPT Emergency Medicine  As needed Heidy Bellamy [id number]  [phone]          Patient's Medications   Start Taking    No medications on file   Continue Taking    ALBUTEROL (PROVENTIL HFA, VENTOLIN HFA, PROAIR HFA) 90 MCG/ACTUATION INHALER    Take 1 Puff by inhalation every six (6) hours as needed for Wheezing. AMLODIPINE (NORVASC) 5 MG TABLET    Take 1 Tab by mouth daily. ASPIRIN 81 MG CHEWABLE TABLET    Take 1 Tab by mouth daily. ATORVASTATIN (LIPITOR) 80 MG TABLET    Take 1 Tab by mouth daily. CALCIUM CARBONATE (CALCIUM 500 PO)    Take  by mouth daily. EZETIMIBE (ZETIA) 10 MG TABLET    Take 1 Tab by mouth daily. LETROZOLE (FEMARA) 2.5 MG TABLET    Take 2.5 mg by mouth daily. METOPROLOL TARTRATE (LOPRESSOR) 25 MG TABLET    Take 1 Tab by mouth two (2) times a day. TICAGRELOR (BRILINTA) 90 MG TABLET    Take 1 Tab by mouth two (2) times a day. TIOTROPIUM (SPIRIVA WITH HANDIHALER) 18 MCG INHALATION CAPSULE    Take 1 Cap by inhalation daily.    These Medications have changed    No medications on file   Stop Taking    No medications on file           Vik Fletcher MD.

## 2021-05-23 NOTE — ED TRIAGE NOTES
Per family, they heard a loud boom and then checked on the patient and he was walking holding her neck and had noted blood with lacerations coming from her arms. Pt unsure what happened, fall was unwitnessed and pt is unaware if she had an LOC.

## 2021-05-23 NOTE — ED NOTES
Pt. Wounds cleansed by Clint Rings, pt tolerated well and bandages applied, bleeding controlled. MD notified and aware. Will continue to monitor.

## 2021-05-24 ENCOUNTER — PATIENT OUTREACH (OUTPATIENT)
Dept: CASE MANAGEMENT | Age: 81
End: 2021-05-24

## 2021-05-24 NOTE — TELEPHONE ENCOUNTER
Left message    ----- Message from Sancho Yepez sent at 5/24/2021 10:51 AM EDT -----  Patient requesting medication refill. ...... Brendan Cochran

## 2021-05-26 NOTE — TELEPHONE ENCOUNTER
Previous Rx was sent to [address] [place] [address] Visit: 2/15/21 with MD Eliceo Mead  Next Appointment: 5/27/21 with MD Eliceo Mead  Previous Refill Encounter(s): 2/11/21 #180 with 3 refills    Requested Prescriptions     Pending Prescriptions Disp Refills    metoprolol tartrate (LOPRESSOR) 25 mg tablet 180 Tablet 3     Sig: Take 1 Tablet by mouth two (2) times a day.

## 2021-05-27 ENCOUNTER — VIRTUAL VISIT (OUTPATIENT)
Dept: INTERNAL MEDICINE CLINIC | Age: 81
End: 2021-05-27
Payer: MEDICARE

## 2021-05-27 DIAGNOSIS — I25.10 CORONARY ARTERY DISEASE DUE TO CALCIFIED CORONARY LESION: ICD-10-CM

## 2021-05-27 DIAGNOSIS — G30.1 LATE ONSET ALZHEIMER'S DEMENTIA WITH BEHAVIORAL DISTURBANCE (HCC): ICD-10-CM

## 2021-05-27 DIAGNOSIS — F02.818 LATE ONSET ALZHEIMER'S DEMENTIA WITH BEHAVIORAL DISTURBANCE (HCC): ICD-10-CM

## 2021-05-27 DIAGNOSIS — S51.012D SKIN TEAR OF LEFT ELBOW WITHOUT COMPLICATION, SUBSEQUENT ENCOUNTER: Primary | ICD-10-CM

## 2021-05-27 DIAGNOSIS — I25.84 CORONARY ARTERY DISEASE DUE TO CALCIFIED CORONARY LESION: ICD-10-CM

## 2021-05-27 PROBLEM — I21.19 ACUTE INFERIOR MYOCARDIAL INFARCTION (HCC): Status: RESOLVED | Noted: 2021-02-08 | Resolved: 2021-05-27

## 2021-05-27 PROCEDURE — G8420 CALC BMI NORM PARAMETERS: HCPCS | Performed by: INTERNAL MEDICINE

## 2021-05-27 PROCEDURE — 1101F PT FALLS ASSESS-DOCD LE1/YR: CPT | Performed by: INTERNAL MEDICINE

## 2021-05-27 PROCEDURE — G8756 NO BP MEASURE DOC: HCPCS | Performed by: INTERNAL MEDICINE

## 2021-05-27 PROCEDURE — 1090F PRES/ABSN URINE INCON ASSESS: CPT | Performed by: INTERNAL MEDICINE

## 2021-05-27 PROCEDURE — 99214 OFFICE O/P EST MOD 30 MIN: CPT | Performed by: INTERNAL MEDICINE

## 2021-05-27 PROCEDURE — G8536 NO DOC ELDER MAL SCRN: HCPCS | Performed by: INTERNAL MEDICINE

## 2021-05-27 PROCEDURE — G8432 DEP SCR NOT DOC, RNG: HCPCS | Performed by: INTERNAL MEDICINE

## 2021-05-27 PROCEDURE — G0463 HOSPITAL OUTPT CLINIC VISIT: HCPCS | Performed by: INTERNAL MEDICINE

## 2021-05-27 PROCEDURE — G8427 DOCREV CUR MEDS BY ELIG CLIN: HCPCS | Performed by: INTERNAL MEDICINE

## 2021-05-27 NOTE — PROGRESS NOTES
Jessie Espinoza is a [de-identified] y.o. female who was seen by synchronous (real-time) audio-video technology on 5/27/2021 for Wound Check and Dementia        Assessment & Plan:   Diagnoses and all orders for this visit:    1. Skin tear of left elbow without complication, subsequent encounter    2. Coronary artery disease due to calcified coronary lesion    3. Late onset Alzheimer's dementia with behavioral disturbance ([facility] [address]        I spent at least 20 minutes on this visit with this established patient. 712  Subjective:       Objective:     Patient-Reported Vitals 5/25/2021   Patient-Reported Weight 120   Patient-Reported Height 5'0\"   Patient-Reported Systolic  -   Patient-Reported Diastolic -      General: alert, cooperative, no distress   Mental  status: normal mood, behavior, speech, dress, motor activity, and thought processes, able to follow commands   HENT: NCAT   Neck: no visualized mass   Resp: no respiratory distress   Neuro: no gross deficits   Skin: no discoloration or lesions of concern on visible areas   Psychiatric: normal affect, consistent with stated mood, no evidence of hallucinations     Additional exam findings: We discussed the expected course, resolution and complications of the diagnosis(es) in detail. Medication risks, benefits, costs, interactions, and alternatives were discussed as indicated. I advised her to contact the office if her condition worsens, changes or fails to improve as anticipated. She expressed understanding with the diagnosis(es) and plan. Jessie Espinoza, was evaluated through a synchronous (real-time) audio-video encounter. The patient (or guardian if applicable) is aware that this is a billable service. Verbal consent to proceed has been obtained within the past 12 months.  The visit was conducted pursuant to the emergency declaration under the [facility], [address]5 waiver authority and the [employer] Supplemental Appropriations Act. Patient identification was verified, and a caregiver was present when appropriate. The patient was located in a state where the provider was credentialed to provide care. Brett Marquez MD     Here to follow-up after her ER visit on 5/23/2021. She was diagnosed with dementia earlier this year. She apparently had a fall while at home and sustained a skin tear in the left forearm and also the right hand. ER evaluation essentially negative, no stitches necessary, they just doing local care. The daughter-in-law and son are taking good care of her at this time, no signs of infection. A second son has come in from out of town and expressed concern about her significant memory problems. Asking about any homeopathic medications that could be given for the patient. Currently not on prescription meds as below for dementia. We went over how none of the current OTC meds are recommended for treatment.     Past Medical History:   Diagnosis Date    Allergic rhinitis     Breast cancer ([facility] [address] 03/2020    Dr Nikki Lu; ER/CA+, HER-2 neg infiltrating ductal ca 2 sites LEFT breast; Dr Charlotte Gleason adenoma     1/15 Dr Tavo Boyer; polyp 2006 Dr Kim Hurtado COPD     on films    Dementia [facility]) 03/2021    mixed vascular and AD Dr Bri MUNGUIAD (degenerative joint disease)     Dyslipidemia     FHx: heart disease     GERD (gastroesophageal reflux disease)     s/p dilation Dr. Tony Booker 2006; [place] and esophagitis 1/15 Dr Vern Johns H/O cardiac catheterization 02/2021    Dr Doris Tirado [facility]; LAD mild irreg, L Cx clean, RCA heavy calc throughout, occluded midsegment, s/p cutting balloon and angioscult, STEPHANE x3    H/O echocardiogram 02/2021    [facility]; ef 56%, gr 2dd, mild MR    Monoclonal gammopathy 2011 Dr. Stephen Wood Osteoporosis Dr. Erika Rosales, t score -0.6 spine, -2.7 hip     took forteo 2012 to 2014 Dr Herring    Recurrent BCC (basal cell carcinoma)     Dr. Elieser Herbert STEMI (ST elevation myocardial infarction) ([facility] 02/2021    [address]; inf/post MI; cath Dr Murphy Wray showed cutting balloon angioplasty, angiosculpt, STEPHANE x3 heavily calc RCA    Tobacco dependence syndrome      Current Outpatient Medications   Medication Sig    albuterol (PROVENTIL HFA, VENTOLIN HFA, PROAIR HFA) 90 mcg/actuation inhaler Take 1 Puff by inhalation every six (6) hours as needed for Wheezing.  ticagrelor (BRILINTA) 90 mg tablet Take 1 Tab by mouth two (2) times a day.  aspirin 81 mg chewable tablet Take 1 Tab by mouth daily.  metoprolol tartrate (LOPRESSOR) 25 mg tablet Take 1 Tab by mouth two (2) times a day.  letrozole (FEMARA) 2.5 mg tablet Take 2.5 mg by mouth daily.  amLODIPine (NORVASC) 5 mg tablet Take 1 Tab by mouth daily.  atorvastatin (LIPITOR) 80 mg tablet Take 1 Tab by mouth daily.  ezetimibe (Zetia) 10 mg tablet Take 1 Tab by mouth daily.  tiotropium (Spiriva with HandiHaler) 18 mcg inhalation capsule Take 1 Cap by inhalation daily.  CALCIUM CARBONATE (CALCIUM 500 PO) Take  by mouth daily. No current facility-administered medications for this visit. Allergies   Allergen Reactions    Penicillins Rash and Hives     Assessment and plan:  1. Skin tears, healing well. Continue local care  2. Dementia. Follow-up neurology, declined any prescription meds for now. Above conditions discussed at length and patient vocalized understanding.   All questions answered to patient satisfaction

## 2021-05-28 RX ORDER — METOPROLOL TARTRATE 25 MG/1
25 TABLET, FILM COATED ORAL 2 TIMES DAILY
Qty: 180 TABLET | Refills: 3 | Status: SHIPPED | OUTPATIENT
Start: 2021-05-28 | End: 2021-12-31 | Stop reason: SDUPTHER

## 2021-06-06 DIAGNOSIS — J41.0 SIMPLE CHRONIC BRONCHITIS (HCC): ICD-10-CM

## 2021-06-06 DIAGNOSIS — I10 ESSENTIAL HYPERTENSION: ICD-10-CM

## 2021-06-06 DIAGNOSIS — E78.5 DYSLIPIDEMIA: ICD-10-CM

## 2021-06-07 RX ORDER — AMLODIPINE BESYLATE 5 MG/1
5 TABLET ORAL DAILY
Qty: 90 TABLET | Refills: 3 | Status: SHIPPED | OUTPATIENT
Start: 2021-06-07 | End: 2021-12-31 | Stop reason: SDUPTHER

## 2021-06-07 RX ORDER — EZETIMIBE 10 MG/1
10 TABLET ORAL DAILY
Qty: 90 TABLET | Refills: 3 | Status: SHIPPED | OUTPATIENT
Start: 2021-06-07 | End: 2021-12-31 | Stop reason: SDUPTHER

## 2021-06-07 RX ORDER — ATORVASTATIN CALCIUM 80 MG/1
80 TABLET, FILM COATED ORAL DAILY
Qty: 90 TABLET | Refills: 3 | Status: SHIPPED | OUTPATIENT
Start: 2021-06-07 | End: 2021-12-31 | Stop reason: SDUPTHER

## 2021-06-07 NOTE — TELEPHONE ENCOUNTER
PCP: Ricky Zayas MD    Last appt: 3/31/2020  Future Appointments   Date Time Provider Milady Linares   9/27/2021  9:00 AM [facility] NURSE VISIT [facility] RAFIA FLEMING   10/4/2021  1:00 PM Ricky Zayas MD [facility] BS LONNIE       Requested Prescriptions     Pending Prescriptions Disp Refills    amLODIPine (NORVASC) 5 mg tablet 90 Tablet 3     Sig: Take 1 Tablet by mouth daily.  atorvastatin (LIPITOR) 80 mg tablet 90 Tablet 3     Sig: Take 1 Tablet by mouth daily.  ezetimibe (Zetia) 10 mg tablet 90 Tablet 3     Sig: Take 1 Tablet by mouth daily.  tiotropium (Spiriva with HandiHaler) 18 mcg inhalation capsule 90 Capsule 3     Sig: Take 1 Capsule by inhalation daily.

## 2021-07-07 ENCOUNTER — HOSPITAL ENCOUNTER (OUTPATIENT)
Dept: LAB | Age: 81
Discharge: HOME | End: 2021-07-07
Payer: MEDICARE

## 2021-07-07 ENCOUNTER — TRANSCRIBE ORDER (OUTPATIENT)
Dept: REGISTRATION | Age: 81
End: 2021-07-07

## 2021-07-07 DIAGNOSIS — Z01.818 PRE-OP TESTING: ICD-10-CM

## 2021-07-07 DIAGNOSIS — Z01.818 PRE-OP TESTING: Primary | ICD-10-CM

## 2021-07-07 LAB
ANION GAP SERPL CALC-SCNC: 5 MMOL/L (ref 3–18)
ATRIAL RATE: 54 BPM
BUN SERPL-MCNC: 17 MG/DL (ref 7–18)
BUN/CREAT SERPL: 20 (ref 12–20)
CALCIUM SERPL-MCNC: 9.6 MG/DL (ref 8.5–10.1)
CALCULATED P AXIS, ECG09: 0 DEGREES
CALCULATED R AXIS, ECG10: -16 DEGREES
CALCULATED T AXIS, ECG11: -42 DEGREES
CHLORIDE SERPL-SCNC: 103 MMOL/L (ref 100–111)
CO2 SERPL-SCNC: 28 MMOL/L (ref 21–32)
CREAT SERPL-MCNC: 0.86 MG/DL (ref 0.6–1.3)
DIAGNOSIS, 93000: NORMAL
GLUCOSE SERPL-MCNC: 118 MG/DL (ref 74–99)
P-R INTERVAL, ECG05: 112 MS
POTASSIUM SERPL-SCNC: 4.2 MMOL/L (ref 3.5–5.5)
Q-T INTERVAL, ECG07: 446 MS
QRS DURATION, ECG06: 84 MS
QTC CALCULATION (BEZET), ECG08: 422 MS
SODIUM SERPL-SCNC: 136 MMOL/L (ref 136–145)
VENTRICULAR RATE, ECG03: 54 BPM

## 2021-07-07 PROCEDURE — 80048 BASIC METABOLIC PNL TOTAL CA: CPT

## 2021-07-07 PROCEDURE — 93005 ELECTROCARDIOGRAM TRACING: CPT

## 2021-07-07 PROCEDURE — 36415 COLL VENOUS BLD VENIPUNCTURE: CPT

## 2021-07-20 ENCOUNTER — HOSPITAL ENCOUNTER (OUTPATIENT)
Dept: LAB | Age: 81
Discharge: HOME | End: 2021-07-20
Payer: MEDICARE

## 2021-07-20 PROCEDURE — 88305 TISSUE EXAM BY PATHOLOGIST: CPT

## 2021-07-20 PROCEDURE — 88331 PATH CONSLTJ SURG 1 BLK 1SPC: CPT

## 2021-08-03 ENCOUNTER — TELEPHONE (OUTPATIENT)
Dept: INTERNAL MEDICINE CLINIC | Age: 81
End: 2021-08-03

## 2021-08-03 DIAGNOSIS — J41.0 SIMPLE CHRONIC BRONCHITIS (HCC): ICD-10-CM

## 2021-08-03 RX ORDER — ALBUTEROL SULFATE 90 UG/1
1 AEROSOL, METERED RESPIRATORY (INHALATION)
Qty: 1 INHALER | Refills: 5 | Status: CANCELLED | OUTPATIENT
Start: 2021-08-03

## 2021-08-03 NOTE — TELEPHONE ENCOUNTER
Patient's daughter is calling stating she has to call 911 this morning because her Mother had a coughing spell and was unable to catch her breath. He vitals were good. They advised to to schedule an appt with her PCP. I have scheduled her for a VV next Tues. Wants to know if she can be seen sooner.

## 2021-08-03 NOTE — TELEPHONE ENCOUNTER
PCP: Ynes Garcia MD    Last appt: 5/27/2021  Future Appointments   Date Time Provider Milady Linares   8/6/2021  9:20 AM Ynes Garcia MD [facility] BS AMB   9/27/2021  9:00 AM [facility] NURSE VISIT [facility] BS AMB   10/4/2021  1:00 PM Siva Fair MD [facility] BS AMB       Requested Prescriptions     Pending Prescriptions Disp Refills    albuterol (PROVENTIL HFA, VENTOLIN HFA, PROAIR HFA) 90 mcg/actuation inhaler 1 Inhaler 5     Sig: Take 1 Puff by inhalation every six (6) hours as needed for Wheezing.

## 2021-08-06 ENCOUNTER — VIRTUAL VISIT (OUTPATIENT)
Dept: INTERNAL MEDICINE CLINIC | Age: 81
End: 2021-08-06
Payer: MEDICARE

## 2021-08-06 DIAGNOSIS — R06.2 WHEEZING: ICD-10-CM

## 2021-08-06 DIAGNOSIS — J40 BRONCHITIS: Primary | ICD-10-CM

## 2021-08-06 DIAGNOSIS — J44.9 CHRONIC OBSTRUCTIVE PULMONARY DISEASE, UNSPECIFIED COPD TYPE (HCC): ICD-10-CM

## 2021-08-06 PROCEDURE — 1090F PRES/ABSN URINE INCON ASSESS: CPT | Performed by: INTERNAL MEDICINE

## 2021-08-06 PROCEDURE — G8427 DOCREV CUR MEDS BY ELIG CLIN: HCPCS | Performed by: INTERNAL MEDICINE

## 2021-08-06 PROCEDURE — G8432 DEP SCR NOT DOC, RNG: HCPCS | Performed by: INTERNAL MEDICINE

## 2021-08-06 PROCEDURE — G8756 NO BP MEASURE DOC: HCPCS | Performed by: INTERNAL MEDICINE

## 2021-08-06 PROCEDURE — 1101F PT FALLS ASSESS-DOCD LE1/YR: CPT | Performed by: INTERNAL MEDICINE

## 2021-08-06 PROCEDURE — G0463 HOSPITAL OUTPT CLINIC VISIT: HCPCS | Performed by: INTERNAL MEDICINE

## 2021-08-06 PROCEDURE — 99213 OFFICE O/P EST LOW 20 MIN: CPT | Performed by: INTERNAL MEDICINE

## 2021-08-06 RX ORDER — AZITHROMYCIN 250 MG/1
TABLET, FILM COATED ORAL
Qty: 6 TABLET | Refills: 0 | Status: SHIPPED | OUTPATIENT
Start: 2021-08-06 | End: 2021-11-30

## 2021-08-06 RX ORDER — PREDNISONE 20 MG/1
20 TABLET ORAL 2 TIMES DAILY
Qty: 10 TABLET | Refills: 0 | Status: SHIPPED | OUTPATIENT
Start: 2021-08-06 | End: 2021-11-30

## 2021-08-06 RX ORDER — ALBUTEROL SULFATE 90 UG/1
1 AEROSOL, METERED RESPIRATORY (INHALATION)
Qty: 1 INHALER | Refills: 5 | Status: SHIPPED | OUTPATIENT
Start: 2021-08-06 | End: 2021-12-31 | Stop reason: SDUPTHER

## 2021-08-06 NOTE — PROGRESS NOTES
Duke Fenton is a [de-identified] y.o. female who was seen by synchronous (real-time) audio-video technology on 8/6/2021 for Cough and Wheezing        Assessment & Plan:   Diagnoses and all orders for this visit:    1. Bronchitis  -     azithromycin (ZITHROMAX) 250 mg tablet; Take 2 tablets today, then take 1 tablet daily  -     predniSONE (DELTASONE) 20 mg tablet; Take 20 mg by mouth two (2) times a day. 2. Wheezing  -     azithromycin (ZITHROMAX) 250 mg tablet; Take 2 tablets today, then take 1 tablet daily  -     predniSONE (DELTASONE) 20 mg tablet; Take 20 mg by mouth two (2) times a day. 3. Chronic obstructive pulmonary disease, unspecified COPD type ([facility] [address]        I spent at least 12 minutes on this visit with this established patient. 712  Subjective:     Objective:     Patient-Reported Vitals 8/4/2021   Patient-Reported Weight 120   Patient-Reported Height 5'   Patient-Reported Systolic  -   Patient-Reported Diastolic -      General: alert, cooperative, no distress   Mental  status: normal mood, behavior, speech, dress, motor activity, and thought processes, able to follow commands   HENT: NCAT   Neck: no visualized mass   Resp: no respiratory distress   Neuro: no gross deficits   Skin: no discoloration or lesions of concern on visible areas   Psychiatric: normal affect, consistent with stated mood, no evidence of hallucinations     Additional exam findings: We discussed the expected course, resolution and complications of the diagnosis(es) in detail. Medication risks, benefits, costs, interactions, and alternatives were discussed as indicated. I advised her to contact the office if her condition worsens, changes or fails to improve as anticipated. She expressed understanding with the diagnosis(es) and plan. Duke Fenton, was evaluated through a synchronous (real-time) audio-video encounter. The patient (or guardian if applicable) is aware that this is a billable service.  Verbal consent to proceed has been obtained within the past 12 months. The visit was conducted pursuant to the emergency declaration under the [facility] [place]note 2[facility] and the [employer] Act. Patient identification was verified, and a caregiver was present when appropriate. The patient was located in a state where the provider was credentialed to provide care. Paula Apodaca MD    Family member was present during the encounter and was answering most of the questions with the patient's permission    She noted onset of URI symptoms a few days ago with mostly dry cough. It then became more productive and she started feeling congested and 2 days ago, she had a 10-minute coughing fit where she just felt really short of breath. No fevers, no known exposures, she is mostly homebound. Denies any ear pain, sore throat, actual dyspnea outside of the coughing fits. No GI or  complaints, she is Covid vaccinated.   There has been some wheezing and she recently had her albuterol refilled    Past Medical History:   Diagnosis Date    Allergic rhinitis     Breast cancer ([facility] [address] 03/2020    Dr Nolvia Baltazar; ER/GA+, HER-2 neg infiltrating ductal ca 2 sites LEFT breast; Dr Delisa Goodman adenoma     1/15 Dr Jackelin Prabhakar; polyp 2006 Dr Rhianna Parikh COPD     on films    Dementia [facility]) 03/2021    mixed vascular and AD Dr Brad ARMAS (degenerative joint disease)     Dyslipidemia     FHx: heart disease     GERD (gastroesophageal reflux disease)     s/p dilation Dr. Oswald Meyers 2006; [facility] and esophagitis 1/15 Dr David Bullard H/O cardiac catheterization 02/2021    Dr Demarcus Salas [facility]; LAD mild irreg, L Cx clean, RCA heavy calc throughout, occluded midsegment, s/p cutting balloon and angioscult, STEPHANE x3    H/O echocardiogram 02/2021    [facility]; ef 56%, gr 2dd, mild MR    Monoclonal gammopathy 2011 Dr. Kendall Toscano Osteoporosis Dr. Kelsie Habermann, t score -0.6 spine, -2.7 hip     took forteo 2012 to 2014 Dr Herring    Recurrent BCC (basal cell carcinoma)     Dr. Parks Palermo STEMI (ST elevation myocardial infarction) [facility]) 02/2021    [address]; inf/post MI; cath Dr Felicia Wilson showed cutting balloon angioplasty, angiosculpt, STEPHANE x3 heavily calc RCA    Tobacco dependence syndrome      Current Outpatient Medications   Medication Sig    azithromycin (ZITHROMAX) 250 mg tablet Take 2 tablets today, then take 1 tablet daily    predniSONE (DELTASONE) 20 mg tablet Take 20 mg by mouth two (2) times a day.  albuterol (PROVENTIL HFA, VENTOLIN HFA, PROAIR HFA) 90 mcg/actuation inhaler Take 1 Puff by inhalation every six (6) hours as needed for Wheezing.  amLODIPine (NORVASC) 5 mg tablet Take 1 Tablet by mouth daily.  atorvastatin (LIPITOR) 80 mg tablet Take 1 Tablet by mouth daily.  ezetimibe (Zetia) 10 mg tablet Take 1 Tablet by mouth daily.  tiotropium (Spiriva with HandiHaler) 18 mcg inhalation capsule Take 1 Capsule by inhalation daily.  metoprolol tartrate (LOPRESSOR) 25 mg tablet Take 1 Tablet by mouth two (2) times a day.  ticagrelor (BRILINTA) 90 mg tablet Take 1 Tab by mouth two (2) times a day.  aspirin 81 mg chewable tablet Take 1 Tab by mouth daily.  letrozole (FEMARA) 2.5 mg tablet Take 2.5 mg by mouth daily.  CALCIUM CARBONATE (CALCIUM 500 PO) Take  by mouth daily. No current facility-administered medications for this visit. Allergies   Allergen Reactions    Penicillins Rash and Hives     Assessment and plan:  1. Probable bronchitis, possible mild COPD exacerbation. I gave her prednisone, Zithromax and continue albuterol. Call with an update          Above conditions discussed at length and patient vocalized understanding.   All questions answered to patient satisfaction

## 2021-11-29 ENCOUNTER — LAB ONLY (OUTPATIENT)
Dept: INTERNAL MEDICINE CLINIC | Age: 81
End: 2021-11-29

## 2021-11-29 ENCOUNTER — HOSPITAL ENCOUNTER (OUTPATIENT)
Dept: LAB | Age: 81
Discharge: HOME | End: 2021-11-29
Payer: MEDICARE

## 2021-11-29 DIAGNOSIS — I10 ESSENTIAL HYPERTENSION: Primary | ICD-10-CM

## 2021-11-29 DIAGNOSIS — E78.5 DYSLIPIDEMIA: ICD-10-CM

## 2021-11-29 DIAGNOSIS — I10 ESSENTIAL HYPERTENSION: ICD-10-CM

## 2021-11-29 LAB
ALBUMIN SERPL-MCNC: 3.6 G/DL (ref 3.4–5)
ALBUMIN/GLOB SERPL: 1.2 {RATIO} (ref 0.8–1.7)
ALP SERPL-CCNC: 88 U/L (ref 45–117)
ALT SERPL-CCNC: 43 U/L (ref 13–56)
ANION GAP SERPL CALC-SCNC: 6 MMOL/L (ref 3–18)
AST SERPL-CCNC: 26 U/L (ref 10–38)
BILIRUB SERPL-MCNC: 0.4 MG/DL (ref 0.2–1)
BUN SERPL-MCNC: 17 MG/DL (ref 7–18)
BUN/CREAT SERPL: 19 (ref 12–20)
CALCIUM SERPL-MCNC: 9.3 MG/DL (ref 8.5–10.1)
CHLORIDE SERPL-SCNC: 106 MMOL/L (ref 100–111)
CHOLEST SERPL-MCNC: 124 MG/DL
CO2 SERPL-SCNC: 27 MMOL/L (ref 21–32)
CREAT SERPL-MCNC: 0.88 MG/DL (ref 0.6–1.3)
ERYTHROCYTE [DISTWIDTH] IN BLOOD BY AUTOMATED COUNT: 15.4 % (ref 11.6–14.5)
GLOBULIN SER CALC-MCNC: 3 G/DL (ref 2–4)
GLUCOSE SERPL-MCNC: 92 MG/DL (ref 74–99)
HCT VFR BLD AUTO: 41.4 % (ref 35–45)
HDLC SERPL-MCNC: 44 MG/DL (ref 40–60)
HDLC SERPL: 2.8 {RATIO} (ref 0–5)
HGB BLD-MCNC: 13.2 G/DL (ref 12–16)
LDLC SERPL CALC-MCNC: 59.8 MG/DL (ref 0–100)
LIPID PROFILE,FLP: NORMAL
MCH RBC QN AUTO: 29.7 PG (ref 24–34)
MCHC RBC AUTO-ENTMCNC: 31.9 G/DL (ref 31–37)
MCV RBC AUTO: 93.2 FL (ref 78–100)
NRBC # BLD: 0 K/UL (ref 0–0.01)
NRBC BLD-RTO: 0 PER 100 WBC
PLATELET # BLD AUTO: 230 K/UL (ref 135–420)
PMV BLD AUTO: 10.9 FL (ref 9.2–11.8)
POTASSIUM SERPL-SCNC: 4.1 MMOL/L (ref 3.5–5.5)
PROT SERPL-MCNC: 6.6 G/DL (ref 6.4–8.2)
RBC # BLD AUTO: 4.44 M/UL (ref 4.2–5.3)
SODIUM SERPL-SCNC: 139 MMOL/L (ref 136–145)
TRIGL SERPL-MCNC: 101 MG/DL (ref ?–150)
VLDLC SERPL CALC-MCNC: 20.2 MG/DL
WBC # BLD AUTO: 8 K/UL (ref 4.6–13.2)

## 2021-11-29 PROCEDURE — 80053 COMPREHEN METABOLIC PANEL: CPT

## 2021-11-29 PROCEDURE — 85027 COMPLETE CBC AUTOMATED: CPT

## 2021-11-29 PROCEDURE — 80061 LIPID PANEL: CPT

## 2021-11-29 PROCEDURE — 36415 COLL VENOUS BLD VENIPUNCTURE: CPT

## 2021-11-30 ENCOUNTER — OFFICE VISIT (OUTPATIENT)
Dept: INTERNAL MEDICINE CLINIC | Age: 81
End: 2021-11-30
Payer: MEDICARE

## 2021-11-30 VITALS
OXYGEN SATURATION: 99 % | WEIGHT: 125 LBS | BODY MASS INDEX: 20.83 KG/M2 | SYSTOLIC BLOOD PRESSURE: 133 MMHG | HEIGHT: 65 IN | RESPIRATION RATE: 12 BRPM | DIASTOLIC BLOOD PRESSURE: 75 MMHG | HEART RATE: 75 BPM | TEMPERATURE: 97.2 F

## 2021-11-30 DIAGNOSIS — E78.5 DYSLIPIDEMIA: ICD-10-CM

## 2021-11-30 DIAGNOSIS — C50.812 MALIGNANT NEOPLASM OF OVERLAPPING SITES OF LEFT BREAST IN FEMALE, ESTROGEN RECEPTOR POSITIVE (HCC): ICD-10-CM

## 2021-11-30 DIAGNOSIS — Z00.00 MEDICARE ANNUAL WELLNESS VISIT, SUBSEQUENT: Primary | ICD-10-CM

## 2021-11-30 DIAGNOSIS — G30.1 LATE ONSET ALZHEIMER'S DEMENTIA WITH BEHAVIORAL DISTURBANCE (HCC): ICD-10-CM

## 2021-11-30 DIAGNOSIS — K21.9 GASTROESOPHAGEAL REFLUX DISEASE WITHOUT ESOPHAGITIS: ICD-10-CM

## 2021-11-30 DIAGNOSIS — I25.10 CORONARY ARTERY DISEASE DUE TO CALCIFIED CORONARY LESION: ICD-10-CM

## 2021-11-30 DIAGNOSIS — I10 PRIMARY HYPERTENSION: ICD-10-CM

## 2021-11-30 DIAGNOSIS — D47.2 MONOCLONAL GAMMOPATHY: ICD-10-CM

## 2021-11-30 DIAGNOSIS — Z17.0 MALIGNANT NEOPLASM OF OVERLAPPING SITES OF LEFT BREAST IN FEMALE, ESTROGEN RECEPTOR POSITIVE (HCC): ICD-10-CM

## 2021-11-30 DIAGNOSIS — Z71.89 ADVANCED DIRECTIVES, COUNSELING/DISCUSSION: ICD-10-CM

## 2021-11-30 DIAGNOSIS — F02.818 LATE ONSET ALZHEIMER'S DEMENTIA WITH BEHAVIORAL DISTURBANCE (HCC): ICD-10-CM

## 2021-11-30 DIAGNOSIS — I25.84 CORONARY ARTERY DISEASE DUE TO CALCIFIED CORONARY LESION: ICD-10-CM

## 2021-11-30 DIAGNOSIS — J44.9 CHRONIC OBSTRUCTIVE PULMONARY DISEASE, UNSPECIFIED COPD TYPE (HCC): ICD-10-CM

## 2021-11-30 PROCEDURE — G8536 NO DOC ELDER MAL SCRN: HCPCS | Performed by: INTERNAL MEDICINE

## 2021-11-30 PROCEDURE — G8754 DIAS BP LESS 90: HCPCS | Performed by: INTERNAL MEDICINE

## 2021-11-30 PROCEDURE — 1101F PT FALLS ASSESS-DOCD LE1/YR: CPT | Performed by: INTERNAL MEDICINE

## 2021-11-30 PROCEDURE — G8752 SYS BP LESS 140: HCPCS | Performed by: INTERNAL MEDICINE

## 2021-11-30 PROCEDURE — 99497 ADVNCD CARE PLAN 30 MIN: CPT | Performed by: INTERNAL MEDICINE

## 2021-11-30 PROCEDURE — G8510 SCR DEP NEG, NO PLAN REQD: HCPCS | Performed by: INTERNAL MEDICINE

## 2021-11-30 PROCEDURE — G0463 HOSPITAL OUTPT CLINIC VISIT: HCPCS | Performed by: INTERNAL MEDICINE

## 2021-11-30 PROCEDURE — 1090F PRES/ABSN URINE INCON ASSESS: CPT | Performed by: INTERNAL MEDICINE

## 2021-11-30 PROCEDURE — 99214 OFFICE O/P EST MOD 30 MIN: CPT | Performed by: INTERNAL MEDICINE

## 2021-11-30 PROCEDURE — G8427 DOCREV CUR MEDS BY ELIG CLIN: HCPCS | Performed by: INTERNAL MEDICINE

## 2021-11-30 PROCEDURE — G0439 PPPS, SUBSEQ VISIT: HCPCS | Performed by: INTERNAL MEDICINE

## 2021-11-30 PROCEDURE — G8420 CALC BMI NORM PARAMETERS: HCPCS | Performed by: INTERNAL MEDICINE

## 2021-11-30 NOTE — PROGRESS NOTES
[de-identified] y.o. WHITE/NON- female who presents for evaluation. Daughter Shira Hua was present for the encounter    No cardiovascular complaints. She is seeing Dr Richie Sorensen. She can walk the grocery store but no other exercise, just adls    She has not been able to stop smoking but still trying. Using prn alb but fairly consistent w spiriva. We went over possibly using chantix but leery of sfx at this point    The daughter reports that the dementia is progressing slowly. Not on meds at this time    Denies any GI or  complaints     She continues to f/u with Dr Preeti Fenton and Dr Oconnell [address] and now on femara.      LAST MEDICARE WELLNESS EXAM: 4/30/14, 5/1/15, 5/19/16, 5/16/17, 9/26/19, 9/30/20, 11/30/21    Past Medical History:   Diagnosis Date    Allergic rhinitis     BCC (basal cell carcinoma of skin)     Dr. Makayla Otto [facility]) 03/2020    Dr Preeti Fenton; ER/ME+, HER-2 neg infiltrating ductal ca 2 sites LEFT breast; Dr Helm Meaghan adenoma     1/15 Dr Ana Moon; polyp 2006 Dr Miki Mcarthur COPD     on films    Dementia [facility]) 03/2021    mixed vascular and AD Dr Sarai Belle Dyslipidemia     FHx: heart disease     GERD (gastroesophageal reflux disease)     s/p dilation Dr. Lanie Del Valle 2006; [facility] and esophagitis 1/15 Dr Rambo Hinojosa H/O cardiac catheterization 02/2021    Dr Lisa Kay [facility]; LAD mild irreg, L Cx clean, RCA heavy calc throughout, occluded midsegment, s/p cutting balloon and angioscult, STEPHANE x3    H/O echocardiogram 02/2021    [facility]; ef 56%, gr 2dd, mild MR    Monoclonal gammopathy 2011 Dr. Anand Bhakta Osteoporosis Dr. Haleigh Mcfadden, t score -0.6 spine, -2.7 hip     took forteo 2012 to 2014 Dr Haleigh Mcfadden    STEMI (ST elevation myocardial infarction) [facility]) 02/2021    [facility]; inf/post MI; cath Dr Lisa Kay showed cutting balloon angioplasty, angiosculpt, STEPHANE x3 heavily calc RCA    Tobacco dependence syndrome      Past Surgical History:   Procedure Laterality Date    HX BREAST LUMPECTOMY Left 6/30/2020 Left Sentinal Node Biopsy performed by Aimee Cruz MD at [facility] MAIN OR    HX [address] Right 05/2017    and thumb surgery Dr Melody Guidry 2006 polyp; Dr Juan Riddle 1/15 adenoma    HX HYSTERECTOMY  1970s    cervical precancer    HX MASTECTOMY Left 6/30/2020    Partial Mastectomy 2 site localization performed by [facility]imee Cruz MD at [facility] MAIN OR    HX ORTHOPAEDIC      DEXA t score 0.32 spine, -2.28 hip FRAX 5.3 hip Dr Kayley Ash (12/13); spine 0.68, hip -2.51 w FRAX 7.2 (12/15)    NEUROLOGICAL PROCEDURE UNLISTED      s/p c spine surgery [facility] 2010?  NEUROLOGICAL PROCEDURE UNLISTED      MMSE 30/30 (12/17); MMSE 20/30 (9/20)    IA BREAST SURGERY PROCEDURE UNLISTED      neg RIGHT breast biopsy Dr. Angie Hayes; Dr Melissa Mccrary 3/20     Social History     Socioeconomic History    Marital status:      Spouse name: Not on file    Number of children: 3    Years of education: Not on file    Highest education level: Not on file   Occupational History    Occupation: retired [employer]   Tobacco Use    Smoking status: Current Every Day Smoker     Packs/day: 1.00     Years: 50.00     Pack years: 50.00     Types: Cigarettes    Smokeless tobacco: Never Used   Substance and Sexual Activity    Alcohol use: No    Drug use: No    Sexual activity: Not on file   Other Topics Concern    Not on file   Social History Narrative    Not on file     Social Determinants of Health     Financial Resource Strain:     Difficulty of Paying Living Expenses: Not on file   Food Insecurity:     Worried About [address] in the Last Year: Not on file    [address] in the Last Year: Not on file   Transportation Needs:     Lack of Transportation (Medical): Not on file    Lack of Transportation (Non-Medical):  Not on file   Physical Activity:     Days of Exercise per Week: Not on file    Minutes of Exercise per Session: Not on file   Stress:     Feeling of Stress : Not on file   Social Connections:     Frequency of Communication with Friends and Family: Not on file    Frequency of Social Gatherings with Friends and Family: Not on file    Attends Yazidi Services: Not on file    Active Member of Clubs or Organizations: Not on file    Attends Club or Organization Meetings: Not on file    Marital Status: Not on file   Intimate Partner Violence:     Fear of Current or Ex-Partner: Not on file    Emotionally Abused: Not on file    Physically Abused: Not on file    Sexually Abused: Not on file   Housing Stability:     Unable to Pay for Housing in the Last Year: Not on file    Number of [place] in the Last Year: Not on file    Unstable Housing in the Last Year: Not on file     Allergies   Allergen Reactions    Penicillins Rash and Hives       Current Outpatient Medications   Medication Sig    albuterol (PROVENTIL HFA, VENTOLIN HFA, PROAIR HFA) 90 mcg/actuation inhaler Take 1 Puff by inhalation every six (6) hours as needed for Wheezing.  amLODIPine (NORVASC) 5 mg tablet Take 1 Tablet by mouth daily.  atorvastatin (LIPITOR) 80 mg tablet Take 1 Tablet by mouth daily.  ezetimibe (Zetia) 10 mg tablet Take 1 Tablet by mouth daily.  tiotropium (Spiriva with HandiHaler) 18 mcg inhalation capsule Take 1 Capsule by inhalation daily.  metoprolol tartrate (LOPRESSOR) 25 mg tablet Take 1 Tablet by mouth two (2) times a day.  ticagrelor (BRILINTA) 90 mg tablet Take 1 Tab by mouth two (2) times a day.  aspirin 81 mg chewable tablet Take 1 Tab by mouth daily.  CALCIUM CARBONATE (CALCIUM 500 PO) Take  by mouth daily.  letrozole (FEMARA) 2.5 mg tablet Take 2.5 mg by mouth daily. (Patient not taking: Reported on 11/30/2021)     No current facility-administered medications for this visit.      REVIEW OF SYSTEMS:  S/p hyst, mammo 5/21, DEXA 12/15 Dr Alvina Ash, colo 1/15 Dr Marion Shay, sees Dr Adolfo Jacob and Dr Jaky Mark  Ophtho - no vision change or eye pain  Oral - no mouth pain, tongue or tooth problems  Ears - no hearing loss, ear pain, fullness, no swallowing problems  Chest - no breast masses  Resp - no wheezing, chronic coughing, dyspnea  GI - no heartburn, nausea, vomiting, change in bowel habits, bleeding, hemorrhoids  Urinary - no dysuria, hematuria, flank pain, urgency, frequency    Visit Vitals  /75   Pulse 75   Temp 97.2 °F (36.2 °C) (Temporal)   Resp 12   Ht 5' 5\" (1.651 m)   Wt 125 lb (56.7 kg)   SpO2 99%   BMI 20.80 kg/m²   A&O x3  Affect is appropriate. Mood stable  No apparent distress  Anicteric, no JVD, adenopathy or thyromegaly. No carotid bruits or radiated murmur  Lungs clear to auscultation, no wheezes or rales  Heart showed regular rate and rhythm. No murmur, rubs, gallops  Abdomen soft nontender, no hepatosplenomegaly or masses. Extremities without edema.   Pulses 1-2+ symmetrically    LABS  From 5/11 showed   gluc 90,   cr 0.70,    alt 21,          chol 149, tg 100, hdl 29, ldl-c 100, wbc 9.8, hb 14.2, plt 276  From 10/11 showed gluc 100, cr 1.04, gfr 55  From 11/11 showed                   ldl-p 1685, chol 157, tg 116, hdl 41, ldl-c 93,   spep+  From 3/12 showed   gluc 91,   cr 0.84, gfr 70,  alt 17, ldl-p 1309, chol 126, tg 86,   hdl 37, ldl-c 72  From 8/12 showed   gluc 95,   cr 0.86, gfr 68,  alt 21  From 3/13 showed                   ldl-p 1281, chol 118, tg 93,   hdl 40, ldl-c 59  From 9/13 showed   gluc 92,   cr 0.82, gfr 72,  alt 19, ldl-p 1268, chol 136, tg 71,   hdl 42, ldl-c 80,   wbc 7.7, hb 13.4, plt 310, ua neg pro  From 3/14 showed   gluc 92,   cr 0.88, gfr>60, alt<5,          chol 122, tg 104, hdl 35, ldl-c 66,   wbc 7.5, hb 12.7, plt 312, ua neg  From 10/14 showed               chol 140, tg 100, hdl 36, ldl-c 84  From 4/15 showed   gluc 94,   cr 0.96, gfr>60, alt 6,          wbc 9.3, hb 14.0, plt 313  From 11/15 showed               chol 144, tg 79,   hdl 44, ldl-c 84  From 5/16 showed   gluc 82,   cr 0.82, gfr>60, alt 26,          wbc 5.3, hb 14.6, plt 241  From 11/16 showed               chol 217, tg 165, hdl 47, ldl-c 137  From 5/17 showed   gluc 90,   cr 0.75, gfr>60, alt 29,          wbc 7.4, hb 14.7, plt 224  From 6/17 showed   gluc 86,   cr 0.86, gfr>60,           wbc 8.4, hb 15.4, plt 235  From 9/17 showed   gluc 80,   cr 0.81, gfr 71,  alt 18,          wbc 8.4, hb 15.1, plt 267, fe 113, %sat 37, b12 959, FERNY+ igg mono lambda light chain, vit d 41.5  From 12/17 showed               chol 109, tg 73,   hdl 49, ldl-c 45  From 8/18 showed   gluc 89,   cr 0.78, gfr>60,           chol 120, tg 80,   hdl 48, ldl-c 56  From 4/19 showed                  wbc 8.8, hb 14.5, plt 259  From 9/19 showed   gluc 85,   cr 0.81, gfr>60, alt 23,          chol 136, tg 92,   hdl 46, ldl-c 71  From 3/20 showed               wbc 7.6, hb 14.8, plt 266  From 6/20 showed   gluc 90,   cr 0.92, gfr 59,  alt 24,          chol 164, tg 91,   hdl 48, ldl-c 98  From 9/20 showed   gluc 89,   cr 0.94, gfr 57,  alt 31,          wbc 7.0, hb 14.4, plt 240, tsh 1.82, ft4 1.10, b12 297, fol>20  From 2/21 showed   gluc 95,   cr 0.90, gfr>60,           chol 108, tg 44,   hdl 61, ldl-c 52,   wb 11.1, hb 12.3, plt 236, trop 68    Results for orders placed or performed during the hospital encounter of 67/67/43   METABOLIC PANEL, COMPREHENSIVE   Result Value Ref Range    Sodium 139 136 - 145 mmol/L    Potassium 4.1 3.5 - 5.5 mmol/L    Chloride 106 100 - 111 mmol/L    CO2 27 21 - 32 mmol/L    Anion gap 6 3.0 - 18 mmol/L    Glucose 92 74 - 99 mg/dL    BUN 17 7.0 - 18 MG/DL    Creatinine 0.88 0.6 - 1.3 MG/DL    BUN/Creatinine ratio 19 12 - 20      GFR est AA >60 >60 ml/min/1.73m2    GFR est non-AA >60 >60 ml/min/1.73m2    Calcium 9.3 8.5 - 10.1 MG/DL    Bilirubin, total 0.4 0.2 - 1.0 MG/DL    ALT (SGPT) 43 13 - 56 U/L    AST (SGOT) 26 10 - 38 U/L    Alk.  phosphatase 88 45 - 117 U/L    Protein, total 6.6 6.4 - 8.2 g/dL    Albumin 3.6 3.4 - 5.0 g/dL    Globulin 3.0 2.0 - 4.0 g/dL    A-G Ratio 1.2 0.8 - 1.7     CBC W/O DIFF   Result Value Ref Range    WBC 8.0 4.6 - 13.2 K/uL    RBC 4.44 4.20 - 5.30 M/uL    HGB 13.2 12.0 - 16.0 g/dL    HCT 41.4 35.0 - 45.0 %    MCV 93.2 78.0 - 100.0 FL    MCH 29.7 24.0 - 34.0 PG    MCHC 31.9 31.0 - 37.0 g/dL    RDW 15.4 (H) 11.6 - 14.5 %    PLATELET 601 415 - 030 K/uL    MPV 10.9 9.2 - 11.8 FL    NRBC 0.0 0  WBC    ABSOLUTE NRBC 0.00 0.00 - 0.01 K/uL   LIPID PANEL   Result Value Ref Range    LIPID PROFILE          Cholesterol, total 124 <200 MG/DL    Triglyceride 101 <150 MG/DL    HDL Cholesterol 44 40 - 60 MG/DL    LDL, calculated 59.8 0 - 100 MG/DL    VLDL, calculated 20.2 MG/DL    CHOL/HDL Ratio 2.8 0 - 5.0       We reviewed the patient's labs from the last several visits to point out trends in the numbers          Patient Active Problem List   Diagnosis Code    Colon polyps Dr. Maximino Caldera 2006 K63.5    Osteoporosis Dr. Sydney Niño M81.0    Monoclonal gammopathy 2011 Dr. Corey Buckley D47.2    COPD on films J44.9    Dyslipidemia E78.5    Arthritis, degenerative M19.90    Gastroesophageal reflux disease without esophagitis K21.9    Advance directive discussed with patient Z71.89    Tobacco dependence syndrome F17.200    Primary hypertension I10    Cancer of overlapping sites of left female breast ([facility] C50.812    CHD (coronary heart disease) I25.10    Late onset Alzheimer's dementia with behavioral disturbance (HCC) G30.1, F02.81     ASSESSMENT AND PLAN:  1. Dyslipidemia. At target on lipitor and zetia  2. GERD. ppi prn and avoidance measures  3. DJD. Prn meds  4. Colon polyps. Fiber  5. COPD. Continue inhalers  6. Osteoporosis. F/U Dr. Sydney Niño as needed  7. MGUS. F/U Dr. Jourdan Camejo  8. Allergies. F/U Dr. Tomás Winston, flonase cont  9. Smoking cessation again reiterated  10. HTN. Controlled on current  11. Breast ca. Per Dr Wes Marion and Dr Jourdan Camejo  12. CHD. Continue current regimen and f/u Dr Twan Ku as scheduled  13. Dementia. Off meds at this point      RTC 5/22    Above conditions discussed at length and patient vocalized understanding. All questions answered to patient satisfaction          ICD-10-CM ICD-9-CM    1. Primary hypertension  I10 401.9    2. Coronary artery disease due to calcified coronary lesion  I25.10 414.00     I25.84 414.4    3. Gastroesophageal reflux disease without esophagitis  K21.9 530.81    4. Late onset Alzheimer's dementia with behavioral disturbance (HCC)  G30.1 331.0     F02.81 294.11    5. Chronic obstructive pulmonary disease, unspecified COPD type ([facility]  J44.9 496    6. Malignant neoplasm of overlapping sites of left breast in female, estrogen receptor positive ([facility]  C50.812 174.8     Z17.0 V86.0    7. Dyslipidemia  E78.5 272.4    8.  Monoclonal gammopathy 2011 Dr. Radha Newell  D47.2 273.1

## 2021-11-30 NOTE — PROGRESS NOTES
Nyasia Bourne presents with   Chief Complaint   Patient presents with   [facility] Annual Wellness Visit    Cholesterol Problem    Hypertension    Labs     11-29-21     1. \"Have you been to the ER, urgent care clinic since your last visit? Hospitalized since your last visit? \" Abhi Sameer 9-14-21 for Chest pain/SOB    2. \"Have you seen or consulted any other health care providers outside of the [address] [place] [address] since your last visit? \" YES, see above    3. For patients aged 39-70: Has the patient had a colonoscopy? NA based on age or sex     If the patient is female:    3. For patients aged 41-77: Has the patient had a mammogram within the past 2 years? NA based on age or sex    11. For patients aged 21-65: Has the patient had a pap smear?  NA based on age or sex 06-Aug-2020

## 2021-12-01 NOTE — PROGRESS NOTES
This is a Subsequent Medicare Annual Wellness Visit     I have reviewed the patient's medical history in detail and updated the computerized patient record. Assessment/Plan   Education and counseling provided:  Are appropriate based on today's review and evaluation  End-of-Life planning (with patient's consent)  Influenza Vaccine  Screening Mammography  Cardiovascular screening blood test  Diabetes screening test    1. Medicare annual wellness visit, subsequent  2. Primary hypertension  3. Coronary artery disease due to calcified coronary lesion  4. Gastroesophageal reflux disease without esophagitis  5. Late onset Alzheimer's dementia with behavioral disturbance ([facility]  6. Chronic obstructive pulmonary disease, unspecified COPD type ([facility]  7. Malignant neoplasm of overlapping sites of left breast in female, estrogen receptor positive ([facility]  8. Dyslipidemia  9. Monoclonal gammopathy 2011 Dr. Rojas Score  10. Advanced directives, counseling/discussion  -     ADVANCE CARE PLANNING FIRST 30 MINS       Depression Risk Factor Screening     3 most recent PHQ Screens 11/30/2021   Little interest or pleasure in doing things Not at all   Feeling down, depressed, irritable, or hopeless Not at all   Total Score PHQ 2 0       Alcohol Risk Screen   Do you average more than 1 drink per night or more than 7 drinks a week?: (P) No  On any one occasion in the past three months have you had more than 3 drinks containing alcohol?: (P) No          Functional Ability and Level of Safety   Hearing:  Hearing: (P) additional comments below  Hearing comments: (P) I need hearing aids      Activities of Daily Living: The home contains: (P) no safety equipment  Functional ADLs: (P) Patient does total self care     Ambulation:  Patient ambulates: (P) with no difficulty     Fall Risk:  Fall Risk Assessment, last 12 mths 11/30/2021   Able to walk? Yes   Fall in past 12 months? 0   Do you feel unsteady?  0   Are you worried about falling 0 Abuse Screen:  Do you ever feel afraid of your partner?: No  Are you in a relationship with someone who physically or mentally threatens you?: No  Is it safe for you to go home?: Yes        Cognitive Screening   Has your family/caregiver stated any concerns about your memory?: (P) No  pt with dementia    Health Maintenance Due     Health Maintenance Due   Topic Date Due    Low dose CT lung screening  Never done    DTaP/Tdap/Td series (2 - Td or Tdap) 05/25/2021    Flu Vaccine (1) 09/01/2021    COVID-19 Vaccine (3 - Booster for Pfizer series) 09/17/2021       Patient Care Team   Patient Care Team:  Idalia Corbin MD as PCP - General (Internal Medicine)  Idalia Corbin MD as PCP - [address] [place] [address] Dr Darling Provider  Sriram Nice MD (Breast Surgery)  Samy Ghosh as Nurse Navigator (Oncology)  Jerad Christensen NP (Neurology)  Felipe Miller, PHD as Consulting Provider (Psychology)    History     Patient Active Problem List   Diagnosis Code    Colon polyps Dr. Katt Cavazos 2006 K63.5    Osteoporosis Dr. Froylan Chery M81.0    Monoclonal gammopathy 2011 Dr. Castro Holding D47.2    COPD on films J44.9    Dyslipidemia E78.5    Arthritis, degenerative M19.90    Gastroesophageal reflux disease without esophagitis K21.9    Advance directive discussed with patient Z71.89    Tobacco dependence syndrome F17.200    Primary hypertension I10    Cancer of overlapping sites of left female breast ([facility] C50.812    CHD (coronary heart disease) I25.10    Late onset Alzheimer's dementia with behavioral disturbance ([facility] G30.1, F02.81     Past Medical History:   Diagnosis Date    Allergic rhinitis     BCC (basal cell carcinoma of skin)     Dr. Annamarie Huston [facility]) 03/2020    Dr Rodriguez Michelle; ER/VA+, HER-2 neg infiltrating ductal ca 2 sites LEFT breast; Dr Chahal Beat adenoma     1/15 Dr Pedro Crane; polyp 2006 Dr Shoaib Lang COPD     on films    Dementia [facility]) 03/2021    mixed vascular and AD Dr Merlin Golden  Dyslipidemia     FHx: heart disease     GERD (gastroesophageal reflux disease)     s/p dilation Dr. Iliana Fenton 2006; [facility] and esophagitis 1/15 Dr Aditi Gonzales H/O cardiac catheterization 02/2021    Dr Nadir Powell [facility]; LAD mild irreg, L Cx clean, RCA heavy calc throughout, occluded midsegment, s/p cutting balloon and angioscult, STEPHANE x3    H/O echocardiogram 02/2021    [facility]; ef 56%, gr 2dd, mild MR    Monoclonal gammopathy 2011 Dr. Gudelia Rogel Osteoporosis Dr. Russell Pierson, t score -0.6 spine, -2.7 hip     took forteo 2012 to 2014 Dr Russell Pierson    STEMI (ST elevation myocardial infarction) [facility]) 02/2021    [facility]; inf/post MI; cath Dr Nadir Powell showed cutting balloon angioplasty, angiosculpt, STEPHANE x3 heavily calc RCA    Tobacco dependence syndrome       Past Surgical History:   Procedure Laterality Date    HX BREAST LUMPECTOMY Left 6/30/2020    Left Sentinal Node Biopsy performed by Kristen Weaver MD at [facility] MAIN OR    [address] Right 05/2017    and thumb surgery Dr Leon Fenton 2006 polyp; Dr Rodriguez Esters 1/15 adenoma    HX HYSTERECTOMY  1970s    cervical precancer    HX MASTECTOMY Left 6/30/2020    Partial Mastectomy 2 site localization performed by Kristen Weaver MD at [facility] MAIN OR    HX ORTHOPAEDIC      DEXA t score 0.32 spine, -2.28 hip FRAX 5.3 hip Dr Russell Pierson (12/13); spine 0.68, hip -2.51 w FRAX 7.2 (12/15)    NEUROLOGICAL PROCEDURE UNLISTED      s/p c spine surgery [facility] 2010?  NEUROLOGICAL PROCEDURE UNLISTED      MMSE 30/30 (12/17); MMSE 20/30 (9/20)    [place] BREAST SURGERY PROCEDURE UNLISTED      neg RIGHT breast biopsy Dr. France Born; Dr Judy Souza LEFT 3/20     Current Outpatient Medications   Medication Sig Dispense Refill    albuterol (PROVENTIL HFA, VENTOLIN HFA, PROAIR HFA) 90 mcg/actuation inhaler Take 1 Puff by inhalation every six (6) hours as needed for Wheezing.  1 Inhaler 5    amLODIPine (NORVASC) 5 mg tablet Take 1 Tablet by mouth daily. 90 Tablet 3    atorvastatin (LIPITOR) 80 mg tablet Take 1 Tablet by mouth daily. 90 Tablet 3    ezetimibe (Zetia) 10 mg tablet Take 1 Tablet by mouth daily. 90 Tablet 3    tiotropium (Spiriva with HandiHaler) 18 mcg inhalation capsule Take 1 Capsule by inhalation daily. 90 Capsule 3    metoprolol tartrate (LOPRESSOR) 25 mg tablet Take 1 Tablet by mouth two (2) times a day. 180 Tablet 3    ticagrelor (BRILINTA) 90 mg tablet Take 1 Tab by mouth two (2) times a day. 180 Tab 3    aspirin 81 mg chewable tablet Take 1 Tab by mouth daily. 180 Tab 1    CALCIUM CARBONATE (CALCIUM 500 PO) Take  by mouth daily.  letrozole (FEMARA) 2.5 mg tablet Take 2.5 mg by mouth daily.  (Patient not taking: Reported on 11/30/2021)       Allergies   Allergen Reactions    Penicillins Rash and Hives       Family History   Problem Relation Age of Onset    Heart Attack Father     Kidney Disease Mother         ADPKD    Diabetes Sister     Cancer Sister         leukemia     Social History     Tobacco Use    Smoking status: Current Every Day Smoker     Packs/day: 1.00     Years: 50.00     Pack years: 50.00     Types: Cigarettes    Smokeless tobacco: Never Used   Substance Use Topics    Alcohol use: No         Baylee Hansen MD

## 2021-12-01 NOTE — ACP (ADVANCE CARE PLANNING)
Advance Care Planning     Advance Care Planning (ACP) Physician/NP/PA Conversation      Date of Conversation: 11/30/2021  Conducted with: Healthcare Decision Maker: Next of Kin by law (only applies in absence of a Healthcare Power of  or Legal Guardian)    Healthcare Decision Maker:     Primary Decision Maker: Bereket Cool - [phone]  Click here to [address] including selection of the Healthcare Decision Maker Relationship (ie \"Primary\")        Today we documented Decision Maker(s) consistent with Legal Next of Kin hierarchy. Care Preferences:    Hospitalization: \"If your health worsens and it becomes clear that your chance of recovery is unlikely, what would be your preference regarding hospitalization? \"  The patient would prefer comfort-focused treatment without hospitalization. Ventilation: \"If you were unable to breathe on your own and your chance of recovery was unlikely, what would be your preference about the use of a ventilator (breathing machine) if it was available to you? \"   The patient would NOT desire the use of a ventilator. Resuscitation: \"In the event your heart stopped as a result of an underlying serious health condition, would you want attempts to be made to restart your heart, or would you prefer a natural death? \"   No, do NOT attempt to resuscitate.     Additional topics discussed: ventilation preferences, hospitalization preferences and resuscitation preferences    Conversation Outcomes / Follow-Up Plan:   ACP in process - information provided, considering goals and options  Reviewed DNR/DNI and patient elects DNR order - referred to ACP Clinical Specialist & placed order     Length of Voluntary ACP Conversation in minutes:  16 minutes    Valeria Kessler MD

## 2021-12-01 NOTE — PATIENT INSTRUCTIONS
Medicare Wellness Visit, Female     The best way to live healthy is to have a lifestyle where you eat a well-balanced diet, exercise regularly, limit alcohol use, and quit all forms of tobacco/nicotine, if applicable. Regular preventive services are another way to keep healthy. Preventive services (vaccines, screening tests, monitoring & exams) can help personalize your care plan, which helps you manage your own care. Screening tests can find health problems at the earliest stages, when they are easiest to treat. [facility] follows the current, evidence-based guidelines published by the [facility] Jayme Liang ([facility]) when recommending preventive services for our patients. Because we follow these guidelines, sometimes recommendations change over time as research supports it. (For example, mammograms used to be recommended annually. Even though Medicare will still pay for an annual mammogram, the newer guidelines recommend a mammogram every two years for women of average risk). Of course, you and your doctor may decide to screen more often for some diseases, based on your risk and your co-morbidities (chronic disease you are already diagnosed with). Preventive services for you include:  - Medicare offers their members a free annual wellness visit, which is time for you and your primary care provider to discuss and plan for your preventive service needs. Take advantage of this benefit every year!  -All adults over the age of 72 should receive the recommended pneumonia vaccines. Current USPSTF guidelines recommend a series of two vaccines for the best pneumonia protection.   -All adults should have a flu vaccine yearly and a tetanus vaccine every 10 years.   -All adults age 48 and older should receive the shingles vaccines (series of two vaccines).       -All adults age 38-68 who are overweight should have a diabetes screening test once every three years.   -All adults born between [birth year] and 1964 should be screened once for Hepatitis C.  -Other screening tests and preventive services for persons with diabetes include: an eye exam to screen for diabetic retinopathy, a kidney function test, a foot exam, and stricter control over your cholesterol.   -Cardiovascular screening for adults with routine risk involves an electrocardiogram (ECG) at intervals determined by your doctor.   -Colorectal cancer screenings should be done for adults age 54-65 with no increased risk factors for colorectal cancer. There are a number of acceptable methods of screening for this type of cancer. Each test has its own benefits and drawbacks. Discuss with your doctor what is most appropriate for you during your annual wellness visit. The different tests include: colonoscopy (considered the best screening method), a fecal occult blood test, a fecal DNA test, and sigmoidoscopy.    -A bone mass density test is recommended when a woman turns 65 to screen for osteoporosis. This test is only recommended one time, as a screening. Some providers will use this same test as a disease monitoring tool if you already have osteoporosis. -Breast cancer screenings are recommended every other year for women of normal risk, age 54-69.  -Cervical cancer screenings for women over age 72 are only recommended with certain risk factors.      Here is a list of your current Health Maintenance items (your personalized list of preventive services) with a due date:  Health Maintenance Due   Topic Date Due    Smoker or Former Smoker - [address]  Never done    DTaP/Tdap/Td  (2 - Td or Tdap) 05/25/2021    Yearly Flu Vaccine (1) 09/01/2021    COVID-19 Vaccine (3 - Booster for Pfizer series) 09/17/2021

## 2021-12-31 DIAGNOSIS — I10 ESSENTIAL HYPERTENSION: ICD-10-CM

## 2021-12-31 DIAGNOSIS — J41.0 SIMPLE CHRONIC BRONCHITIS (HCC): ICD-10-CM

## 2021-12-31 DIAGNOSIS — E78.5 DYSLIPIDEMIA: ICD-10-CM

## 2022-01-03 RX ORDER — EZETIMIBE 10 MG/1
10 TABLET ORAL DAILY
Qty: 90 TABLET | Refills: 3 | Status: SHIPPED | OUTPATIENT
Start: 2022-01-03

## 2022-01-03 RX ORDER — ATORVASTATIN CALCIUM 80 MG/1
80 TABLET, FILM COATED ORAL DAILY
Qty: 90 TABLET | Refills: 3 | Status: SHIPPED | OUTPATIENT
Start: 2022-01-03

## 2022-01-03 RX ORDER — METOPROLOL TARTRATE 25 MG/1
25 TABLET, FILM COATED ORAL 2 TIMES DAILY
Qty: 180 TABLET | Refills: 3 | Status: SHIPPED | OUTPATIENT
Start: 2022-01-03

## 2022-01-03 RX ORDER — ALBUTEROL SULFATE 90 UG/1
1 AEROSOL, METERED RESPIRATORY (INHALATION)
Qty: 1 EACH | Refills: 11 | Status: SHIPPED | OUTPATIENT
Start: 2022-01-03

## 2022-01-03 RX ORDER — AMLODIPINE BESYLATE 5 MG/1
5 TABLET ORAL DAILY
Qty: 90 TABLET | Refills: 3 | Status: SHIPPED | OUTPATIENT
Start: 2022-01-03

## 2022-01-03 NOTE — TELEPHONE ENCOUNTER
PCP: Carl Santana MD    Last appt: 11/30/2021  No future appointments. Requested Prescriptions     Pending Prescriptions Disp Refills    metoprolol tartrate (LOPRESSOR) 25 mg tablet 180 Tablet 3     Sig: Take 1 Tablet by mouth two (2) times a day.  amLODIPine (NORVASC) 5 mg tablet 90 Tablet 3     Sig: Take 1 Tablet by mouth daily.  atorvastatin (LIPITOR) 80 mg tablet 90 Tablet 3     Sig: Take 1 Tablet by mouth daily.  ezetimibe (Zetia) 10 mg tablet 90 Tablet 3     Sig: Take 1 Tablet by mouth daily.  tiotropium (Spiriva with HandiHaler) 18 mcg inhalation capsule 90 Capsule 3     Sig: Take 1 Capsule by inhalation daily.  albuterol (PROVENTIL HFA, VENTOLIN HFA, PROAIR HFA) 90 mcg/actuation inhaler       Sig: Take 1 Puff by inhalation every six (6) hours as needed for Wheezing.

## 2022-01-12 ENCOUNTER — TELEPHONE (OUTPATIENT)
Dept: INTERNAL MEDICINE CLINIC | Age: 82
End: 2022-01-12

## 2022-01-12 NOTE — TELEPHONE ENCOUNTER
Isaura, daughter, called and said Dr KERR was going to mail mother a handicap parking placard form and they haven't received anything yet. Caroline Malhotra they discussed it at her last visit on 11/30/21. Please mail to the home address when ready.

## 2022-03-11 ENCOUNTER — TELEPHONE (OUTPATIENT)
Dept: INTERNAL MEDICINE CLINIC | Age: 82
End: 2022-03-11

## 2022-03-11 NOTE — TELEPHONE ENCOUNTER
Daughter Oj Doyle called and wants to  copy of last note from Nov.  Printed and placed up front for her (confirmed she is on [facility] to Release form).

## 2022-03-19 PROBLEM — C50.812 CANCER OF OVERLAPPING SITES OF LEFT FEMALE BREAST (HCC): Status: ACTIVE | Noted: 2020-03-23

## 2022-03-19 PROBLEM — I25.10 CHD (CORONARY HEART DISEASE): Status: ACTIVE | Noted: 2021-02-15

## 2022-03-19 PROBLEM — F02.818 LATE ONSET ALZHEIMER'S DEMENTIA WITH BEHAVIORAL DISTURBANCE (HCC): Status: ACTIVE | Noted: 2021-05-27

## 2022-03-19 PROBLEM — G30.1 LATE ONSET ALZHEIMER'S DEMENTIA WITH BEHAVIORAL DISTURBANCE (HCC): Status: ACTIVE | Noted: 2021-05-27

## 2022-03-20 PROBLEM — I10 PRIMARY HYPERTENSION: Status: ACTIVE | Noted: 2018-04-24

## 2022-05-02 DIAGNOSIS — E78.5 DYSLIPIDEMIA: ICD-10-CM

## 2022-05-02 DIAGNOSIS — J41.0 SIMPLE CHRONIC BRONCHITIS (HCC): ICD-10-CM

## 2022-05-02 DIAGNOSIS — I10 ESSENTIAL HYPERTENSION: ICD-10-CM

## 2022-05-03 RX ORDER — AMLODIPINE BESYLATE 5 MG/1
5 TABLET ORAL DAILY
Qty: 90 TABLET | Refills: 3 | OUTPATIENT
Start: 2022-05-03

## 2022-05-03 RX ORDER — ATORVASTATIN CALCIUM 80 MG/1
80 TABLET, FILM COATED ORAL DAILY
Qty: 90 TABLET | Refills: 3 | OUTPATIENT
Start: 2022-05-03

## 2022-05-03 RX ORDER — ALBUTEROL SULFATE 90 UG/1
1 AEROSOL, METERED RESPIRATORY (INHALATION)
Qty: 1 EACH | Refills: 11 | OUTPATIENT
Start: 2022-05-03

## 2022-05-03 RX ORDER — EZETIMIBE 10 MG/1
10 TABLET ORAL DAILY
Qty: 90 TABLET | Refills: 3 | OUTPATIENT
Start: 2022-05-03

## 2022-05-03 RX ORDER — METOPROLOL TARTRATE 25 MG/1
25 TABLET, FILM COATED ORAL 2 TIMES DAILY
Qty: 180 TABLET | Refills: 3 | OUTPATIENT
Start: 2022-05-03

## 2022-05-19 ENCOUNTER — HOSPITAL ENCOUNTER (OUTPATIENT)
Dept: GENERAL RADIOLOGY | Age: 82
Discharge: HOME | End: 2022-05-19
Payer: MEDICARE

## 2022-05-19 ENCOUNTER — HOSPITAL ENCOUNTER (OUTPATIENT)
Dept: LAB | Age: 82
Discharge: HOME | End: 2022-05-19
Payer: MEDICARE

## 2022-05-19 ENCOUNTER — OFFICE VISIT (OUTPATIENT)
Dept: INTERNAL MEDICINE CLINIC | Age: 82
End: 2022-05-19
Payer: MEDICARE

## 2022-05-19 VITALS
DIASTOLIC BLOOD PRESSURE: 70 MMHG | BODY MASS INDEX: 21.16 KG/M2 | WEIGHT: 127 LBS | HEIGHT: 65 IN | SYSTOLIC BLOOD PRESSURE: 104 MMHG | HEART RATE: 98 BPM | OXYGEN SATURATION: 98 %

## 2022-05-19 DIAGNOSIS — N30.00 ACUTE CYSTITIS WITHOUT HEMATURIA: ICD-10-CM

## 2022-05-19 DIAGNOSIS — E87.70 HYPERVOLEMIA, UNSPECIFIED HYPERVOLEMIA TYPE: ICD-10-CM

## 2022-05-19 DIAGNOSIS — N30.00 ACUTE CYSTITIS WITHOUT HEMATURIA: Primary | ICD-10-CM

## 2022-05-19 DIAGNOSIS — J44.9 CHRONIC OBSTRUCTIVE PULMONARY DISEASE, UNSPECIFIED COPD TYPE (HCC): ICD-10-CM

## 2022-05-19 DIAGNOSIS — F02.818 LATE ONSET ALZHEIMER'S DEMENTIA WITH BEHAVIORAL DISTURBANCE (HCC): ICD-10-CM

## 2022-05-19 DIAGNOSIS — G30.1 LATE ONSET ALZHEIMER'S DEMENTIA WITH BEHAVIORAL DISTURBANCE (HCC): ICD-10-CM

## 2022-05-19 DIAGNOSIS — I10 PRIMARY HYPERTENSION: ICD-10-CM

## 2022-05-19 LAB
ALBUMIN SERPL-MCNC: 2.8 G/DL (ref 3.4–5)
ALBUMIN/GLOB SERPL: 0.8 {RATIO} (ref 0.8–1.7)
ALP SERPL-CCNC: 158 U/L (ref 45–117)
ALT SERPL-CCNC: 47 U/L (ref 13–56)
ANION GAP SERPL CALC-SCNC: 10 MMOL/L (ref 3–18)
AST SERPL-CCNC: 53 U/L (ref 10–38)
BASOPHILS # BLD: 0.1 K/UL (ref 0–0.1)
BASOPHILS NFR BLD: 0 % (ref 0–2)
BILIRUB SERPL-MCNC: 1.2 MG/DL (ref 0.2–1)
BNP SERPL-MCNC: 2863 PG/ML (ref 0–1800)
BUN SERPL-MCNC: 37 MG/DL (ref 7–18)
BUN/CREAT SERPL: 26 (ref 12–20)
CALCIUM SERPL-MCNC: 8.8 MG/DL (ref 8.5–10.1)
CHLORIDE SERPL-SCNC: 97 MMOL/L (ref 100–111)
CO2 SERPL-SCNC: 22 MMOL/L (ref 21–32)
CREAT SERPL-MCNC: 1.45 MG/DL (ref 0.6–1.3)
DIFFERENTIAL METHOD BLD: ABNORMAL
EOSINOPHIL # BLD: 0 K/UL (ref 0–0.4)
EOSINOPHIL NFR BLD: 0 % (ref 0–5)
ERYTHROCYTE [DISTWIDTH] IN BLOOD BY AUTOMATED COUNT: 15.6 % (ref 11.6–14.5)
GLOBULIN SER CALC-MCNC: 3.5 G/DL (ref 2–4)
GLUCOSE SERPL-MCNC: 103 MG/DL (ref 74–99)
HCT VFR BLD AUTO: 39.7 % (ref 35–45)
HGB BLD-MCNC: 13.3 G/DL (ref 12–16)
IMM GRANULOCYTES # BLD AUTO: 0.1 K/UL (ref 0–0.04)
IMM GRANULOCYTES NFR BLD AUTO: 1 % (ref 0–0.5)
LYMPHOCYTES # BLD: 0.5 K/UL (ref 0.9–3.6)
LYMPHOCYTES NFR BLD: 3 % (ref 21–52)
MCH RBC QN AUTO: 29.8 PG (ref 24–34)
MCHC RBC AUTO-ENTMCNC: 33.5 G/DL (ref 31–37)
MCV RBC AUTO: 88.8 FL (ref 78–100)
MONOCYTES # BLD: 0.8 K/UL (ref 0.05–1.2)
MONOCYTES NFR BLD: 6 % (ref 3–10)
NEUTS SEG # BLD: 13 K/UL (ref 1.8–8)
NEUTS SEG NFR BLD: 90 % (ref 40–73)
NRBC # BLD: 0 K/UL (ref 0–0.01)
NRBC BLD-RTO: 0 PER 100 WBC
PLATELET # BLD AUTO: 148 K/UL (ref 135–420)
PMV BLD AUTO: 12 FL (ref 9.2–11.8)
POTASSIUM SERPL-SCNC: 3.7 MMOL/L (ref 3.5–5.5)
PROT SERPL-MCNC: 6.3 G/DL (ref 6.4–8.2)
RBC # BLD AUTO: 4.47 M/UL (ref 4.2–5.3)
SODIUM SERPL-SCNC: 129 MMOL/L (ref 136–145)
WBC # BLD AUTO: 14.5 K/UL (ref 4.6–13.2)

## 2022-05-19 PROCEDURE — 83880 ASSAY OF NATRIURETIC PEPTIDE: CPT

## 2022-05-19 PROCEDURE — 80053 COMPREHEN METABOLIC PANEL: CPT

## 2022-05-19 PROCEDURE — 1090F PRES/ABSN URINE INCON ASSESS: CPT | Performed by: INTERNAL MEDICINE

## 2022-05-19 PROCEDURE — G8432 DEP SCR NOT DOC, RNG: HCPCS | Performed by: INTERNAL MEDICINE

## 2022-05-19 PROCEDURE — G0463 HOSPITAL OUTPT CLINIC VISIT: HCPCS | Performed by: INTERNAL MEDICINE

## 2022-05-19 PROCEDURE — 87086 URINE CULTURE/COLONY COUNT: CPT

## 2022-05-19 PROCEDURE — 87186 SC STD MICRODIL/AGAR DIL: CPT

## 2022-05-19 PROCEDURE — 1101F PT FALLS ASSESS-DOCD LE1/YR: CPT | Performed by: INTERNAL MEDICINE

## 2022-05-19 PROCEDURE — 87077 CULTURE AEROBIC IDENTIFY: CPT

## 2022-05-19 PROCEDURE — G8754 DIAS BP LESS 90: HCPCS | Performed by: INTERNAL MEDICINE

## 2022-05-19 PROCEDURE — G8420 CALC BMI NORM PARAMETERS: HCPCS | Performed by: INTERNAL MEDICINE

## 2022-05-19 PROCEDURE — 85025 COMPLETE CBC W/AUTO DIFF WBC: CPT

## 2022-05-19 PROCEDURE — 99215 OFFICE O/P EST HI 40 MIN: CPT | Performed by: INTERNAL MEDICINE

## 2022-05-19 PROCEDURE — 36415 COLL VENOUS BLD VENIPUNCTURE: CPT

## 2022-05-19 PROCEDURE — 71046 X-RAY EXAM CHEST 2 VIEWS: CPT

## 2022-05-19 PROCEDURE — G8752 SYS BP LESS 140: HCPCS | Performed by: INTERNAL MEDICINE

## 2022-05-19 PROCEDURE — G8427 DOCREV CUR MEDS BY ELIG CLIN: HCPCS | Performed by: INTERNAL MEDICINE

## 2022-05-19 PROCEDURE — G8536 NO DOC ELDER MAL SCRN: HCPCS | Performed by: INTERNAL MEDICINE

## 2022-05-19 RX ORDER — SULFAMETHOXAZOLE AND TRIMETHOPRIM 800; 160 MG/1; MG/1
1 TABLET ORAL 2 TIMES DAILY
Qty: 10 TABLET | Refills: 0 | Status: SHIPPED | OUTPATIENT
Start: 2022-05-19 | End: 2022-05-24

## 2022-05-19 NOTE — PROGRESS NOTES
81 y.o. female who presents for evaluation. A lifelong family friend, Wes Shaw, drove her for the visit    She apparently has been living with friends/caretakers for the last several months. Interestingly, she transfers from 1 house to another as these people volunteer to watch her. Her current caretaker is concerned that she has a UTI. She has been more forgetful, not as interested and interactive, \"slowing down\", not eating as much. No reports of dysuria, hematuria, flank pain, there was a mild temperature spike of about 100 a few days back. Patient does have dementia diagnosed about a year ago. They are also reporting that she is \"breathing harder than usual\". Patient denies being short of breath, she does not ambulate much but they have not noted any decreased ability to walk around the house. No chest pain, pleurisy, they have noticed some increasing foot edema. No PND or orthopnea. She is known to have varicose veins and COPD. Continues to smoke and as far as the caretaker can say today, she takes her inhalers.     Past Medical History:   Diagnosis Date    Allergic rhinitis     BCC (basal cell carcinoma of skin)     Dr. Giovanna Garcia [facility]) 03/2020    Dr Chris Menjivar; ER/IN+, HER-2 neg infiltrating ductal ca 2 sites LEFT breast; Dr Nikky Kumari adenoma     1/15 Dr Dc Pham; polyp 2006 Dr Elois Krabbe COPD     on films    Dementia [facility]) 03/2021    mixed vascular and AD Dr Grace Holman Dyslipidemia     FHx: heart disease     GERD (gastroesophageal reflux disease)     s/p dilation Dr. Gonzalez Has 2006; [facility] and esophagitis 1/15 Dr Vandana Vela H/O cardiac catheterization 02/2021    Dr Sudhir Cisneros [facility]; LAD mild irreg, L Cx clean, RCA heavy calc throughout, occluded midsegment, s/p cutting balloon and angioscult, STEPHANE x3    H/O echocardiogram 02/2021    [facility]; ef 56%, gr 2dd, mild MR    Monoclonal gammopathy 2011 Dr. Guerda Ontiveros Osteoporosis Dr. Mcallister Staff, t score -0.6 spine, -2.7 hip     took forteo 2012 to 2014 Dr Edmar Chase    STEMI (ST elevation myocardial infarction) [facility]) 02/2021    [facility]; inf/post MI; cath Dr Luisa Sandoval showed cutting balloon angioplasty, angiosculpt, STEPHANE x3 heavily calc RCA    Tobacco dependence syndrome     Varicose vein of leg      Past Surgical History:   Procedure Laterality Date    HX BREAST LUMPECTOMY Left 6/30/2020    Left Sentinal Node Biopsy performed by [facility]y Tim [facility] at [facility] MAIN OR    R [address] Right 05/2017    and thumb surgery Dr Cooper Zheng 2006 polyp; Dr Emmett Irving 1/15 adenoma    HX HYSTERECTOMY  1970s    cervical precancer    HX MASTECTOMY Left 6/30/2020    Partial Mastectomy 2 site localization performed by Misty Tim MD at [facility] MAIN OR    HX ORTHOPAEDIC      DEXA t score 0.32 spine, -2.28 hip FRAX 5.3 hip Dr Edmar Chase (12/13); spine 0.68, hip -2.51 w FRAX 7.2 (12/15)    NEUROLOGICAL PROCEDURE UNLISTED      s/p c spine surgery [facility] 2010?  NEUROLOGICAL PROCEDURE UNLISTED      MMSE 30/30 (12/17);   MMSE 20/30 (9/20)    MO BREAST SURGERY PROCEDURE UNLISTED      neg RIGHT breast biopsy Dr. Ramires Home; Dr Laura Burger 3/20     Social History     Socioeconomic History    Marital status:      Spouse name: Not on file    Number of children: 3    Years of education: Not on file    Highest education level: Not on file   Occupational History    Occupation: retired [employer]   Tobacco Use    Smoking status: Current Every Day Smoker     Packs/day: 1.00     Years: 50.00     Pack years: 50.00     Types: Cigarettes    Smokeless tobacco: Never Used   Substance and Sexual Activity    Alcohol use: No    Drug use: No    Sexual activity: Not on file   Other Topics Concern    Not on file   Social History Narrative    Not on file     Social Determinants of Health     Financial Resource Strain:     Difficulty of Paying Living Expenses: Not on file   Food Insecurity:     Worried About Running Out of Food in the Last Year: Not on file    Ran Out of Food in the Last Year: Not on file   Transportation Needs:     Lack of Transportation (Medical): Not on file    Lack of Transportation (Non-Medical): Not on file   Physical Activity:     Days of Exercise per Week: Not on file    Minutes of Exercise per Session: Not on file   Stress:     Feeling of Stress : Not on file   Social Connections:     Frequency of Communication with Friends and Family: Not on file    Frequency of Social Gatherings with Friends and Family: Not on file    Attends Church Services: Not on file    Active Member of [address] [place] [address] [employer] or Organizations: Not on file    Attends Club or Organization Meetings: Not on file    Marital Status: Not on file   Intimate Partner Violence:     Fear of Current or Ex-Partner: Not on file    Emotionally Abused: Not on file    Physically Abused: Not on file    Sexually Abused: Not on file   Housing Stability:     Unable to Pay for Housing in the Last Year: Not on file    Number of [place] in the Last Year: Not on file    Unstable Housing in the Last Year: Not on file     Current Outpatient Medications   Medication Sig    trimethoprim-sulfamethoxazole (BACTRIM DS, SEPTRA DS) 160-800 mg per tablet Take 1 Tablet by mouth two (2) times a day for 5 days.  metoprolol tartrate (LOPRESSOR) 25 mg tablet Take 1 Tablet by mouth two (2) times a day.  amLODIPine (NORVASC) 5 mg tablet Take 1 Tablet by mouth daily.  atorvastatin (LIPITOR) 80 mg tablet Take 1 Tablet by mouth daily.  ezetimibe (Zetia) 10 mg tablet Take 1 Tablet by mouth daily.  tiotropium (Spiriva with HandiHaler) 18 mcg inhalation capsule Take 1 Capsule by inhalation daily.  albuterol (PROVENTIL HFA, VENTOLIN HFA, PROAIR HFA) 90 mcg/actuation inhaler Take 1 Puff by inhalation every six (6) hours as needed for Wheezing.  ticagrelor (BRILINTA) 90 mg tablet Take 1 Tab by mouth two (2) times a day.     aspirin 81 mg chewable tablet Take 1 Tab by mouth daily.  letrozole (FEMARA) 2.5 mg tablet Take 2.5 mg by mouth daily. (Patient not taking: Reported on 11/30/2021)    CALCIUM CARBONATE (CALCIUM 500 PO) Take  by mouth daily. No current facility-administered medications for this visit. Allergies   Allergen Reactions    Penicillins Rash and Hives     Visit Vitals  /70   Pulse 98   Ht 5' 5\" (1.651 m)   Wt 127 lb (57.6 kg)   SpO2 98%   BMI 21.13 kg/m²   Patient mildly tachypneic, looks comfortable however. Sinuses nontender, no JVD. Lungs with diminished breath sounds bilaterally, rare crackles at the bilateral bases. No overt wheezing. Heart showed regular rate rhythm, no s3. Abdomen soft and nontender, no hepatosplenomegaly. Back showed no CVA tenderness. Extremities with venous stasis change and varicose veins, trace ankle edema bilaterally    Urinalysis 3+ leukocytes, 2+ protein, negative nitrite, 2+ blood    Assessment and plan:  1. Probable UTI. Empiric Bactrim given pending culture results  2. Dementia which probably accounts for the bulk of her reported symptoms  3. COPD. Continue inhalers. 4.  Dyspnea? Check chest x-ray, labs including proBNP and further recommendations pending outcomes. 5.  Probable dependent edema. Discussed possibly adding diuretic in the future but held off for now. Instructions above were handwritten on the lab results sheet that I gave the patient today    Above conditions discussed at length and patient vocalized understanding. All questions answered to patient satisfaction        ICD-10-CM ICD-9-CM    1. Acute cystitis without hematuria  N30.00 595.0 trimethoprim-sulfamethoxazole (BACTRIM DS, SEPTRA DS) 160-800 mg per tablet      CULTURE, URINE   2. Late onset Alzheimer's dementia with behavioral disturbance (HCC)  G30.1 331.0     F02.81 294.11    3. Chronic obstructive pulmonary disease, unspecified COPD type (HCC)  J44.9 496 XR CHEST PA LAT   4. Hypervolemia, unspecified hypervolemia type  E87.70 276.69 XR CHEST PA LAT      CBC WITH AUTOMATED DIFF      METABOLIC PANEL, COMPREHENSIVE      NT-PRO BNP   5.  Primary hypertension  I10 401.9

## 2022-05-19 NOTE — PROGRESS NOTES
Earnestine Camacho presents with No chief complaint on file. 1. \"Have you been to the ER, urgent care clinic since your last visit? Hospitalized since your last visit? \" No    2. \"Have you seen or consulted any other health care providers outside of the [address] [place] [address] since your last visit? \" YES, [facility] (on chart)     3. For patients aged 39-70: Has the patient had a colonoscopy / FIT/ Cologuard? NA - based on age      If the patient is female:    4. For patients aged 41-77: Has the patient had a mammogram within the past 2 years? NA - based on age or sex      11. For patients aged 21-65: Has the patient had a pap smear?  NA - based on age or sex

## 2022-05-22 LAB
BACTERIA SPEC CULT: ABNORMAL
CC UR VC: ABNORMAL
SERVICE CMNT-IMP: ABNORMAL

## 2022-05-27 ENCOUNTER — TELEPHONE (OUTPATIENT)
Dept: INTERNAL MEDICINE CLINIC | Age: 82
End: 2022-05-27

## 2022-05-27 DIAGNOSIS — E87.70 HYPERVOLEMIA, UNSPECIFIED HYPERVOLEMIA TYPE: Primary | ICD-10-CM

## 2022-05-27 RX ORDER — FUROSEMIDE 20 MG/1
20 TABLET ORAL DAILY
Qty: 30 TABLET | Refills: 0 | Status: ON HOLD | OUTPATIENT
Start: 2022-05-27 | End: 2022-09-15 | Stop reason: SDUPTHER

## 2022-05-27 RX ORDER — POTASSIUM CHLORIDE 750 MG/1
10 TABLET, EXTENDED RELEASE ORAL DAILY
Qty: 30 TABLET | Refills: 0 | Status: SHIPPED | OUTPATIENT
Start: 2022-05-27 | End: 2022-09-16

## 2022-05-27 NOTE — TELEPHONE ENCOUNTER
Spoke with daughter Per Brown 337U 5/27/22  Reviewed the labs, cxr and urine cx results  probnp elevated, cxr w copd no infiltrate, e coli on cx but sensitive to bactrim given, cr elevated and hyponatremia as well  Will give lasix 20 every day over the weekend   They will call w update tues at the latest and sched ov the following week (only days the daughter can take her)

## 2022-06-08 ENCOUNTER — HOSPITAL ENCOUNTER (OUTPATIENT)
Dept: LAB | Age: 82
Discharge: HOME | End: 2022-06-08
Payer: MEDICARE

## 2022-06-08 ENCOUNTER — OFFICE VISIT (OUTPATIENT)
Dept: INTERNAL MEDICINE CLINIC | Age: 82
End: 2022-06-08
Payer: MEDICARE

## 2022-06-08 VITALS
OXYGEN SATURATION: 100 % | HEART RATE: 69 BPM | HEIGHT: 65 IN | DIASTOLIC BLOOD PRESSURE: 63 MMHG | BODY MASS INDEX: 19.83 KG/M2 | SYSTOLIC BLOOD PRESSURE: 100 MMHG | WEIGHT: 119 LBS | RESPIRATION RATE: 16 BRPM | TEMPERATURE: 97.5 F

## 2022-06-08 DIAGNOSIS — I50.9 ACUTE CONGESTIVE HEART FAILURE, UNSPECIFIED HEART FAILURE TYPE (HCC): ICD-10-CM

## 2022-06-08 DIAGNOSIS — I50.9 ACUTE CONGESTIVE HEART FAILURE, UNSPECIFIED HEART FAILURE TYPE (HCC): Primary | ICD-10-CM

## 2022-06-08 DIAGNOSIS — I34.0 NONRHEUMATIC MITRAL VALVE REGURGITATION: ICD-10-CM

## 2022-06-08 DIAGNOSIS — E87.70 HYPERVOLEMIA, UNSPECIFIED HYPERVOLEMIA TYPE: ICD-10-CM

## 2022-06-08 DIAGNOSIS — N17.9 AKI (ACUTE KIDNEY INJURY) (HCC): ICD-10-CM

## 2022-06-08 DIAGNOSIS — E87.1 HYPONATREMIA: ICD-10-CM

## 2022-06-08 LAB
ANION GAP SERPL CALC-SCNC: 6 MMOL/L (ref 3–18)
BNP SERPL-MCNC: 545 PG/ML (ref 0–1800)
BUN SERPL-MCNC: 20 MG/DL (ref 7–18)
BUN/CREAT SERPL: 18 (ref 12–20)
CALCIUM SERPL-MCNC: 10.3 MG/DL (ref 8.5–10.1)
CHLORIDE SERPL-SCNC: 102 MMOL/L (ref 100–111)
CO2 SERPL-SCNC: 28 MMOL/L (ref 21–32)
CREAT SERPL-MCNC: 1.1 MG/DL (ref 0.6–1.3)
GLUCOSE SERPL-MCNC: 81 MG/DL (ref 74–99)
POTASSIUM SERPL-SCNC: 3.9 MMOL/L (ref 3.5–5.5)
SODIUM SERPL-SCNC: 136 MMOL/L (ref 136–145)

## 2022-06-08 PROCEDURE — 80048 BASIC METABOLIC PNL TOTAL CA: CPT

## 2022-06-08 PROCEDURE — G8536 NO DOC ELDER MAL SCRN: HCPCS | Performed by: INTERNAL MEDICINE

## 2022-06-08 PROCEDURE — G8752 SYS BP LESS 140: HCPCS | Performed by: INTERNAL MEDICINE

## 2022-06-08 PROCEDURE — 1101F PT FALLS ASSESS-DOCD LE1/YR: CPT | Performed by: INTERNAL MEDICINE

## 2022-06-08 PROCEDURE — G8754 DIAS BP LESS 90: HCPCS | Performed by: INTERNAL MEDICINE

## 2022-06-08 PROCEDURE — 1090F PRES/ABSN URINE INCON ASSESS: CPT | Performed by: INTERNAL MEDICINE

## 2022-06-08 PROCEDURE — 1123F ACP DISCUSS/DSCN MKR DOCD: CPT | Performed by: INTERNAL MEDICINE

## 2022-06-08 PROCEDURE — G8427 DOCREV CUR MEDS BY ELIG CLIN: HCPCS | Performed by: INTERNAL MEDICINE

## 2022-06-08 PROCEDURE — G0463 HOSPITAL OUTPT CLINIC VISIT: HCPCS | Performed by: INTERNAL MEDICINE

## 2022-06-08 PROCEDURE — 36415 COLL VENOUS BLD VENIPUNCTURE: CPT

## 2022-06-08 PROCEDURE — G8420 CALC BMI NORM PARAMETERS: HCPCS | Performed by: INTERNAL MEDICINE

## 2022-06-08 PROCEDURE — G8510 SCR DEP NEG, NO PLAN REQD: HCPCS | Performed by: INTERNAL MEDICINE

## 2022-06-08 PROCEDURE — 99214 OFFICE O/P EST MOD 30 MIN: CPT | Performed by: INTERNAL MEDICINE

## 2022-06-08 PROCEDURE — 83880 ASSAY OF NATRIURETIC PEPTIDE: CPT

## 2022-06-08 NOTE — PROGRESS NOTES
Earnestine Camacho presents with No chief complaint on file. 1. \"Have you been to the ER, urgent care clinic since your last visit? Hospitalized since your last visit? \" No    2. \"Have you seen or consulted any other health care providers outside of the [address] [place] [address] since your last visit? \" No     3. For patients aged 39-70: Has the patient had a colonoscopy / FIT/ Cologuard? NA - based on age      If the patient is female:    4. For patients aged 41-77: Has the patient had a mammogram within the past 2 years? NA - based on age or sex      11. For patients aged 21-65: Has the patient had a pap smear?  NA - based on age or sex

## 2022-06-08 NOTE — PROGRESS NOTES
81 y.o. female who presents for evaluation. Family members present for the evaluation    We saw her in mid May at which time she was complaining of UTI type symptoms, generalized \"slowing down\" and also increasing HANNON. Chest x-ray negative, proBNP was elevated, she also had electrolyte disturbance with hyponatremia and elevated creatinine. We started her on Lasix and she has lost roughly 7-8 pounds. The family thinks she is able to do more, the patient is not so convinced. No chest pain, PND, orthopnea. She is followed by ANNEMARIE Dickens for cardiology at [facility]. Last echo as below. She has not had chest pains.     Past Medical History:   Diagnosis Date    Allergic rhinitis     BCC (basal cell carcinoma of skin)     Dr. Dell Beverly [facility]) 03/2020    Dr Mni Peterson; ER/DE+, HER-2 neg infiltrating ductal ca 2 sites LEFT breast; Dr Pantoja Levo adenoma     1/15 Dr Madiha Covarrubias; polyp 2006 Dr Carlyle Esparza COPD     on films    Dementia [facility]) 03/2021    mixed vascular and AD Dr Isabel Costello Dyslipidemia     FHx: heart disease     GERD (gastroesophageal reflux disease)     s/p dilation Dr. Wisam Levy 2006; [facility] and esophagitis 1/15 Dr Sathish Lane H/O cardiac catheterization 02/2021    Dr Tonny Funes [address]; LAD mild irreg, L Cx clean, RCA heavy calc throughout, occluded midsegment, s/p cutting balloon and angioscult, STEPHANE x3    H/O echocardiogram 02/2021    [address]; ef 56%, gr 2dd, mild MR    Monoclonal gammopathy 2011 Dr. Teresita Chris Osteoporosis Dr. Antoinette Poe, t score -0.6 spine, -2.7 hip     took forteo 2012 to 2014 Dr Antoinette Poe    STEMI (ST elevation myocardial infarction) [facility]) 02/2021    [address]; inf/post MI; cath Dr Tonyn Funes showed cutting balloon angioplasty, angiosculpt, STEPHANE x3 heavily calc RCA    Tobacco dependence syndrome     Varicose vein of leg      Past Surgical History:   Procedure Laterality Date    HX BREAST LUMPECTOMY Left 6/30/2020    Left Sentinal Node Biopsy performed by Arlet Jaja Davalos MD at [facility] MAIN OR    HX CARPAL TUNNEL RELEASE Right 05/2017    and thumb surgery Dr Sandra Kelelr 2006 polyp; Dr Kian Gonzalez 1/15 adenoma    HX HYSTERECTOMY  1970s    cervical precancer    HX MASTECTOMY Left 6/30/2020    Partial Mastectomy 2 site localization performed by Alyssa Murrell MD at [facility] MAIN OR    HX ORTHOPAEDIC      DEXA t score 0.32 spine, -2.28 hip FRAX 5.3 hip Dr Virgilio Hodgkins (12/13); spine 0.68, hip -2.51 w FRAX 7.2 (12/15)    NEUROLOGICAL PROCEDURE UNLISTED      s/p c spine surgery [facility] 2010?  NEUROLOGICAL PROCEDURE UNLISTED      MMSE 30/30 (12/17); MMSE 20/30 (9/20)    AR BREAST SURGERY PROCEDURE UNLISTED      neg RIGHT breast biopsy Dr. Andressa Fiore; Dr Mich Momin 3/20     Social History     Socioeconomic History    Marital status:      Spouse name: Not on file    Number of children: 3    Years of education: Not on file    Highest education level: Not on file   Occupational History    Occupation: [employer]   Tobacco Use    Smoking status: Current Every Day Smoker     Packs/day: 1.00     Years: 50.00     Pack years: 50.00     Types: Cigarettes    Smokeless tobacco: Never Used   Substance and Sexual Activity    Alcohol use: No    Drug use: No    Sexual activity: Not on file   Other Topics Concern    Not on file   Social History Narrative    Not on file     Social Determinants of Health     Financial Resource Strain:     Difficulty of Paying Living Expenses: Not on file   Food Insecurity:     Worried About [address] in the Last Year: Not on file    [address] in the Last Year: Not on file   Transportation Needs:     Lack of Transportation (Medical): Not on file    Lack of Transportation (Non-Medical):  Not on file   Physical Activity:     Days of Exercise per Week: Not on file    Minutes of Exercise per Session: Not on file   Stress:     Feeling of Stress : Not on file   Social Connections:     Frequency of Communication with Friends and Family: Not on file    Frequency of Social Gatherings with Friends and Family: Not on file    Attends Hindu Services: Not on file    Active Member of Clubs or Organizations: Not on file    Attends Club or Organization Meetings: Not on file    Marital Status: Not on file   Intimate Partner Violence:     Fear of Current or Ex-Partner: Not on file    Emotionally Abused: Not on file    Physically Abused: Not on file    Sexually Abused: Not on file   Housing Stability:     Unable to Pay for Housing in the Last Year: Not on file    Number of [place] in the Last Year: Not on file    Unstable Housing in the Last Year: Not on file     Current Outpatient Medications   Medication Sig    furosemide (LASIX) 20 mg tablet Take 1 Tablet by mouth daily.  potassium chloride (KLOR-CON M10) 10 mEq tablet Take 1 Tablet by mouth daily.  metoprolol tartrate (LOPRESSOR) 25 mg tablet Take 1 Tablet by mouth two (2) times a day.  amLODIPine (NORVASC) 5 mg tablet Take 1 Tablet by mouth daily.  atorvastatin (LIPITOR) 80 mg tablet Take 1 Tablet by mouth daily.  ezetimibe (Zetia) 10 mg tablet Take 1 Tablet by mouth daily.  tiotropium (Spiriva with HandiHaler) 18 mcg inhalation capsule Take 1 Capsule by inhalation daily.  albuterol (PROVENTIL HFA, VENTOLIN HFA, PROAIR HFA) 90 mcg/actuation inhaler Take 1 Puff by inhalation every six (6) hours as needed for Wheezing.  ticagrelor (BRILINTA) 90 mg tablet Take 1 Tab by mouth two (2) times a day.  aspirin 81 mg chewable tablet Take 1 Tab by mouth daily.  CALCIUM CARBONATE (CALCIUM 500 PO) Take  by mouth daily. No current facility-administered medications for this visit.      Allergies   Allergen Reactions    Penicillins Rash and Hives     Visit Vitals  /63   Pulse 69   Temp 97.5 °F (36.4 °C) (Temporal)   Resp 16   Ht 5' 5\" (1.651 m)   Wt 119 lb (54 kg)   SpO2 100%   BMI 19.80 kg/m²   A&O x3  Affect is appropriate. Mood stable  No apparent distress  Anicteric, no JVD, adenopathy or thyromegaly. No carotid bruits or radiated murmur  Lungs clear to auscultation, no wheezes or rales  Heart showed regular rate and rhythm. 1/6 syst murmur, no rubs, gallops  Abdomen soft nontender, no hepatosplenomegaly or masses. Extremities without edema. Pulses 1-2+ symmetrically    LABS  From 5/22 showed cr 1.45, wbc 14.5, hb 13.3, plt 148, proBNP 2863, na 129    Assessment and plan:  1. Probable CHF episode. Improved on diuretic. After long discussion, they opted to refer her back to cardiology. Check labs today  2. Hyponatremia. Check labs today, likely from hypervolemia  3. Renal.  Recheck today        Instructions above were handwritten on the lab results sheet that I gave the patient today    Above conditions discussed at length and patient vocalized understanding.   All questions answered to patient satisfaction

## 2022-06-09 ENCOUNTER — TELEPHONE (OUTPATIENT)
Dept: INTERNAL MEDICINE CLINIC | Age: 82
End: 2022-06-09

## 2022-06-09 DIAGNOSIS — E87.70 HYPERVOLEMIA, UNSPECIFIED HYPERVOLEMIA TYPE: ICD-10-CM

## 2022-06-09 NOTE — TELEPHONE ENCOUNTER
pls call    Results for orders placed or performed during the hospital encounter of 06/08/22   NT-PRO BNP   Result Value Ref Range    NT pro- 0 - 2,547 PG/ML   METABOLIC PANEL, BASIC   Result Value Ref Range    Sodium 136 136 - 145 mmol/L    Potassium 3.9 3.5 - 5.5 mmol/L    Chloride 102 100 - 111 mmol/L    CO2 28 21 - 32 mmol/L    Anion gap 6 3.0 - 18 mmol/L    Glucose 81 74 - 99 mg/dL    BUN 20 (H) 7.0 - 18 MG/DL    Creatinine 1.10 0.6 - 1.3 MG/DL    BUN/Creatinine ratio 18 12 - 20      GFR est AA 58 (L) >60 ml/min/1.73m2    GFR est non-AA 48 (L) >60 ml/min/1.73m2    Calcium 10.3 (H) 8.5 - 10.1 MG/DL     probnp back to nl so no more fluid overload  Sodium and creat back to nl as well  Follow up PA Chrissie/cardiology as discussed

## 2022-06-09 NOTE — TELEPHONE ENCOUNTER
Pts daugher aware of message below and verbalized understanding. No further questions or concerns from pts daughter at this time.

## 2022-06-09 NOTE — TELEPHONE ENCOUNTER
Pts daughter aware of message below and verbalized understanding. Patients daughter wants to know if she is suppose to stay on the fluid pill. Please advise.

## 2022-06-10 ENCOUNTER — TELEPHONE (OUTPATIENT)
Dept: INTERNAL MEDICINE CLINIC | Age: 82
End: 2022-06-10

## 2022-06-10 NOTE — TELEPHONE ENCOUNTER
Jennifer Ariza with [facility] called and needs last office note of 6/3/22 faxed over to [phone]. Printed and faxed.

## 2022-06-21 DIAGNOSIS — E78.5 DYSLIPIDEMIA: ICD-10-CM

## 2022-06-21 DIAGNOSIS — J41.0 SIMPLE CHRONIC BRONCHITIS (HCC): ICD-10-CM

## 2022-06-21 DIAGNOSIS — I10 ESSENTIAL HYPERTENSION: ICD-10-CM

## 2022-06-22 RX ORDER — METOPROLOL TARTRATE 25 MG/1
25 TABLET, FILM COATED ORAL 2 TIMES DAILY
Qty: 180 TABLET | Refills: 3 | OUTPATIENT
Start: 2022-06-22

## 2022-06-22 RX ORDER — AMLODIPINE BESYLATE 5 MG/1
5 TABLET ORAL DAILY
Qty: 90 TABLET | Refills: 3 | OUTPATIENT
Start: 2022-06-22

## 2022-06-22 RX ORDER — EZETIMIBE 10 MG/1
10 TABLET ORAL DAILY
Qty: 90 TABLET | Refills: 3 | OUTPATIENT
Start: 2022-06-22

## 2022-06-22 RX ORDER — ALBUTEROL SULFATE 90 UG/1
1 AEROSOL, METERED RESPIRATORY (INHALATION)
Qty: 1 EACH | Refills: 11 | OUTPATIENT
Start: 2022-06-22

## 2022-06-22 RX ORDER — ATORVASTATIN CALCIUM 80 MG/1
80 TABLET, FILM COATED ORAL DAILY
Qty: 90 TABLET | Refills: 3 | OUTPATIENT
Start: 2022-06-22

## 2022-06-22 NOTE — TELEPHONE ENCOUNTER
Albuterol, Lopressor, Norvasc, Lipitor, Zetia and Spiriva were all sent on 1/3/22 for a 1 year supply.       For Haim Shah in place:    Recommendation Provided To:    Intervention Detail: Discontinued Rx: 6, reason: Duplicate Therapy   Gap Closed?:    Intervention Accepted By:   [facility] Time Spent (min): 5

## 2022-07-12 ENCOUNTER — TELEPHONE (OUTPATIENT)
Dept: INTERNAL MEDICINE CLINIC | Age: 82
End: 2022-07-12

## 2022-07-12 NOTE — TELEPHONE ENCOUNTER
Patient's daughter Samantha Raza is calling stating they are moving her into assisted living. [facility] faxed forms over today to be completed. Stating it has to be filled out in its entirety. Cannot write \"see attachment\".

## 2022-07-14 NOTE — TELEPHONE ENCOUNTER
[facility] calling for update on paperwork they faxed over.  They are trying to get patient in this week    Call her with update Louise Elizalde [phone]

## 2022-07-15 NOTE — TELEPHONE ENCOUNTER
Daughter calling again. Says they really need papers completed. They have to get mother moved in.  Please caleb her with updated info

## 2022-07-15 NOTE — TELEPHONE ENCOUNTER
Wetsley Current with [facility]'s residential called again to check on forms, states patient is being discharged from another facility and has no where to go until this is done.     Please advise, thank you

## 2022-09-09 PROBLEM — S72.009A HIP FRACTURE (HCC): Status: ACTIVE | Noted: 2022-09-09

## 2022-09-19 ENCOUNTER — PATIENT OUTREACH (OUTPATIENT)
Dept: CASE MANAGEMENT | Age: 82
End: 2022-09-19

## 2022-09-19 NOTE — PROGRESS NOTES
9/19/2022     3:40 PM  CTN reviewed patient chart and noted the she was admitted to [facility] 9/9/2022 after a fall and obtaining a hip fx. Patient had surgical repair. She was then transferred to [facility] for continuation of care on 9/16/2022. Transition of care outreach postponed for 14 days due to patient's discharge to SNF. Will follow.

## 2022-09-26 ENCOUNTER — TELEPHONE (OUTPATIENT)
Dept: INTERNAL MEDICINE CLINIC | Age: 82
End: 2022-09-26

## 2022-09-26 NOTE — TELEPHONE ENCOUNTER
Patient's daughter is calling stating pt fell back on the 9th and was in the hospital and is now in rehab. She was advised to contact her PCP to see about getting and order for a wheelchair and a walker.

## 2022-09-28 NOTE — TELEPHONE ENCOUNTER
Patient's daughter is going to call us back and let us know what medical supply company they want to use so we can faxed the scripts to them. Scripts are in the file at the .

## 2022-09-28 NOTE — TELEPHONE ENCOUNTER
Daughter calling back, says the rx, demographics and last note need to go to [facility] in [place]. She did not have a fax number. Will patient need a visit to support claim or will they need hospital information? The last note was from June.

## 2022-09-28 NOTE — TELEPHONE ENCOUNTER
We dont have an ov note to support the claim  They will need to get the hospital notes  Fax script there

## 2022-10-03 ENCOUNTER — PATIENT OUTREACH (OUTPATIENT)
Dept: CASE MANAGEMENT | Age: 82
End: 2022-10-03

## 2022-10-03 NOTE — PROGRESS NOTES
Care Transitions Initial Call    Call within 2 business days of discharge: Yes     Patient: Jose Gardner Patient : 1940 MRN: [id number]    Last Discharge [address]       Date Complaint Diagnosis Description Type Department Provider    23 Fall Closed fracture of left hip, initial encounter ([facility] [address] . .. ED to Hosp-Admission (Discharged) (ADMIT) Leandro Bonilla MD; Demario Samuel, ... Was this an external facility discharge? Yes, 2022 to 2022 and then SNF 2022 to 2022  Discharge Facility: Critical access hospital and then [address] [place] [address] to be reviewed by the provider   Additional needs identified to be addressed with provider: no  none         Method of communication with provider : none    Discussed COVID-19 related testing which was not done at this time. Test results were not done. Patient informed of results, if available? N/a     Advance Care Planning:   Does patient have an Advance Directive: decision makers updated. Advance Care Planning   Healthcare Decision Maker:       Primary Decision Maker: Jennifer Shabbir - [facility] - [phone]    Click here to [address] including selection of the Healthcare Decision Maker Relationship (ie \"Primary\")            Inpatient Readmission Risk score: Unplanned Readmit Risk Score: 12.6    Was this a readmission? no   Patient stated reason for the admission: hip pain    Patients top risk factors for readmission: medical condition-CAD; Fall-hip fx with surgical repair and support system   Interventions to address risk factors: Scheduled appointment with PCP-Manj[facility]    Care Transition Nurse (CTN) contacted the caregiver by telephone to perform post hospital discharge assessment. Verified name and  with caregiver as identifiers. Provided introduction to self, and explanation of the CTN role.      CTN reviewed discharge instructions, medical action plan and red flags with caregiver who verbalized understanding. Were discharge instructions available to patient? yes. Reviewed appropriate site of care based on symptoms and resources available to patient including: PCP, Specialist, and MyChart Messaging. Caregiver given an opportunity to ask questions and does not have any further questions or concerns at this time. The caregiver agrees to contact the PCP office for questions related to their healthcare. Medication reconciliation was performed with caregiver, who verbalizes understanding of administration of home medications. Advised obtaining a 90-day supply of all daily and as-needed medications. Referral to Pharm D needed: no     Home Health/Outpatient orders at discharge: [address]: n/a  Date of initial visit: [facility] ordered at discharge: None  [address] received: n/a    Was patient discharged with a pulse oximeter? no    Discussed follow-up appointments. If no appointment was previously scheduled, appointment scheduling offered: yes. Is follow up appointment scheduled within 7 days of discharge? Not scheduled. Daughter to schedule appt. [facility] follow up appointment(s): No future appointments. Non-[facility] follow up appointment(s): n/a    Plan for follow-up call in 5-7 days based on severity of symptoms and risk factors. Plan for next call: symptom management-ensure that patient symptoms are under control. CTN provided contact information for future needs. Goals Addressed                   This Visit's Progress     Prevent complications post hospitalization. CTN will monitor X 4 weeks    Ensure provider appt is scheduled within 7 days post-discharge 10/3/2022 Patient has no appt at present. Will place on list.     Confirm patient attended post-discharge provider apt    Complete post-visit call to confirm attendance and update care needs 10/3/2022 Patient is doing well.  She is trying to get up and move about as that is what she is used to. Needs to heal.     Review/educate common or potential \"red flags\" of condition worsening 10/3/2022 Reviewed signs/symptoms of hip surgery such as stiffness, pain, swelling, and signs of infection to name a few. Evaluate adherence to medications and priority barriers to resolve 10/3/2022 Patient has all meds and is taking as directed. Instruct on adherence to medications as ordered and assess for therapeutic response and side-effects  10/3/2022 Staff at assisted living know why patient is taking meds and know when to contact physician for any issues. Discuss and evaluate ADL performance. Provide recommendations on energy conservation, particularly related to transition home from an inpatient admission. 10/3/2022 Patient is doing what she can. She wants to get up and go but has to allow hip to heal a little more. WIll follow.

## 2022-10-10 ENCOUNTER — PATIENT OUTREACH (OUTPATIENT)
Dept: CASE MANAGEMENT | Age: 82
End: 2022-10-10

## 2022-10-10 NOTE — PROGRESS NOTES
Care Transitions Follow Up Call    Challenges to be reviewed by the provider   Additional needs identified to be addressed with provider: no  none           Method of communication with provider : none    Care Transition Nurse (CTN) contacted the family by telephone to follow up after admission on 2022 to 2022. Verified name and  with family as identifiers. Addressed changes since last contact: none  Follow up appointment completed? no.   Was follow up appointment scheduled within 7 days of discharge? no.     Advance Care Planning:   Does patient have an Advance Directive:  yes; reviewed and current     CTN reviewed discharge instructions, medical action plan and red flags with family and discussed any barriers to care and/or understanding of plan of care after discharge. Discussed appropriate site of care based on symptoms and resources available to patient including: PCP and [employer] Messaging. The family agrees to contact the PCP office for questions related to their healthcare. Patients top risk factors for readmission: medical condition-CAD; hip fracture with surgical repair and support system   Interventions to address risk factors: Scheduled appointment with PCP-[facility] follow up appointment(s):   Future Appointments   Date Time Provider Milady Linares   10/13/2022  9:30 AM Patsy Ying MD [facility]     Non-[facility] follow up appointment(s): n/a    CTN provided contact information for future needs. Plan for follow-up call in 5-7 days based on severity of symptoms and risk factors. Plan for next call: self management-ensure that patient knows who to contact if she needs assistance. Goals Addressed                   This Visit's Progress     Prevent complications post hospitalization. CTN will monitor X 4 weeks    Ensure provider appt is scheduled within 7 days post-discharge 10/3/2022 Patient has no appt at present.  Will place on list.     Confirm patient attended post-discharge provider apt    Complete post-visit call to confirm attendance and update care needs 10/3/2022 Patient is doing well. She is trying to get up and move about as that is what she is used to. Needs to heal. 10/10/2022 patient is doing ok. She fell at assisted living the other day and is hurting. Review/educate common or potential \"red flags\" of condition worsening 10/3/2022 Reviewed signs/symptoms of hip surgery such as stiffness, pain, swelling, and signs of infection to name a few. Evaluate adherence to medications and priority barriers to resolve 10/3/2022 Patient has all meds and is taking as directed. 10/10/2022 patient has meds and is taking as instructed. Instruct on adherence to medications as ordered and assess for therapeutic response and side-effects  10/3/2022 Staff at assisted [facility] know why patient is taking meds and know when to contact physician for any issues. 10/10/2022 Patient has no concerns about meds at present. Discuss and evaluate ADL performance. Provide recommendations on energy conservation, particularly related to transition home from an inpatient admission. 10/3/2022 Patient is doing what she can. She wants to get up and go but has to allow hip to heal a little more. 10/10/2022 Patient is doing ok. She is trying to get up slowly. She rests as needed. Still in pain.

## 2022-10-13 ENCOUNTER — HOSPITAL ENCOUNTER (OUTPATIENT)
Dept: LAB | Age: 82
Discharge: HOME | End: 2022-10-13
Payer: MEDICARE

## 2022-10-13 ENCOUNTER — TELEPHONE (OUTPATIENT)
Dept: INTERNAL MEDICINE CLINIC | Age: 82
End: 2022-10-13

## 2022-10-13 ENCOUNTER — OFFICE VISIT (OUTPATIENT)
Dept: INTERNAL MEDICINE CLINIC | Age: 82
End: 2022-10-13
Payer: MEDICARE

## 2022-10-13 VITALS
RESPIRATION RATE: 16 BRPM | BODY MASS INDEX: 20.43 KG/M2 | TEMPERATURE: 98.1 F | SYSTOLIC BLOOD PRESSURE: 108 MMHG | OXYGEN SATURATION: 100 % | HEART RATE: 87 BPM | HEIGHT: 64 IN | DIASTOLIC BLOOD PRESSURE: 64 MMHG

## 2022-10-13 DIAGNOSIS — R53.1 WEAKNESS GENERALIZED: ICD-10-CM

## 2022-10-13 DIAGNOSIS — S72.002D CLOSED FRACTURE OF LEFT HIP WITH ROUTINE HEALING, SUBSEQUENT ENCOUNTER: ICD-10-CM

## 2022-10-13 DIAGNOSIS — I25.10 CORONARY ARTERY DISEASE DUE TO CALCIFIED CORONARY LESION: ICD-10-CM

## 2022-10-13 DIAGNOSIS — I25.84 CORONARY ARTERY DISEASE DUE TO CALCIFIED CORONARY LESION: ICD-10-CM

## 2022-10-13 DIAGNOSIS — R53.1 WEAKNESS GENERALIZED: Primary | ICD-10-CM

## 2022-10-13 DIAGNOSIS — I10 ESSENTIAL HYPERTENSION: ICD-10-CM

## 2022-10-13 LAB
25(OH)D3 SERPL-MCNC: 38.7 NG/ML (ref 30–100)
VIT B12 SERPL-MCNC: 334 PG/ML (ref 211–911)

## 2022-10-13 PROCEDURE — 99213 OFFICE O/P EST LOW 20 MIN: CPT | Performed by: INTERNAL MEDICINE

## 2022-10-13 PROCEDURE — G0463 HOSPITAL OUTPT CLINIC VISIT: HCPCS | Performed by: INTERNAL MEDICINE

## 2022-10-13 PROCEDURE — 36415 COLL VENOUS BLD VENIPUNCTURE: CPT

## 2022-10-13 PROCEDURE — 1123F ACP DISCUSS/DSCN MKR DOCD: CPT | Performed by: INTERNAL MEDICINE

## 2022-10-13 PROCEDURE — 82607 VITAMIN B-12: CPT

## 2022-10-13 PROCEDURE — 82306 VITAMIN D 25 HYDROXY: CPT

## 2022-10-13 NOTE — PROGRESS NOTES
Cira Brunoming presents today for   Chief Complaint   Patient presents with    Hip Injury       1. \"Have you been to the ER, urgent care clinic since your last visit? Hospitalized since your last visit? \" yes    2. \"Have you seen or consulted any other health care providers outside of the [address] [place] [address] since your last visit? \" yes   [facility]    3. For patients aged 39-70: Has the patient had a colonoscopy / FIT/ Cologuard? NA - based on age      If the patient is female:    4. For patients aged 41-77: Has the patient had a mammogram within the past 2 years? NA - based on age or sex  See top three    5. For patients aged 21-65: Has the patient had a pap smear?  NA - based on age or sex

## 2022-10-13 NOTE — PROGRESS NOTES
Raiza Matias is a 81y.o. year old female who is a new patient to me today. She follows with Dr. Nora Dickinson, her PCP. Subjective:   Raiza Matias was seen today for Hip Injury    Patient was hospitalized on 9/9 for left hip fracture, underwent surgical repair. Patient was in rehab after that. Patient is currently staying at assisted living. Patient's son is accompanying the patient. Patient is noncommunicative except for with her son. Son is helping with all the communication with the patient. Son has noticed no new complaints. He feels that her mother is very tired and may benefit from some extra protein supplements. No other complaints or concerns mentioned. Patient also had an episode of UTI on 10/1, received liquid cephalexin 500 mg 3 times a day for a week. Reviewed recent labs, within acceptable range.       Past Medical History:   Diagnosis Date    Allergic rhinitis     BCC (basal cell carcinoma of skin)     Dr. Alec Franco    Breast cancer [facility]) 03/2020    Dr Wanda Castro; ER/CT+, HER-2 neg infiltrating ductal ca 2 sites LEFT breast; Dr Reece Loyd adenoma     1/15 Dr Jimbo Díaz; polyp 2006 Dr Juana Norman    COPD     on films    Dementia [facility]) 03/2021    mixed vascular and AD Dr Franklin Gill    Dyslipidemia     FHx: heart disease     GERD (gastroesophageal reflux disease)     s/p dilation Dr. Juana Norman 2006; [facility] and esophagitis 1/15 Dr Jimbo Díaz    H/O cardiac catheterization 02/2021    Dr Gurdeep Alonso [facility]; LAD mild irreg, L Cx clean, RCA heavy calc throughout, occluded midsegment, s/p cutting balloon and angioscult, STEPHANE x3    H/O echocardiogram 02/2021    [facility]; ef 56%, gr 2dd, mild MR    Monoclonal gammopathy 2011 Dr. Patrizia Sánchez     Osteoporosis Dr. Leanna Spears, t score -0.6 spine, -2.7 hip     took forteo 2012 to 2014 Dr Herring    STEMI (ST elevation myocardial infarction) [facility]) 02/2021    [facility]; inf/post MI; cath Dr Gurdeep Alonso showed cutting balloon angioplasty, angiosculpt, STEPHANE x3 heavily calc RCA    Tobacco dependence syndrome     Varicose vein of leg        Past Surgical History:   Procedure Laterality Date    HX BREAST LUMPECTOMY Left 6/30/2020    Left Sentinal Node Biopsy performed by Libia Owens MD at [facility] MAIN OR    HX [address] Right 05/2017    and thumb surgery Dr Quin Winters 2006 polyp; Dr Tsang Puls 1/15 adenoma    HX HYSTERECTOMY  1970s    cervical precancer    HX MASTECTOMY Left 6/30/2020    Partial Mastectomy 2 site localization performed by Libia Owens MD at [facility] MAIN OR    HX ORTHOPAEDIC      DEXA t score 0.32 spine, -2.28 hip FRAX 5.3 hip Dr Taj Luis (12/13); spine 0.68, hip -2.51 w FRAX 7.2 (12/15)    NEUROLOGICAL PROCEDURE UNLISTED      s/p c spine surgery [facility] 2010? NEUROLOGICAL PROCEDURE UNLISTED      MMSE 30/30 (12/17);   MMSE 20/30 (9/20)    UT BREAST SURGERY PROCEDURE UNLISTED      neg RIGHT breast biopsy Dr. Dona Duke; Dr Teresa Ascencio 3/20       Family History   Problem Relation Age of Onset    Heart Attack Father     Kidney Disease Mother         ADPKD    Diabetes Sister     Cancer Sister         leukemia       Social History     Socioeconomic History    Marital status:      Spouse name: Not on file    Number of children: 3    Years of education: Not on file    Highest education level: Not on file   Occupational History    Occupation: retired [employer]   Tobacco Use    Smoking status: Every Day     Packs/day: 1.00     Years: 50.00     Pack years: 50.00     Types: Cigarettes    Smokeless tobacco: Never   Substance and Sexual Activity    Alcohol use: No    Drug use: No    Sexual activity: Not on file   Other Topics Concern    Not on file   Social History Narrative    Not on file     Social Determinants of Health     Financial Resource Strain: Not on file   Food Insecurity: Not on file   Transportation Needs: Not on file   Physical Activity: Not on file   Stress: Not on file   Social Connections: Not on file   Intimate Partner Violence: Not on file   Housing Stability: Not on file       Allergies   Allergen Reactions    Penicillins Rash and Hives       Current Outpatient Medications on File Prior to Visit   Medication Sig Dispense Refill    OTHER Wheelchair  Dx; hip fracture 1 Each 0    OTHER Walker  Dx; hip fracture 1 Each 0    furosemide (LASIX) 20 mg tablet Take 1 Tablet by mouth daily as needed (leg swelling). 30 Tablet 0    acetaminophen (TYLENOL) 325 mg tablet Take 2 Tablets by mouth every six (6) hours as needed (pain). 30 Tablet 0    pantoprazole (PROTONIX) 40 mg tablet Take 1 Tablet by mouth Daily (before breakfast). 14 Tablet 0    metoprolol tartrate (LOPRESSOR) 25 mg tablet Take 1 Tablet by mouth two (2) times a day. 180 Tablet 3    amLODIPine (NORVASC) 5 mg tablet Take 1 Tablet by mouth daily. 90 Tablet 3    atorvastatin (LIPITOR) 80 mg tablet Take 1 Tablet by mouth daily. 90 Tablet 3    ezetimibe (Zetia) 10 mg tablet Take 1 Tablet by mouth daily. 90 Tablet 3    tiotropium (Spiriva with HandiHaler) 18 mcg inhalation capsule Take 1 Capsule by inhalation daily. 90 Capsule 3    albuterol (PROVENTIL HFA, VENTOLIN HFA, PROAIR HFA) 90 mcg/actuation inhaler Take 1 Puff by inhalation every six (6) hours as needed for Wheezing. 1 Each 11    ticagrelor (BRILINTA) 90 mg tablet Take 1 Tab by mouth two (2) times a day. 180 Tab 3    aspirin 81 mg chewable tablet Take 1 Tab by mouth daily. 180 Tab 1    CALCIUM CARBONATE (CALCIUM 500 PO) Take  by mouth daily. No current facility-administered medications on file prior to visit. ROS, as above        Objective:     Vitals:    10/13/22 0941   BP: 108/64   Pulse: 87   Resp: 16   Temp: 98.1 °F (36.7 °C)   TempSrc: Temporal   SpO2: 100%   Height: 5' 4\" (1.626 m)      Physical Exam  Constitutional:       Appearance: Normal appearance. HENT:      Head: Normocephalic and atraumatic.       Nose: Nose normal.      Mouth/Throat:      Mouth: Mucous membranes are moist.   Eyes: Conjunctiva/sclera: Conjunctivae normal.      Pupils: Pupils are equal, round, and reactive to light. Cardiovascular:      Rate and Rhythm: Normal rate and regular rhythm. Pulses: Normal pulses. Heart sounds: Normal heart sounds. Pulmonary:      Effort: Pulmonary effort is normal.      Breath sounds: Normal breath sounds. Abdominal:      General: Abdomen is flat. Bowel sounds are normal.      Palpations: Abdomen is soft. Musculoskeletal:      Cervical back: Normal range of motion and neck supple. Comments: Left hip skin wound healed. Skin:     General: Skin is warm and dry. Neurological:      General: No focal deficit present. Mental Status: She is alert. Mental status is at baseline.       Comments: Patient noncommunicative   Psychiatric:         Mood and Affect: Mood normal.         Behavior: Behavior normal.          Labs:     Results for orders placed or performed during the hospital encounter of 09/09/22   CULTURE, URINE    Specimen: Clean catch; Urine   Result Value Ref Range    Isolate (A)       >100,000 CFU/mL  Beta Hemolytic Streptococcus Group B  No Further Workup      Isolate (A)       <10,000 CFU/mL  Gram Negative Bacilli Isolated  No Further Workup     CBC WITH AUTOMATED DIFF   Result Value Ref Range    WBC 13.2 (H) 4.0 - 11.0 1000/mm3    RBC 4.20 3.60 - 5.20 M/uL    HGB 11.7 (L) 13.0 - 17.2 gm/dl    HCT 36.4 (L) 37.0 - 50.0 %    MCV 86.7 80.0 - 98.0 fL    MCH 27.9 25.4 - 34.6 pg    MCHC 32.1 30.0 - 36.0 gm/dl    PLATELET 188 635 - 000 1000/mm3    MPV 10.0 6.0 - 10.0 fL    RDW-SD 48.2 (H) 36.4 - 46.3      NRBC 0 0 - 0      IMMATURE GRANULOCYTES 0.6 0.0 - 3.0 %    NEUTROPHILS 83.1 (H) 34 - 64 %    LYMPHOCYTES 7.7 (L) 28 - 48 %    MONOCYTES 7.9 1 - 13 %    EOSINOPHILS 0.4 0 - 5 %    BASOPHILS 0.3 0 - 3 %   METABOLIC PANEL, COMPREHENSIVE   Result Value Ref Range    Potassium 3.8 3.5 - 5.1 mEq/L    Chloride 97 (L) 98 - 107 mEq/L    Sodium 132 (L) 136 - 145 mEq/L    CO2 22 20 - 31 mEq/L    Glucose 107 (H) 74 - 106 mg/dl    BUN 17 9 - 23 mg/dl    Creatinine 0.93 0.55 - 1.02 mg/dl    GFR est AA >60.0      GFR est non-AA >60      Calcium 9.1 8.7 - 10.4 mg/dl    Anion gap 13 5 - 15 mmol/L    AST (SGOT) 18.0 0.0 - 33.9 U/L    ALT (SGPT) 17 10 - 49 U/L    Alk.  phosphatase 106 46 - 116 U/L    Bilirubin, total 0.70 0.30 - 1.20 mg/dl    Protein, total 7.0 5.7 - 8.2 gm/dl    Albumin 3.5 3.4 - 5.0 gm/dl   URINALYSIS W/ RFLX MICROSCOPIC   Result Value Ref Range    Color STRAW YELLOW,STRAW      Appearance CLOUDY (A) CLEAR      Glucose NEGATIVE NEGATIVE,Negative mg/dl    Bilirubin NEGATIVE NEGATIVE,Negative      Ketone NEGATIVE NEGATIVE,Negative mg/dl    Specific gravity 1.015 1.005 - 1.030      Blood MODERATE (A) NEGATIVE,Negative      pH (UA) 6.0 5.0 - 9.0      Protein 30 (A) NEGATIVE,Negative mg/dl    Urobilinogen 0.2 0.0 - 1.0 mg/dl    Nitrites POSITIVE (A) NEGATIVE,Negative      Leukocyte Esterase LARGE (A) NEGATIVE,Negative     CBC WITH AUTOMATED DIFF   Result Value Ref Range    WBC 14.8 (H) 4.0 - 11.0 1000/mm3    RBC 4.44 3.60 - 5.20 M/uL    HGB 12.5 (L) 13.0 - 17.2 gm/dl    HCT 37.7 37.0 - 50.0 %    MCV 84.9 80.0 - 98.0 fL    MCH 28.2 25.4 - 34.6 pg    MCHC 33.2 30.0 - 36.0 gm/dl    PLATELET 254 559 - 625 1000/mm3    MPV 10.3 (H) 6.0 - 10.0 fL    RDW-SD 47.6 (H) 36.4 - 46.3      NRBC 0 0 - 0      IMMATURE GRANULOCYTES 0.5 0.0 - 3.0 %    NEUTROPHILS 88.6 (H) 34 - 64 %    LYMPHOCYTES 5.0 (L) 28 - 48 %    MONOCYTES 5.5 1 - 13 %    EOSINOPHILS 0.1 0 - 5 %    BASOPHILS 0.3 0 - 3 %   METABOLIC PANEL, BASIC   Result Value Ref Range    Potassium 3.7 3.5 - 5.1 mEq/L    Chloride 100 98 - 107 mEq/L    Sodium 130 (L) 136 - 145 mEq/L    CO2 19 (L) 20 - 31 mEq/L    Glucose 159 (H) 74 - 106 mg/dl    BUN 13 9 - 23 mg/dl    Creatinine 0.82 0.55 - 1.02 mg/dl    GFR est AA >60.0      GFR est non-AA >60      Calcium 9.5 8.7 - 10.4 mg/dl    Anion gap 11 5 - 15 mmol/L   CBC WITH AUTOMATED DIFF   Result Value Ref Range    WBC 11.8 (H) 4.0 - 11.0 1000/mm3    RBC 4.22 3.60 - 5.20 M/uL    HGB 11.9 (L) 13.0 - 17.2 gm/dl    HCT 35.8 (L) 37.0 - 50.0 %    MCV 84.8 80.0 - 98.0 fL    MCH 28.2 25.4 - 34.6 pg    MCHC 33.2 30.0 - 36.0 gm/dl    PLATELET 448 059 - 170 1000/mm3    MPV 10.1 (H) 6.0 - 10.0 fL    RDW-SD 47.1 (H) 36.4 - 46.3      NRBC 0 0 - 0      IMMATURE GRANULOCYTES 0.4 0.0 - 3.0 %    NEUTROPHILS 81.7 (H) 34 - 64 %    LYMPHOCYTES 8.9 (L) 28 - 48 %    MONOCYTES 8.5 1 - 13 %    EOSINOPHILS 0.2 0 - 5 %    BASOPHILS 0.3 0 - 3 %   METABOLIC PANEL, BASIC   Result Value Ref Range    Potassium 3.5 3.5 - 5.1 mEq/L    Chloride 102 98 - 107 mEq/L    Sodium 133 (L) 136 - 145 mEq/L    CO2 21 20 - 31 mEq/L    Glucose 122 (H) 74 - 106 mg/dl    BUN 20 9 - 23 mg/dl    Creatinine 0.93 0.55 - 1.02 mg/dl    GFR est AA >60.0      GFR est non-AA >60      Calcium 9.1 8.7 - 10.4 mg/dl    Anion gap 10 5 - 15 mmol/L   CBC WITH AUTOMATED DIFF   Result Value Ref Range    WBC 9.4 4.0 - 11.0 1000/mm3    RBC 3.76 3.60 - 5.20 M/uL    HGB 10.5 (L) 13.0 - 17.2 gm/dl    HCT 32.2 (L) 37.0 - 50.0 %    MCV 85.6 80.0 - 98.0 fL    MCH 27.9 25.4 - 34.6 pg    MCHC 32.6 30.0 - 36.0 gm/dl    PLATELET 752 845 - 650 1000/mm3    MPV 10.6 (H) 6.0 - 10.0 fL    RDW-SD 48.8 (H) 36.4 - 46.3      NRBC 0 0 - 0      IMMATURE GRANULOCYTES 0.5 0.0 - 3.0 %    NEUTROPHILS 77.4 (H) 34 - 64 %    LYMPHOCYTES 11.1 (L) 28 - 48 %    MONOCYTES 9.6 1 - 13 %    EOSINOPHILS 0.8 0 - 5 %    BASOPHILS 0.6 0 - 3 %   METABOLIC PANEL, BASIC   Result Value Ref Range    Potassium 3.4 (L) 3.5 - 5.1 mEq/L    Chloride 103 98 - 107 mEq/L    Sodium 134 (L) 136 - 145 mEq/L    CO2 20 20 - 31 mEq/L    Glucose 103 74 - 106 mg/dl    BUN 24 (H) 9 - 23 mg/dl    Creatinine 1.12 (H) 0.55 - 1.02 mg/dl    GFR est AA 60.0      GFR est non-AA 50      Calcium 8.7 8.7 - 10.4 mg/dl    Anion gap 11 5 - 15 mmol/L   CBC WITH AUTOMATED DIFF   Result Value Ref Range    WBC 12.0 (H) 4.0 - 11.0 1000/mm3    RBC 4.03 3.60 - 5.20 M/uL    HGB 11.0 (L) 13.0 - 17.2 gm/dl    HCT 34.4 (L) 37.0 - 50.0 %    MCV 85.4 80.0 - 98.0 fL    MCH 27.3 25.4 - 34.6 pg    MCHC 32.0 30.0 - 36.0 gm/dl    PLATELET 300 798 - 179 1000/mm3    MPV 10.5 (H) 6.0 - 10.0 fL    RDW-SD 47.3 (H) 36.4 - 46.3      NRBC 0 0 - 0      IMMATURE GRANULOCYTES 1.1 0.0 - 3.0 %    NEUTROPHILS 76.4 (H) 34 - 64 %    LYMPHOCYTES 11.5 (L) 28 - 48 %    MONOCYTES 7.7 1 - 13 %    EOSINOPHILS 2.6 0 - 5 %    BASOPHILS 0.7 0 - 3 %   METABOLIC PANEL, BASIC   Result Value Ref Range    Potassium 3.9 3.5 - 5.1 mEq/L    Chloride 97 (L) 98 - 107 mEq/L    Sodium 131 (L) 136 - 145 mEq/L    CO2 24 20 - 31 mEq/L    Glucose 108 (H) 74 - 106 mg/dl    BUN 16 9 - 23 mg/dl    Creatinine 0.77 0.55 - 1.02 mg/dl    GFR est AA >60.0      GFR est non-AA >60      Calcium 9.2 8.7 - 10.4 mg/dl    Anion gap 10 5 - 15 mmol/L   POC URINE MICROSCOPIC   Result Value Ref Range    Epithelial cells, squamous 5-9 /LPF    WBC TNTC /HPF    RBC 1-4 /HPF    Bacteria 3+ /HPF   GLUCOSE, POC   Result Value Ref Range    Glucose (POC) 152 (H) 65 - 105 mg/dL   EKG, 12 LEAD, INITIAL   Result Value Ref Range    Ventricular Rate 67 BPM    Atrial Rate 66 BPM    QRS Duration 82 ms    Q-T Interval 422 ms    QTC Calculation (Bezet) 445 ms    Calculated R Axis -17 degrees    Calculated T Axis -16 degrees    Diagnosis       Accelerated Junctional rhythm  Inferior infarct , age undetermined  Abnormal ECG  When compared with ECG of 23-MAY-2013 13:15,  Junctional rhythm has replaced Sinus rhythm  Inferior infarct is now present  Inverted T waves have replaced nonspecific T wave abnormality in Inferior   leads  Confirmed by Gagan Julio M.D., Beatriz Davey ([id number]) on 9/18/2022 3:53:56 PM            Active Problems:     Patient Active Problem List    Diagnosis    Hip fracture ([facility] [address]    Late onset Alzheimer's dementia with behavioral disturbance ([facility] [address]     And vascular dementia as well, Dr Burgess Andrade      CHD (coronary heart disease)    Cancer of overlapping sites of left female breast [facility])    Primary hypertension    Tobacco dependence syndrome    Advance directive discussed with patient    Arthritis, degenerative    Gastroesophageal reflux disease without esophagitis    Dyslipidemia    COPD on films    Osteoporosis Dr. Nori Andrews    Monoclonal gammopathy 2011 Dr. Darci Dill:     Diagnoses and all orders for this visit:    1. Weakness generalized  Assessment & Plan:   Recent electrolytes, kidney function within acceptable range. Hemoglobin within acceptable range. To get B12 level. Recommendations for assisted living to keep her involved in activities suitable for her age/ condition. Orders:  -     VITAMIN B12; Future    2. Closed fracture of left hip with routine healing, subsequent encounter  Assessment & Plan:   Status post hip fracture, surgical repair. Pain under control without medication. Surgical wound healed fine. Following up with orthopedics on 10/24    Orders:  -     VITAMIN D, 25 HYDROXY; Future    3. Coronary artery disease due to calcified coronary lesion  Assessment & Plan:  Stable current medication      4. Essential hypertension      Follow-up and Dispositions    Return if symptoms worsen or fail to improve. Disclaimer: The patient understands our medical plan. Alternatives have been explained and offered. The risks, benefits and significant side effects of all medications have been reviewed. Anticipated time course and progression of condition reviewed. All questions have been addressed. She is encouraged to employ the information provided in the after visit summary, which was reviewed. Where applicable, she is instructed to call the clinic if she has not been notified either by phone or through [address] with the results of her tests or with an appointment plan for any referrals within 1 week(s). No news is not good news; it's no news.  The patient  is to call if her condition worsens or fails to improve or if significant side effects are experienced.            Ilsa Cross MD

## 2022-10-13 NOTE — ASSESSMENT & PLAN NOTE
Status post hip fracture, surgical repair. Pain under control without medication. Surgical wound healed fine.   Following up with orthopedics on 10/24

## 2022-10-13 NOTE — TELEPHONE ENCOUNTER
[facility] came by and dropped off a referral form , requesting for Dr. Silvina Ramirez to sign as soon as possible. They are aware that Dr. Silvina Ramirez is out of the office until Monday 10/17/22. Form placed in box.  Thank you

## 2022-10-13 NOTE — ASSESSMENT & PLAN NOTE
Recent electrolytes, kidney function within acceptable range. Hemoglobin within acceptable range. To get B12 level. Recommendations for assisted living to keep her involved in activities suitable for her age/ condition.

## 2022-10-14 ENCOUNTER — TELEPHONE (OUTPATIENT)
Dept: INTERNAL MEDICINE CLINIC | Age: 82
End: 2022-10-14

## 2022-10-14 DIAGNOSIS — R53.1 WEAKNESS GENERALIZED: Primary | ICD-10-CM

## 2022-10-14 RX ORDER — LACTOSE-REDUCED FOOD 0.04G-1/ML
1 LIQUID (ML) ORAL 2 TIMES DAILY
Qty: 60 EACH | Refills: 0 | Status: SHIPPED | OUTPATIENT
Start: 2022-10-14

## 2022-10-14 NOTE — TELEPHONE ENCOUNTER
Roxanna from [facility] called stating pt was seen yesterday  (dr Chepe Weinstein )  at [facility] pt was advised to discontinue Calcium Carbonate and Pantoprazole    Teodoro Mata needs an order for pt to discontinue them and would also need an order for pt to take boost   Good phone # [phone]  Fax #  [phone]

## 2022-10-14 NOTE — TELEPHONE ENCOUNTER
Be misunderstanding. Patient needs to continue on all the current medications. Placed order for boost, prescription printed. To be sent to the facility.   Thanks

## 2022-10-14 NOTE — PROGRESS NOTES
Please inform the patient/son that vitamin B12 and vitamin D levels are within acceptable range.   Thanks

## 2022-10-17 ENCOUNTER — TELEPHONE (OUTPATIENT)
Dept: INTERNAL MEDICINE CLINIC | Age: 82
End: 2022-10-17

## 2022-10-17 NOTE — TELEPHONE ENCOUNTER
----- Message from Shannon Zapien MD sent at 10/14/2022  8:05 AM EDT -----  Please inform the patient/son that vitamin B12 and vitamin D levels are within acceptable range.   Thanks

## 2022-10-18 ENCOUNTER — TELEPHONE (OUTPATIENT)
Dept: INTERNAL MEDICINE CLINIC | Age: 82
End: 2022-10-18

## 2022-10-18 NOTE — TELEPHONE ENCOUNTER
Syed Villa is calling from [facility] stating they just received an order from us. They are needing her last 2 office notes.     Fax: [phone]

## 2022-10-19 ENCOUNTER — PATIENT OUTREACH (OUTPATIENT)
Dept: CASE MANAGEMENT | Age: 82
End: 2022-10-19

## 2022-10-19 NOTE — PROGRESS NOTES
Patient has graduated from the Transitions of Care Coordination  program on 10/19/2022. Patient's symptoms are stable at this time. Patient/family has the ability to self-manage. Care management goals have been completed at this time. No further care transitions nurse follow up scheduled. Goals Addressed                   This Visit's Progress     COMPLETED: Prevent complications post hospitalization. CTN will monitor X 4 weeks    Ensure provider appt is scheduled within 7 days post-discharge 10/3/2022 Patient has no appt at present. Will place on list.     Confirm patient attended post-discharge provider apt    Complete post-visit call to confirm attendance and update care needs 10/3/2022 Patient is doing well. She is trying to get up and move about as that is what she is used to. Needs to heal. 10/10/2022 patient is doing ok. She fell at assisted living the other day and is hurting. 10/19/2022 Patient is doing ok. She is going to be assessed for hospice care. Review/educate common or potential \"red flags\" of condition worsening 10/3/2022 Reviewed signs/symptoms of hip surgery such as stiffness, pain, swelling, and signs of infection to name a few. Evaluate adherence to medications and priority barriers to resolve 10/3/2022 Patient has all meds and is taking as directed. 10/10/2022 patient has meds and is taking as instructed. 10/19/2022 Patient has all meds and is taking as she should. Instruct on adherence to medications as ordered and assess for therapeutic response and side-effects  10/3/2022 Staff at assisted living know why patient is taking meds and know when to contact physician for any issues. 10/10/2022 Patient has no concerns about meds at present. 10/19/2022 No problems with meds        Discuss and evaluate ADL performance. Provide recommendations on energy conservation, particularly related to transition home from an inpatient admission.  10/3/2022 Patient is doing what she can. She wants to get up and go but has to allow hip to heal a little more. 10/10/2022 Patient is doing ok. She is trying to get up slowly. She rests as needed. Still in pain. 10/19/2022 Patient had another fall at SNF and back to hospital. She moves as she can and rests when needed. Patient has care transitions nurse contact information for any further questions, concerns, or needs.   Patient's upcoming visits:    Future Appointments   Date Time Provider Milady Linares   11/18/2022 10:00 AM [facility] LAB VISIT [facility] BS AMB   12/2/2022 11:00 AM Maryana Fair MD [facility] BS AMB

## 2022-11-01 ENCOUNTER — TELEPHONE (OUTPATIENT)
Dept: INTERNAL MEDICINE CLINIC | Age: 82
End: 2022-11-01

## 2022-11-01 NOTE — TELEPHONE ENCOUNTER
Michael Mason with [facility] asking about Plan of Care faxed to the office about 2 weeks ago     Stated she will fax again today.

## 2023-01-07 PROBLEM — N30.01 ACUTE CYSTITIS WITH HEMATURIA: Status: ACTIVE | Noted: 2023-01-07

## 2023-01-07 PROBLEM — G93.41 METABOLIC ENCEPHALOPATHY: Status: ACTIVE | Noted: 2023-01-07

## 2023-01-07 PROBLEM — N32.0 BLADDER OUTLET OBSTRUCTION: Status: ACTIVE | Noted: 2023-01-07

## 2023-01-07 PROBLEM — E86.0 DEHYDRATION: Status: ACTIVE | Noted: 2023-01-07

## 2023-01-10 PROBLEM — E44.0 MODERATE PROTEIN-CALORIE MALNUTRITION (HCC): Status: ACTIVE | Noted: 2023-01-10

## 2023-01-12 ENCOUNTER — TELEPHONE (OUTPATIENT)
Dept: INTERNAL MEDICINE CLINIC | Age: 83
End: 2023-01-12

## 2023-01-12 ENCOUNTER — PATIENT OUTREACH (OUTPATIENT)
Dept: CASE MANAGEMENT | Age: 83
End: 2023-01-12

## 2023-01-12 NOTE — TELEPHONE ENCOUNTER
Sorry but unable to give any orders to the nursing home as we don't have privileges there.   The attending there should be able to

## 2023-01-12 NOTE — TELEPHONE ENCOUNTER
Edvin Mason is calling from [facility] stating she saw her today for start of care. She will see her for 2 weeks and then she follow up with urology. PT and OT will eval her.

## 2023-01-12 NOTE — TELEPHONE ENCOUNTER
Patient's daughter, Betty Hester, is calling stating her Mother lives in a nursing home and wants to know if Dr. Yulia Cotton can order bed rails and a floor mat to put next to her bed to prevent her from falling.

## 2023-01-12 NOTE — PROGRESS NOTES
Care Transitions Initial Call    Call within 2 business days of discharge: Yes     Patient: Tiffany Conley Patient : 1940 MRN: [id number]    Last Discharge [address]       Date Complaint Diagnosis Description Type Department Provider    23 Vaginal Bleeding Acute cystitis with hematuria . .. ED to Hosp-Admission (Discharged) (ADMIT) Sandeep Heck MD; Mehdi Horotn. .. Was this an external facility discharge? Yes, 23-23  Discharge Facility: [facility] for Gross hematuria, Sepsis, Complicated urinary tract infection. Challenges to be reviewed by the provider   Additional needs identified to be addressed with provider: no  none         Method of communication with provider : none    Discussed COVID-19 related testing which was available at this time. Test results were negative. Advance Care Planning:   Does patient have an Advance Directive:  none at this time . Inpatient Readmission Risk score: Unplanned Readmit Risk Score: 16.2    Was this a readmission? no     Patients top risk factors for readmission:  chronic medical conditions. CTN attempt to reach Pt. On phone. CTN spoke with Ms. Mary Rodas of [facility].   Ms. Mary Rodas informed CTN that  Pt. Is a resident of [facility]. Ms. Arciniega informed CTN that Home health is currently with Pt. At this time. Ms. Priscilla Washburn states that Pt. Does not have phone in their rooms. Ms. Priscilla Washburn states that she can give CTN information to Pt. CTN office contact information given to Ms. Arciniega. Ms. Priscilla Washburn states that there are times Pt.can't speak. Ms. Priscilla Washburn kept the conversation short. CTN did not release any HIPAA protected information and solely obtained information from Ms. Mary Rodas Staff at [facility].     CTN attempt to contact patient daughter Ms. Love Vail on phone. Ms. Love Vail is listed on Pt. PHI. Patient's daughter  reported that Pt. Is doing well today. Ms. Laura Carty reported that Pt. Lives in a living facility. Pt. Daughter reported that The facility staff handles Pt. medications. Pt. Daughter states that Pt. PCP is [facility]. Offer appt. Pt. Daughter prefers to schedule Pt. PCP appt. Pt. Daughter states that she will make appt. With Pt. Urology Dr. Dixon Hernandez. Daughter reported that Pt. Can't speak complete sentences. Pt. Daughter reported that depending on a day, Pt. Can say yes or no. No questions, concerns and/or needs at this time as per Pt. Daughter. Pt. Daughter states that facility has provider. Pt. Daughter reminded to contact Dr. Jesus Oneal office for any questions or concerns. Pt. Daughter states that she will do so. CTN called [facility]. CTN spoke with Ms. Susanna Rodríguez on phone. Nevin Azevedo states that Pt. Is for LTC and nurses provides 24/7 care.  kept the conversation. CTN did not release any HIPAA protected information and solely obtained information from Ms. Susanna Rodríguez Staff at [facility].       Home Health/Outpatient orders at discharge: home health care  Date of initial visit: 1/12/23 as per Ms. Shoshana Spurling.

## 2023-01-18 ENCOUNTER — PATIENT OUTREACH (OUTPATIENT)
Dept: CASE MANAGEMENT | Age: 83
End: 2023-01-18

## 2023-01-18 NOTE — PROGRESS NOTES
Care Transitions Follow Up Call    Challenges to be reviewed by the provider   Additional needs identified to be addressed with provider: no  none           Method of communication with provider : none    CTN reached patient daughter Ms. Darío Pinedo on phone. Ms. Darío Pinedo is listed on Pt. PHI. Patient's daughter  reported that Pt. Is doing better. No new or worsening of symptoms reported by Pt. Daughter at this time. Pt. Daughter reported that Pt has Urology appt. Tomorrow. Pt. Daughter states that she is going to assisted living facility tomorrow and she will ask for bedrails and floor shemar. CTN advised Pt. To contact Dr. Favian Miller office if the facility does not have a provider on staff. Pt. Daughter states that she will do so. Pt. Daughter informed CTN that starting 2/1/23, Pt. Is going to live with Pt. Son and Pt. Son will hire a private nurse. No questions, concerns and/or needs at this time as per Pt. Daughter.      [facility] follow up appointment(s):   Future Appointments   Date Time Provider Milady Linares   1/19/2023  8:20 AM [facility]

## 2023-01-25 ENCOUNTER — PATIENT OUTREACH (OUTPATIENT)
Dept: CASE MANAGEMENT | Age: 83
End: 2023-01-25

## 2023-01-25 NOTE — PROGRESS NOTES
Care Transitions Follow Up Call    Challenges to be reviewed by the provider   Additional needs identified to be addressed with provider:     Patient daughter reported that  She asked the facility about bedrails and floor shemar but was denied because Pt. Is not using in house provider. Pt. Daughter states that Pt. Will move out of facility on March 1. Method of communication with provider : chart routing      CTN reached patient daughter Ms. Angeline Daniels on phone. Ms. Angeline Daniels is listed on Pt. PHI. Patient's daughter  reported that Pt. Is doing good. Urology appt. Went well as per Pt. Daughter. Pt. Daughter reported that Pt. Catheter was removed. Pt. Daughter states that Pt. Urology Dr. Murrell Religion more testing. Patient daughter reported that  She asked the facility about bedrails and floor shemar order but was denied because Pt. Is not using in house provider. Pt. Daughter states that Pt. Will move out of facility on March 1 . No questions, concerns and/or needs at this time as per Pt. Daughter.      [address] follow up appointment(s):   Future Appointments   Date Time Provider Milady Linares   1/28/2023  9:30 AM [facility] CT 1 [facility]   3/7/2023  2:00 PM Mitra Bauman MD [facility]

## 2023-02-03 ENCOUNTER — PATIENT OUTREACH (OUTPATIENT)
Dept: CASE MANAGEMENT | Age: 83
End: 2023-02-03

## 2023-02-03 NOTE — PROGRESS NOTES
Care Transitions Follow Up Call    Challenges to be reviewed by the provider   Additional needs identified to be addressed with provider: no  none           Method of communication with provider : none    Care Transition Nurse (CTN) contacted the patient by telephone for follow up . Verified name and  with patient as identifiers. CTN reached patient daughter Ms. Yulia Oakley on phone. Ms. Yulia Oakley is listed on Pt. PHI. Patient's daughter  reported that Pt. Is doing good. No new or worsening of symptoms reported by Pt. Daughter at this time. No questions, concerns and/or needs at this time as per Pt. Daughter. Pt. Daughter reported that Pt. . Is still at the facility.

## 2023-02-06 PROBLEM — E86.0 DEHYDRATION: Status: RESOLVED | Noted: 2023-01-07 | Resolved: 2023-02-06

## 2023-02-10 ENCOUNTER — PATIENT OUTREACH (OUTPATIENT)
Dept: CASE MANAGEMENT | Age: 83
End: 2023-02-10

## 2023-02-10 NOTE — PROGRESS NOTES
Care Transitions Follow Up Call      CTN Attempt to contact patient via telephone on 2/10/23 for  follow up. Unable to reach. Left message on voicemail with office contact information. No Patient medical information left on message.

## 2023-02-20 ENCOUNTER — TELEPHONE (OUTPATIENT)
Age: 83
End: 2023-02-20

## 2023-02-20 DIAGNOSIS — R60.9 EDEMA, UNSPECIFIED TYPE: ICD-10-CM

## 2023-02-20 DIAGNOSIS — R60.9 EDEMA, UNSPECIFIED TYPE: Primary | ICD-10-CM

## 2023-02-20 RX ORDER — FUROSEMIDE 20 MG/1
20 TABLET ORAL DAILY
Qty: 30 TABLET | Refills: 1 | Status: CANCELLED | OUTPATIENT
Start: 2023-02-20

## 2023-02-20 RX ORDER — FUROSEMIDE 20 MG/1
20 TABLET ORAL DAILY PRN
COMMUNITY
Start: 2022-09-15 | End: 2023-02-20 | Stop reason: SDUPTHER

## 2023-02-20 RX ORDER — FUROSEMIDE 20 MG/1
20 TABLET ORAL DAILY
Qty: 30 TABLET | Refills: 1 | Status: SHIPPED | OUTPATIENT
Start: 2023-02-20

## 2023-02-20 RX ORDER — POTASSIUM CHLORIDE 750 MG/1
10 TABLET, FILM COATED, EXTENDED RELEASE ORAL DAILY
Qty: 30 TABLET | Refills: 1 | Status: SHIPPED | OUTPATIENT
Start: 2023-02-20

## 2023-02-20 RX ORDER — POTASSIUM CHLORIDE 750 MG/1
10 TABLET, FILM COATED, EXTENDED RELEASE ORAL DAILY
COMMUNITY
End: 2023-02-20 | Stop reason: SDUPTHER

## 2023-02-20 NOTE — TELEPHONE ENCOUNTER
Daughter calling says moms foot is very swollen. She is out of the lasix they gave her when she had her hip surgery. Wants to know if you can refill the rx to [facility] and also send and order to [address] [place] [address] so they will give her the med.     Wants call when done so she can go     Needs asap    Lasix 20 mg

## 2023-02-22 ENCOUNTER — CARE COORDINATION (OUTPATIENT)
Facility: CLINIC | Age: 83
End: 2023-02-22

## 2023-02-22 NOTE — CARE COORDINATION
[facility] Care Transitions Follow Up Call    Patient Current Location:  [place]    Patient: Brianna Castro  Patient : 1940   MRN: [id number]  Reason for Admission: Gross hematuria, Sepsis, Complicated urinary tract infection. Discharge Date: 23 RARS: No data recorded    Needs to be reviewed by the provider   Additional needs identified to be addressed with provider: No  none             Method of communication with provider: none. CTN reached patient daughter Ms. Vimal Bazan on phone. Ms. Vimal Bazan is listed on Pt. PHI. Patient's daughter  reported that Pt. Is doing good except her foot is swelling. Pt. Daughter reported that it's  not worsening and patient PCP is aware. Pt. Daughter reported that she picked up Pt. lasix and Pt. Is taking lasix now. Pt. Daughter denied Pt. Having CP, SOB and cough at this time. Pt. Daughter reported that Pt. Is still in the facility. Reminded Pt. Daughter to call Dr. Jamir Barber office for bed rails and floor akilah order prior to Patient leaving the facility. Pt. Daughter states that she will do so . No questions, concerns and/or needs at this time as per Pt. Daughter. Future Appointments   Date Time Provider Kenrick Valera   3/7/2023  2:00 PM Vipul Fields MD [facility] Sade Shin     [facility] follow up appointment(s): none noted at this time. Care Transition Nurse reviewed medical action plan with  Pt. Daughter   and discussed any barriers to care and/or understanding of plan of care after discharge. Discussed appropriate site of care based on symptoms and resources available to patient including: PCP  When to call 911. The  Pt. Daughter   agrees to contact the PCP office for questions related to their healthcare. Care Transitions Subsequent and Final Call    Subsequent and Final Calls  Care Transitions Interventions  Other Interventions:             Care Transition Nurse provided contact information for future needs.  Plan for follow-up call in 3-5 days based on severity of symptoms and risk factors. Plan for next call:  follow up on Pt. Foot swelling. Assess other symptoms, assess for any needs.      Charli Allan, RN

## 2023-02-28 ENCOUNTER — TELEPHONE (OUTPATIENT)
Age: 83
End: 2023-02-28

## 2023-02-28 RX ORDER — ATORVASTATIN CALCIUM 80 MG/1
80 TABLET, FILM COATED ORAL DAILY
Qty: 90 TABLET | Refills: 1 | Status: SHIPPED | OUTPATIENT
Start: 2023-02-28

## 2023-02-28 RX ORDER — PANTOPRAZOLE SODIUM 40 MG/1
40 TABLET, DELAYED RELEASE ORAL
Qty: 90 TABLET | Refills: 1 | Status: SHIPPED | OUTPATIENT
Start: 2023-02-28

## 2023-02-28 RX ORDER — EZETIMIBE 10 MG/1
10 TABLET ORAL DAILY
Qty: 90 TABLET | Refills: 1 | Status: SHIPPED | OUTPATIENT
Start: 2023-02-28

## 2023-02-28 RX ORDER — AMLODIPINE BESYLATE 5 MG/1
5 TABLET ORAL DAILY
Qty: 90 TABLET | Refills: 1 | Status: SHIPPED | OUTPATIENT
Start: 2023-02-28

## 2023-02-28 NOTE — TELEPHONE ENCOUNTER
Patient called and needs a medication refill  amLODIPine (NORVASC) 5 MG tablet     atorvastatin (LIPITOR) 80 MG tablet     ezetimibe (ZETIA) 10 MG tablet     metoprolol tartrate (LOPRESSOR) 25   MG   pantoprazole (PROTONIX) 40 MG     tiotropium (SPIRIVA) 18 MCG inhalation capsule   Please advise.

## 2023-03-01 ENCOUNTER — CARE COORDINATION (OUTPATIENT)
Facility: CLINIC | Age: 83
End: 2023-03-01

## 2023-03-01 NOTE — CARE COORDINATION
Care Transitions Outreach Attempt    Ctn  attempt to contact patient via telephone on 3/1/23 for  follow up. Unable to reach. Left message on voicemail with office contact information. No Patient medical/health  information left on message. This episode is closed and resolved.      [facility] follow up appointment(s):   Future Appointments   Date Time Provider Kenrick Valera   3/7/2023  2:00 PM Adeline Waters MD [facility] Cristy Deras   4/7/2023 11:00 AM Cole Herrera MD [facility] BS AMB

## 2023-03-10 NOTE — TELEPHONE ENCOUNTER
Requested prescriptions:    - amLODIPine (NORVASC) 5 MG tablet    - atorvastatin (LIPITOR) 80 MG tablet    - ezetimibe (ZETIA) 10 MG tablet    - metoprolol tartrate (LOPRESSOR) 25   MG   -pantoprazole (PROTONIX) 40 MG    - tiotropium (SPIRIVA) 18 MCG inhalation capsule     Pharmacy: [address] [place] [address] pharmacy    Patient's daughter called and states these were sent to [facility]; however, when they went to get them filled it ended up being really expensive. She is requesting for these to go to [facility] pharmacy instead?     Please advise, thank you

## 2023-03-13 NOTE — TELEPHONE ENCOUNTER
Patient requesting pharmacy change due to cost.     PCP: Mary Garcia MD    Last appt: [unfilled]  Future Appointments   Date Time Provider Kenrick Valera   4/7/2023 11:00 AM Arlene Sage MD [facility] BS AMB       Requested Prescriptions     Pending Prescriptions Disp Refills    amLODIPine (NORVASC) 5 MG tablet 90 tablet 1     Sig: Take 1 tablet by mouth daily    atorvastatin (LIPITOR) 80 MG tablet 90 tablet 1     Sig: Take 1 tablet by mouth daily    ezetimibe (ZETIA) 10 MG tablet 90 tablet 1     Sig: Take 1 tablet by mouth daily    metoprolol tartrate (LOPRESSOR) 25 MG tablet 180 tablet 1     Sig: Take 1 tablet by mouth 2 times daily    pantoprazole (PROTONIX) 40 MG tablet 90 tablet 1     Sig: Take 1 tablet by mouth every morning (before breakfast)    tiotropium (SPIRIVA) 18 MCG inhalation capsule 30 capsule 5     Sig: Inhale 1 capsule into the lungs daily

## 2023-03-14 RX ORDER — ATORVASTATIN CALCIUM 80 MG/1
80 TABLET, FILM COATED ORAL DAILY
Qty: 90 TABLET | Refills: 1 | Status: SHIPPED | OUTPATIENT
Start: 2023-03-14

## 2023-03-14 RX ORDER — AMLODIPINE BESYLATE 5 MG/1
5 TABLET ORAL DAILY
Qty: 90 TABLET | Refills: 1 | Status: SHIPPED | OUTPATIENT
Start: 2023-03-14

## 2023-03-14 RX ORDER — EZETIMIBE 10 MG/1
10 TABLET ORAL DAILY
Qty: 90 TABLET | Refills: 1 | Status: SHIPPED | OUTPATIENT
Start: 2023-03-14

## 2023-03-14 RX ORDER — PANTOPRAZOLE SODIUM 40 MG/1
40 TABLET, DELAYED RELEASE ORAL
Qty: 90 TABLET | Refills: 1 | Status: SHIPPED | OUTPATIENT
Start: 2023-03-14

## 2023-04-02 DIAGNOSIS — R60.9 EDEMA, UNSPECIFIED TYPE: ICD-10-CM

## 2023-04-04 DIAGNOSIS — R60.9 EDEMA, UNSPECIFIED TYPE: ICD-10-CM

## 2023-04-06 RX ORDER — FUROSEMIDE 20 MG/1
20 TABLET ORAL DAILY
Qty: 30 TABLET | Refills: 1 | OUTPATIENT
Start: 2023-04-06

## 2023-04-06 RX ORDER — FUROSEMIDE 20 MG/1
20 TABLET ORAL DAILY
Qty: 30 TABLET | Refills: 1 | Status: SHIPPED | OUTPATIENT
Start: 2023-04-06

## 2023-04-06 RX ORDER — POTASSIUM CHLORIDE 750 MG/1
10 TABLET, FILM COATED, EXTENDED RELEASE ORAL DAILY
Qty: 30 TABLET | Refills: 1 | OUTPATIENT
Start: 2023-04-06

## 2023-04-06 RX ORDER — POTASSIUM CHLORIDE 750 MG/1
10 TABLET, FILM COATED, EXTENDED RELEASE ORAL DAILY
Qty: 30 TABLET | Refills: 1 | Status: SHIPPED | OUTPATIENT
Start: 2023-04-06

## 2023-04-07 ENCOUNTER — HOSPITAL ENCOUNTER (OUTPATIENT)
Facility: HOSPITAL | Age: 83
Setting detail: SPECIMEN
End: 2023-04-07
Payer: MEDICARE

## 2023-04-07 DIAGNOSIS — R60.9 EDEMA, UNSPECIFIED TYPE: ICD-10-CM

## 2023-04-07 LAB
ALBUMIN SERPL-MCNC: 3.6 G/DL (ref 3.4–5)
ALBUMIN/GLOB SERPL: 1 (ref 0.8–1.7)
ALP SERPL-CCNC: 122 U/L (ref 45–117)
ALT SERPL-CCNC: 32 U/L (ref 13–56)
ANION GAP SERPL CALC-SCNC: 6 MMOL/L (ref 3–18)
AST SERPL-CCNC: 22 U/L (ref 10–38)
BILIRUB SERPL-MCNC: 0.4 MG/DL (ref 0.2–1)
BUN SERPL-MCNC: 46 MG/DL (ref 7–18)
BUN/CREAT SERPL: 39 (ref 12–20)
CALCIUM SERPL-MCNC: 9.9 MG/DL (ref 8.5–10.1)
CHLORIDE SERPL-SCNC: 106 MMOL/L (ref 100–111)
CO2 SERPL-SCNC: 27 MMOL/L (ref 21–32)
CREAT SERPL-MCNC: 1.19 MG/DL (ref 0.6–1.3)
ERYTHROCYTE [DISTWIDTH] IN BLOOD BY AUTOMATED COUNT: 17 % (ref 11.6–14.5)
GLOBULIN SER CALC-MCNC: 3.6 G/DL (ref 2–4)
GLUCOSE SERPL-MCNC: 95 MG/DL (ref 74–99)
HCT VFR BLD AUTO: 38.5 % (ref 35–45)
HGB BLD-MCNC: 11.9 G/DL (ref 12–16)
MCH RBC QN AUTO: 27.7 PG (ref 24–34)
MCHC RBC AUTO-ENTMCNC: 30.9 G/DL (ref 31–37)
MCV RBC AUTO: 89.5 FL (ref 78–100)
NRBC # BLD: 0 K/UL (ref 0–0.01)
NRBC BLD-RTO: 0 PER 100 WBC
PLATELET # BLD AUTO: 283 K/UL (ref 135–420)
PMV BLD AUTO: 11.1 FL (ref 9.2–11.8)
POTASSIUM SERPL-SCNC: 4.7 MMOL/L (ref 3.5–5.5)
PROT SERPL-MCNC: 7.2 G/DL (ref 6.4–8.2)
RBC # BLD AUTO: 4.3 M/UL (ref 4.2–5.3)
SODIUM SERPL-SCNC: 139 MMOL/L (ref 136–145)
T4 FREE SERPL-MCNC: 0.9 NG/DL (ref 0.7–1.5)
TSH SERPL DL<=0.05 MIU/L-ACNC: 1.4 UIU/ML (ref 0.36–3.74)
WBC # BLD AUTO: 8.5 K/UL (ref 4.6–13.2)

## 2023-04-07 PROCEDURE — 84439 ASSAY OF FREE THYROXINE: CPT

## 2023-04-07 PROCEDURE — 85027 COMPLETE CBC AUTOMATED: CPT

## 2023-04-07 PROCEDURE — 80053 COMPREHEN METABOLIC PANEL: CPT

## 2023-04-07 PROCEDURE — 36415 COLL VENOUS BLD VENIPUNCTURE: CPT

## 2023-04-10 PROBLEM — E44.0 MODERATE PROTEIN-CALORIE MALNUTRITION (HCC): Status: RESOLVED | Noted: 2023-01-10 | Resolved: 2023-04-10

## 2023-04-10 PROBLEM — R53.1 WEAKNESS GENERALIZED: Status: RESOLVED | Noted: 2022-10-13 | Resolved: 2023-04-10

## 2023-04-10 PROBLEM — N30.01 ACUTE CYSTITIS WITH HEMATURIA: Status: RESOLVED | Noted: 2023-01-07 | Resolved: 2023-04-10

## 2023-04-10 PROBLEM — G93.41 METABOLIC ENCEPHALOPATHY: Status: RESOLVED | Noted: 2023-01-07 | Resolved: 2023-04-10

## 2023-04-18 NOTE — TELEPHONE ENCOUNTER
Patient requesting to change to [facility] for 90 day supply. Pended the requested medications.      PCP: Miryam Ge MD    Last appt: [unfilled]  Future Appointments   Date Time Provider Kenrick Valera   4/5/2024 11:05 AM [facility] LAB VISIT [facility] BS AMB   4/12/2024 11:00 AM Cindy Bolanos MD [facility] BS AMB       Requested Prescriptions     Pending Prescriptions Disp Refills    pantoprazole (PROTONIX) 40 MG tablet 90 tablet 1     Sig: Take 1 tablet by mouth every morning (before breakfast)    atorvastatin (LIPITOR) 80 MG tablet 90 tablet 1     Sig: Take 1 tablet by mouth daily    metoprolol tartrate (LOPRESSOR) 25 MG tablet 180 tablet 1     Sig: Take 1 tablet by mouth 2 times daily    ezetimibe (ZETIA) 10 MG tablet 90 tablet 1     Sig: Take 1 tablet by mouth daily

## 2023-04-20 RX ORDER — EZETIMIBE 10 MG/1
10 TABLET ORAL DAILY
Qty: 90 TABLET | Refills: 1 | Status: SHIPPED | OUTPATIENT
Start: 2023-04-20

## 2023-04-20 RX ORDER — ATORVASTATIN CALCIUM 80 MG/1
80 TABLET, FILM COATED ORAL DAILY
Qty: 90 TABLET | Refills: 1 | Status: SHIPPED | OUTPATIENT
Start: 2023-04-20

## 2023-04-20 RX ORDER — PANTOPRAZOLE SODIUM 40 MG/1
40 TABLET, DELAYED RELEASE ORAL
Qty: 90 TABLET | Refills: 1 | Status: SHIPPED | OUTPATIENT
Start: 2023-04-20

## 2023-04-25 RX ORDER — TIOTROPIUM BROMIDE 18 UG/1
CAPSULE ORAL; RESPIRATORY (INHALATION)
COMMUNITY
Start: 2021-02-13 | End: 2023-04-28 | Stop reason: SDUPTHER

## 2023-04-25 NOTE — TELEPHONE ENCOUNTER
Patient requesting 90 day supply.      PCP: Mikel Coyle MD    Last appt: [unfilled]  Future Appointments   Date Time Provider Kenrick Valera   4/5/2024 11:05 AM [facility] LAB VISIT [facility] BS FIDENCIO   4/12/2024 11:00 AM Argenis Lo MD [facility] BS AMB       Requested Prescriptions     Pending Prescriptions Disp Refills    tiotropium (SPIRIVA HANDIHALER) 18 MCG inhalation capsule 90 capsule 3     Sig: Inhale 1 capsule into the lungs daily

## 2023-04-28 RX ORDER — TIOTROPIUM BROMIDE 18 UG/1
18 CAPSULE ORAL; RESPIRATORY (INHALATION) DAILY
Qty: 90 CAPSULE | Refills: 3 | Status: SHIPPED | OUTPATIENT
Start: 2023-04-28

## 2023-05-05 ENCOUNTER — PATIENT MESSAGE (OUTPATIENT)
Age: 83
End: 2023-05-05

## 2023-05-05 NOTE — TELEPHONE ENCOUNTER
From: Shonda Dao  To: Dr. Lane Salazart: 5/5/2023 12:54 PM EDT  Subject: Lacey Claudia prescription     Can you send a pre authorization for a year to [address] [place], [place] [address]y [address] E Fax number- [phone]. So we can get this s filled.  Thanks

## 2023-06-01 NOTE — TELEPHONE ENCOUNTER
PCP:  Mer Garvin MD    Last appt: [unfilled]  Future Appointments   Date Time Provider Kenrick Valera   4/5/2024 11:05 AM [facility] LAB VISIT [facility] BS FIDENCIO   4/12/2024 11:00 AM Rylan Echeverria MD [facility] BS AMB       Requested Prescriptions     Pending Prescriptions Disp Refills    albuterol sulfate HFA (PROVENTIL;VENTOLIN;PROAIR) 108 (90 Base) MCG/ACT inhaler 18 g 3     Sig: Inhale 1 puff into the lungs every 6 hours as needed for Wheezing

## 2023-06-04 RX ORDER — ALBUTEROL SULFATE 90 UG/1
1 AEROSOL, METERED RESPIRATORY (INHALATION) EVERY 6 HOURS PRN
Qty: 18 G | Refills: 3 | Status: SHIPPED | OUTPATIENT
Start: 2023-06-04

## 2023-06-20 RX ORDER — ALBUTEROL SULFATE 90 UG/1
1 AEROSOL, METERED RESPIRATORY (INHALATION) EVERY 6 HOURS PRN
Qty: 18 G | Refills: 11 | Status: SHIPPED | OUTPATIENT
Start: 2023-06-20

## 2023-08-12 ENCOUNTER — TELEPHONE (OUTPATIENT)
Age: 83
End: 2023-08-12

## 2023-08-12 DIAGNOSIS — R31.9 URINARY TRACT INFECTION WITH HEMATURIA, SITE UNSPECIFIED: Primary | ICD-10-CM

## 2023-08-12 DIAGNOSIS — N39.0 URINARY TRACT INFECTION WITH HEMATURIA, SITE UNSPECIFIED: Primary | ICD-10-CM

## 2023-08-12 RX ORDER — SULFAMETHOXAZOLE AND TRIMETHOPRIM 400; 80 MG/1; MG/1
1 TABLET ORAL 2 TIMES DAILY
Qty: 20 TABLET | Refills: 0 | Status: SHIPPED | OUTPATIENT
Start: 2023-08-12 | End: 2023-08-22

## 2023-08-12 NOTE — TELEPHONE ENCOUNTER
[facility] that the patient is having mild intermittent hematuria for last 2 3 days. No fever or chills no nausea vomiting. No change in mental status. She mentioned that patient had similar episode few months ago and was treated on the lines of UTI by urology. I suggested urine analysis and urine culture but she showed inability to get the tests done being the weekend. Requesting for antibiotics to be sent to [facility]. .  Prescription sent for Bactrim. Also suggested to keep patient well-hydrated.

## 2023-08-30 ENCOUNTER — TELEPHONE (OUTPATIENT)
Age: 83
End: 2023-08-30

## 2023-08-30 DIAGNOSIS — F41.9 ANXIETY: ICD-10-CM

## 2023-08-30 DIAGNOSIS — R45.1 AGITATION: Primary | ICD-10-CM

## 2023-08-30 RX ORDER — LORAZEPAM 0.5 MG/1
0.5 TABLET ORAL 3 TIMES DAILY PRN
Qty: 90 TABLET | Refills: 0 | Status: SHIPPED | OUTPATIENT
Start: 2023-08-30 | End: 2023-09-29

## 2023-10-19 RX ORDER — ATORVASTATIN CALCIUM 80 MG/1
80 TABLET, FILM COATED ORAL DAILY
Qty: 90 TABLET | Refills: 3 | Status: SHIPPED | OUTPATIENT
Start: 2023-10-19

## 2023-10-19 RX ORDER — PANTOPRAZOLE SODIUM 40 MG/1
40 TABLET, DELAYED RELEASE ORAL
Qty: 90 TABLET | Refills: 3 | Status: SHIPPED | OUTPATIENT
Start: 2023-10-19

## 2023-10-19 RX ORDER — EZETIMIBE 10 MG/1
10 TABLET ORAL DAILY
Qty: 90 TABLET | Refills: 3 | Status: SHIPPED | OUTPATIENT
Start: 2023-10-19

## 2024-01-23 ENCOUNTER — TELEPHONE (OUTPATIENT)
Age: 84
End: 2024-01-23

## 2024-01-23 NOTE — TELEPHONE ENCOUNTER
Charmayne calling from [facility] asking for an order to crush  medication     They also need a order to discontinue tiotropium (SPIRIVA HANDIHALER) 18 MCG inhalation capsule     F. [phone]

## 2024-02-28 ENCOUNTER — TELEPHONE (OUTPATIENT)
Age: 84
End: 2024-02-28

## 2024-02-28 NOTE — TELEPHONE ENCOUNTER
Patient called stating that her  mother is being neglect at her current nursing home so she is going to get her and is requesting a order for a hospital bed.Please Advise

## 2024-03-04 ENCOUNTER — TELEMEDICINE (OUTPATIENT)
Age: 84
End: 2024-03-04
Payer: MEDICARE

## 2024-03-04 DIAGNOSIS — G30.1 LATE ONSET ALZHEIMER'S DEMENTIA WITH BEHAVIORAL DISTURBANCE (HCC): ICD-10-CM

## 2024-03-04 DIAGNOSIS — I10 PRIMARY HYPERTENSION: ICD-10-CM

## 2024-03-04 DIAGNOSIS — S72.002K CLOSED FRACTURE OF LEFT HIP WITH NONUNION, SUBSEQUENT ENCOUNTER: ICD-10-CM

## 2024-03-04 DIAGNOSIS — I25.10 CORONARY ARTERY DISEASE INVOLVING NATIVE CORONARY ARTERY OF NATIVE HEART WITHOUT ANGINA PECTORIS: Primary | ICD-10-CM

## 2024-03-04 DIAGNOSIS — F02.818 LATE ONSET ALZHEIMER'S DEMENTIA WITH BEHAVIORAL DISTURBANCE (HCC): ICD-10-CM

## 2024-03-04 DIAGNOSIS — J44.9 CHRONIC OBSTRUCTIVE PULMONARY DISEASE, UNSPECIFIED COPD TYPE (HCC): ICD-10-CM

## 2024-03-04 DIAGNOSIS — C50.812 MALIGNANT NEOPLASM OF OVERLAPPING SITES OF LEFT FEMALE BREAST, UNSPECIFIED ESTROGEN RECEPTOR STATUS (HCC): ICD-10-CM

## 2024-03-04 PROCEDURE — G8428 CUR MEDS NOT DOCUMENT: HCPCS | Performed by: INTERNAL MEDICINE

## 2024-03-04 PROCEDURE — 1090F PRES/ABSN URINE INCON ASSESS: CPT | Performed by: INTERNAL MEDICINE

## 2024-03-04 PROCEDURE — G8399 PT W/DXA RESULTS DOCUMENT: HCPCS | Performed by: INTERNAL MEDICINE

## 2024-03-04 PROCEDURE — 1123F ACP DISCUSS/DSCN MKR DOCD: CPT | Performed by: INTERNAL MEDICINE

## 2024-03-04 PROCEDURE — 99214 OFFICE O/P EST MOD 30 MIN: CPT | Performed by: INTERNAL MEDICINE

## 2024-03-04 PROCEDURE — G2211 COMPLEX E/M VISIT ADD ON: HCPCS | Performed by: INTERNAL MEDICINE

## 2024-03-04 NOTE — PROGRESS NOTES
91,   hdl 48, ldl-c 98  From 9/20 showed   gluc 89,   cr 0.94, gfr 57,  alt 31,            wbc 7.0, hb 14.4, plt 240, tsh 1.82, ft4 1.10, b12 297, fol>20  From 2/21 showed   gluc 95,   cr 0.90, gfr>60,            chol 108, tg 44,   hdl 61, ldl-c 52,   wb 11.1, hb 12.3, plt 236, trop 68  From 5/22 showed     cr 1.45,              wb 14.5, hb 13.3, plt 148, proBNP 2863, na 129  Form 9/22 showed   gluc 107, cr 0.93, gfr>60,             wb 13.2, hb 11.7, plt 273  From 10/22 showed                       b12 334,        vit d 30.7  From 1/23 showed   gluc 80,   cr 0.90, gfr>60,             wb 10.3, hb 10.5, plt 272  From 4/23 showed   gluc 95,   cr 1.19, gfr 46, alt 32,            wbc 8.5, hb 11.8, plt 283, tsh 1.40, ft4 0.90    No results found for this or any previous visit (from the past 2160 hour(s)).      We reviewed the patient's labs from the last several visits to point out trends in the numbers    Patient Active Problem List   Diagnosis    COPD (chronic obstructive pulmonary disease) (HCC)    Colon polyps    Osteoporosis    CHD (coronary heart disease)    Late onset Alzheimer's dementia with behavioral disturbance (HCC)    Gastroesophageal reflux disease without esophagitis    Advance directive discussed with patient    Arthritis, degenerative    Cancer of overlapping sites of left female breast (HCC)    Primary hypertension    Tobacco dependence syndrome    Monoclonal gammopathy    Dyslipidemia    Hip fracture (HCC)    Bladder outlet obstruction         ASSESSMENT AND PLAN:  1.  Dyslipidemia.continue lipitor and zetia  2.  GERD.  ppi prn and avoidance measures  3.  DJD.  Prn meds  4.  Colon polyps.  Fiber  5.  COPD. Off inhalers at this time  6.  Osteoporosis.  F/U Dr. Chaparro as needed  7.  MGUS.  F/U Dr. Fowler as needed  8.  Allergies.  Flonase  9.  Dementia.  Off meds at this point  10.  HTN.  Controlled on current  11.  Breast ca.  Follow up  Dr Ramos and Dr Fowler prn  12.  CHD.  Continue current regimen

## 2024-03-22 RX ORDER — ALBUTEROL SULFATE 90 UG/1
AEROSOL, METERED RESPIRATORY (INHALATION)
Qty: 17 G | Refills: 3 | Status: SHIPPED | OUTPATIENT
Start: 2024-03-22

## 2024-04-26 RX ORDER — AMLODIPINE BESYLATE 5 MG/1
5 TABLET ORAL DAILY
Qty: 90 TABLET | Refills: 3 | Status: SHIPPED | OUTPATIENT
Start: 2024-04-26

## 2024-04-26 RX ORDER — EZETIMIBE 10 MG/1
10 TABLET ORAL DAILY
Qty: 90 TABLET | Refills: 3 | Status: SHIPPED | OUTPATIENT
Start: 2024-04-26

## 2024-04-26 RX ORDER — PANTOPRAZOLE SODIUM 40 MG/1
40 TABLET, DELAYED RELEASE ORAL
Qty: 90 TABLET | Refills: 3 | Status: SHIPPED | OUTPATIENT
Start: 2024-04-26

## 2024-04-26 RX ORDER — ATORVASTATIN CALCIUM 80 MG/1
80 TABLET, FILM COATED ORAL DAILY
Qty: 90 TABLET | Refills: 3 | Status: SHIPPED | OUTPATIENT
Start: 2024-04-26

## 2024-04-26 RX ORDER — ALBUTEROL SULFATE 90 UG/1
AEROSOL, METERED RESPIRATORY (INHALATION)
Qty: 17 G | Refills: 3 | Status: SHIPPED | OUTPATIENT
Start: 2024-04-26

## 2024-05-02 DIAGNOSIS — R60.9 EDEMA, UNSPECIFIED TYPE: ICD-10-CM

## 2024-05-02 NOTE — TELEPHONE ENCOUNTER
PCP: Rodger Smith MD    LAST REFILL PER CHART:  Medication:furosemide (LASIX) 20 MG tablet   Ordered On:04/06/2023  Instructions: Take 1 tablet by mouth daily   Dispense:30 TABLETS  Refills:1      LAST REFILL PER CHART:  Medication:potassium chloride (KLOR-CON) 10 MEQ extended release tablet   Ordered On:04/06/2023  Instructions:Take 1 tablet by mouth daily   Dispense:30 tablets   Refills:1      No future appointments.

## 2024-05-03 DIAGNOSIS — R60.9 EDEMA, UNSPECIFIED TYPE: ICD-10-CM

## 2024-05-03 RX ORDER — FUROSEMIDE 20 MG/1
20 TABLET ORAL DAILY
Qty: 30 TABLET | Refills: 1 | OUTPATIENT
Start: 2024-05-03

## 2024-05-03 RX ORDER — POTASSIUM CHLORIDE 750 MG/1
10 TABLET, FILM COATED, EXTENDED RELEASE ORAL DAILY
Qty: 30 TABLET | Refills: 1 | OUTPATIENT
Start: 2024-05-03

## 2024-05-03 RX ORDER — FUROSEMIDE 20 MG/1
20 TABLET ORAL DAILY
Qty: 30 TABLET | Refills: 1 | Status: SHIPPED | OUTPATIENT
Start: 2024-05-03

## 2024-05-03 RX ORDER — POTASSIUM CHLORIDE 750 MG/1
10 TABLET, FILM COATED, EXTENDED RELEASE ORAL DAILY
Qty: 30 TABLET | Refills: 1 | Status: SHIPPED | OUTPATIENT
Start: 2024-05-03

## 2024-08-14 DIAGNOSIS — R60.9 EDEMA, UNSPECIFIED TYPE: ICD-10-CM

## 2024-08-15 RX ORDER — FUROSEMIDE 20 MG/1
20 TABLET ORAL DAILY
Qty: 30 TABLET | Refills: 11 | Status: SHIPPED | OUTPATIENT
Start: 2024-08-15

## 2024-08-15 RX ORDER — POTASSIUM CHLORIDE 750 MG/1
10 TABLET, FILM COATED, EXTENDED RELEASE ORAL DAILY
Qty: 30 TABLET | Refills: 11 | Status: SHIPPED | OUTPATIENT
Start: 2024-08-15

## 2025-01-06 RX ORDER — ATORVASTATIN CALCIUM 80 MG/1
80 TABLET, FILM COATED ORAL DAILY
Qty: 90 TABLET | Refills: 3 | Status: SHIPPED | OUTPATIENT
Start: 2025-01-06

## 2025-01-06 RX ORDER — AMLODIPINE BESYLATE 5 MG/1
5 TABLET ORAL DAILY
Qty: 90 TABLET | Refills: 3 | Status: SHIPPED | OUTPATIENT
Start: 2025-01-06

## 2025-01-06 RX ORDER — EZETIMIBE 10 MG/1
10 TABLET ORAL DAILY
Qty: 90 TABLET | Refills: 3 | Status: SHIPPED | OUTPATIENT
Start: 2025-01-06

## 2025-01-06 RX ORDER — METOPROLOL TARTRATE 25 MG/1
25 TABLET, FILM COATED ORAL 2 TIMES DAILY
Qty: 180 TABLET | Refills: 3 | Status: SHIPPED | OUTPATIENT
Start: 2025-01-06

## 2025-01-06 RX ORDER — PANTOPRAZOLE SODIUM 40 MG/1
40 TABLET, DELAYED RELEASE ORAL
Qty: 90 TABLET | Refills: 3 | Status: SHIPPED | OUTPATIENT
Start: 2025-01-06

## 2025-07-23 ENCOUNTER — TELEMEDICINE (OUTPATIENT)
Facility: CLINIC | Age: 85
End: 2025-07-23

## 2025-07-23 DIAGNOSIS — I25.10 CORONARY ARTERY DISEASE INVOLVING NATIVE CORONARY ARTERY OF NATIVE HEART WITHOUT ANGINA PECTORIS: ICD-10-CM

## 2025-07-23 DIAGNOSIS — E78.5 DYSLIPIDEMIA: ICD-10-CM

## 2025-07-23 DIAGNOSIS — G30.1 LATE ONSET ALZHEIMER'S DEMENTIA WITH BEHAVIORAL DISTURBANCE (HCC): ICD-10-CM

## 2025-07-23 DIAGNOSIS — Z00.00 MEDICARE ANNUAL WELLNESS VISIT, SUBSEQUENT: Primary | ICD-10-CM

## 2025-07-23 DIAGNOSIS — C50.812 MALIGNANT NEOPLASM OF OVERLAPPING SITES OF LEFT FEMALE BREAST, UNSPECIFIED ESTROGEN RECEPTOR STATUS (HCC): ICD-10-CM

## 2025-07-23 DIAGNOSIS — F02.818 LATE ONSET ALZHEIMER'S DEMENTIA WITH BEHAVIORAL DISTURBANCE (HCC): ICD-10-CM

## 2025-07-23 DIAGNOSIS — J44.9 CHRONIC OBSTRUCTIVE PULMONARY DISEASE, UNSPECIFIED COPD TYPE (HCC): ICD-10-CM

## 2025-07-23 DIAGNOSIS — I10 PRIMARY HYPERTENSION: ICD-10-CM

## 2025-07-23 DIAGNOSIS — S72.002P CLOSED FRACTURE OF LEFT HIP WITH MALUNION, SUBSEQUENT ENCOUNTER: ICD-10-CM

## 2025-07-23 RX ORDER — METOPROLOL TARTRATE 25 MG/1
25 TABLET, FILM COATED ORAL 2 TIMES DAILY
Qty: 180 TABLET | Refills: 3 | Status: SHIPPED | OUTPATIENT
Start: 2025-07-23

## 2025-07-23 RX ORDER — PANTOPRAZOLE SODIUM 40 MG/1
40 TABLET, DELAYED RELEASE ORAL
Qty: 90 TABLET | Refills: 3 | Status: SHIPPED | OUTPATIENT
Start: 2025-07-23

## 2025-07-23 RX ORDER — ATORVASTATIN CALCIUM 80 MG/1
80 TABLET, FILM COATED ORAL DAILY
Qty: 90 TABLET | Refills: 3 | Status: SHIPPED | OUTPATIENT
Start: 2025-07-23

## 2025-07-23 RX ORDER — AMLODIPINE BESYLATE 5 MG/1
5 TABLET ORAL DAILY
Qty: 90 TABLET | Refills: 3 | Status: SHIPPED | OUTPATIENT
Start: 2025-07-23

## 2025-07-23 RX ORDER — EZETIMIBE 10 MG/1
10 TABLET ORAL DAILY
Qty: 90 TABLET | Refills: 3 | Status: SHIPPED | OUTPATIENT
Start: 2025-07-23

## 2025-07-25 PROBLEM — N32.0 BLADDER OUTLET OBSTRUCTION: Status: RESOLVED | Noted: 2023-01-07 | Resolved: 2025-07-25

## 2025-07-25 NOTE — PROGRESS NOTES
Medicare Annual Wellness Visit    Shwetha June is here for Hypertension, Dementia, COPD, and Medicare AWV    Assessment & Plan   Coronary artery disease involving native coronary artery of native heart without angina pectoris  Late onset Alzheimer's dementia with behavioral disturbance (HCC)  Chronic obstructive pulmonary disease, unspecified COPD type (HCC)  Malignant neoplasm of overlapping sites of left female breast, unspecified estrogen receptor status (HCC)  Primary hypertension  Closed fracture of left hip with malunion, subsequent encounter  Dyslipidemia  Medicare annual wellness visit, subsequent       No follow-ups on file.     Subjective       Patient's complete Health Risk Assessment and screening values have been reviewed and are found in Flowsheets. The following problems were reviewed today and where indicated follow up appointments were made and/or referrals ordered.    No Positive Risk Factors identified today.      Pt is not sexually active                                 Objective    Patient-Reported Vitals  No data recorded               Allergies   Allergen Reactions    Penicillins Hives and Rash     Prior to Visit Medications    Medication Sig Taking? Authorizing Provider   atorvastatin (LIPITOR) 80 MG tablet Take 1 tablet by mouth daily  Rodger Smith MD   metoprolol tartrate (LOPRESSOR) 25 MG tablet Take 1 tablet by mouth 2 times daily  Rodger Smith MD   ezetimibe (ZETIA) 10 MG tablet Take 1 tablet by mouth daily  Rodger Smith MD   pantoprazole (PROTONIX) 40 MG tablet Take 1 tablet by mouth every morning (before breakfast)  Rodger Smith MD   amLODIPine (NORVASC) 5 MG tablet Take 1 tablet by mouth daily  Rodger Smith MD   potassium chloride (KLOR-CON) 10 MEQ extended release tablet TAKE 1 TABLET BY MOUTH ONCE DAILY  Rodger Smith MD   furosemide (LASIX) 20 MG tablet TAKE 1 TABLET BY MOUTH ONCE DAILY  Rodger Smith MD   albuterol sulfate HFA 
DAILY    albuterol sulfate HFA (PROVENTIL;VENTOLIN;PROAIR) 108 (90 Base) MCG/ACT inhaler USE 1 INHALATION EVERY 6 HOURS AS NEEDED FOR WHEEZING     No current facility-administered medications for this visit.       Allergies   Allergen Reactions    Penicillins Hives and Rash           No flowsheet data found.   General: alert, cooperative, no distress   Mental  status: normal mood, behavior, speech, dress, motor activity, and thought processes, able to follow commands   HENT: NCAT   Neck: no visualized mass   Resp: no respiratory distress   Neuro: no gross deficits   Skin: no discoloration or lesions of concern on visible areas   Psychiatric: normal affect, consistent with stated mood, no evidence of hallucinations     Additional exam findings:       We discussed the expected course, resolution and complications of the diagnosis(es) in detail.  Medication risks, benefits, costs, interactions, and alternatives were discussed as indicated.  I advised him to contact the office if his condition worsens, changes or fails to improve as anticipated. He expressed understanding with the diagnosis(es) and plan.     Shwetha June   was evaluated through a synchronous (real-time) audio-video encounter. The patient (or guardian if applicable) is aware that this is a billable service, which includes applicable co-pays. This Virtual Visit was conducted with patient's (and/or legal guardian's) consent. The visit was conducted pursuant to the emergency declaration under the Roman Act and the National Emergencies Act, 1135 waiver authority and the Coronavirus Preparedness and Response Supplemental Appropriations Act.  Patient identification was verified, and a caregiver was present when appropriate.  The patient was located at: Home: [address] [place] [address]   [facility] [address]  The provider was located at: Facility (Appt Dept): [address] [place] [address], [address]  [place] [address]        [id number]    85 y.o. female who presents for

## (undated) DEVICE — (D)PREP SKN CHLRAPRP APPL 26ML -- CONVERT TO ITEM 371833

## (undated) DEVICE — TOWEL SURG W16XL26IN BLU NONFENESTRATED DLX ST 2 PER PK

## (undated) DEVICE — SOLUTION IV 1000ML 0.9% SOD CHL

## (undated) DEVICE — STERILE POLYISOPRENE POWDER-FREE SURGICAL GLOVES: Brand: PROTEXIS

## (undated) DEVICE — SUTURE ABSORBABLE BRAIDED 2-0 CT-1 27 IN UD VICRYL J259H

## (undated) DEVICE — SUTURE MCRYL SZ 4-0 L18IN ABSRB UD L19MM PS-2 3/8 CIR PRIM Y496G

## (undated) DEVICE — SUT SLK 2-0SH 30IN BLK --

## (undated) DEVICE — DEPAUL MAJOR PROCEDURE PACK: Brand: MEDLINE INDUSTRIES, INC.

## (undated) DEVICE — YANKAUER,FLEXIBLE HANDLE,REGLR CAPACITY: Brand: MEDLINE INDUSTRIES, INC.

## (undated) DEVICE — INTENDED FOR TISSUE SEPARATION, AND OTHER PROCEDURES THAT REQUIRE A SHARP SURGICAL BLADE TO PUNCTURE OR CUT.: Brand: BARD-PARKER ® CARBON RIB-BACK BLADES

## (undated) DEVICE — NEEDLE HYPO 30GA L0.5IN BGE POLYPR HUB S STL REG BVL STR

## (undated) DEVICE — ZINACTIVE USE 2641837 CLIP LIG M BLU TI HRT SHP WIRE HORZ 600 PER BX

## (undated) DEVICE — DERMABOND SKIN ADH 0.7ML -- DERMABOND ADVANCED 12/BX

## (undated) DEVICE — SYRINGE MED 3ML NDL 22GA L1.5IN PLAS N CTRL LUERLOCK TIP

## (undated) DEVICE — COVER US PRB W12XL244CM FLD IORT STR TIP

## (undated) DEVICE — SUTURE VCRL SZ 3-0 L27IN ABSRB UD L26MM SH 1/2 CIR J416H

## (undated) DEVICE — KIT PROC PLAS BRST CUST LF --